# Patient Record
Sex: FEMALE | Race: WHITE | NOT HISPANIC OR LATINO | Employment: FULL TIME | URBAN - METROPOLITAN AREA
[De-identification: names, ages, dates, MRNs, and addresses within clinical notes are randomized per-mention and may not be internally consistent; named-entity substitution may affect disease eponyms.]

---

## 2017-04-12 ENCOUNTER — OFFICE VISIT (OUTPATIENT)
Dept: OBSTETRICS AND GYNECOLOGY | Facility: CLINIC | Age: 22
End: 2017-04-12
Payer: COMMERCIAL

## 2017-04-12 ENCOUNTER — LAB VISIT (OUTPATIENT)
Dept: LAB | Facility: HOSPITAL | Age: 22
End: 2017-04-12
Attending: OBSTETRICS & GYNECOLOGY
Payer: COMMERCIAL

## 2017-04-12 VITALS
HEIGHT: 62 IN | WEIGHT: 140.19 LBS | SYSTOLIC BLOOD PRESSURE: 110 MMHG | DIASTOLIC BLOOD PRESSURE: 60 MMHG | BODY MASS INDEX: 25.8 KG/M2 | RESPIRATION RATE: 15 BRPM

## 2017-04-12 DIAGNOSIS — Z32.01 POSITIVE PREGNANCY TEST: Primary | ICD-10-CM

## 2017-04-12 DIAGNOSIS — Z32.01 POSITIVE PREGNANCY TEST: ICD-10-CM

## 2017-04-12 DIAGNOSIS — O26.849 UTERINE SIZE DATE DISCREPANCY, UNSPECIFIED TRIMESTER: ICD-10-CM

## 2017-04-12 LAB
ABO + RH BLD: NORMAL
BASOPHILS # BLD AUTO: 0.01 K/UL
BASOPHILS NFR BLD: 0.1 %
BLD GP AB SCN CELLS X3 SERPL QL: NORMAL
DIFFERENTIAL METHOD: ABNORMAL
EOSINOPHIL # BLD AUTO: 0.1 K/UL
EOSINOPHIL NFR BLD: 0.6 %
ERYTHROCYTE [DISTWIDTH] IN BLOOD BY AUTOMATED COUNT: 13.3 %
HCT VFR BLD AUTO: 36.4 %
HGB BLD-MCNC: 12.7 G/DL
LYMPHOCYTES # BLD AUTO: 1.9 K/UL
LYMPHOCYTES NFR BLD: 21.5 %
MCH RBC QN AUTO: 30 PG
MCHC RBC AUTO-ENTMCNC: 34.9 %
MCV RBC AUTO: 86 FL
MONOCYTES # BLD AUTO: 0.6 K/UL
MONOCYTES NFR BLD: 6.3 %
NEUTROPHILS # BLD AUTO: 6.2 K/UL
NEUTROPHILS NFR BLD: 71.3 %
PLATELET # BLD AUTO: 195 K/UL
PMV BLD AUTO: 11 FL
RBC # BLD AUTO: 4.24 M/UL
WBC # BLD AUTO: 8.73 K/UL

## 2017-04-12 PROCEDURE — 86900 BLOOD TYPING SEROLOGIC ABO: CPT

## 2017-04-12 PROCEDURE — 87086 URINE CULTURE/COLONY COUNT: CPT

## 2017-04-12 PROCEDURE — 99214 OFFICE O/P EST MOD 30 MIN: CPT | Mod: S$GLB,,, | Performed by: OBSTETRICS & GYNECOLOGY

## 2017-04-12 PROCEDURE — 86762 RUBELLA ANTIBODY: CPT

## 2017-04-12 PROCEDURE — 87591 N.GONORRHOEAE DNA AMP PROB: CPT

## 2017-04-12 PROCEDURE — 36415 COLL VENOUS BLD VENIPUNCTURE: CPT

## 2017-04-12 PROCEDURE — 86592 SYPHILIS TEST NON-TREP QUAL: CPT

## 2017-04-12 PROCEDURE — 1160F RVW MEDS BY RX/DR IN RCRD: CPT | Mod: S$GLB,,, | Performed by: OBSTETRICS & GYNECOLOGY

## 2017-04-12 PROCEDURE — 85025 COMPLETE CBC W/AUTO DIFF WBC: CPT

## 2017-04-12 PROCEDURE — 86703 HIV-1/HIV-2 1 RESULT ANTBDY: CPT

## 2017-04-12 PROCEDURE — 88141 CYTOPATH C/V INTERPRET: CPT | Mod: ,,, | Performed by: PATHOLOGY

## 2017-04-12 PROCEDURE — 99999 PR PBB SHADOW E&M-EST. PATIENT-LVL III: CPT | Mod: PBBFAC,,, | Performed by: OBSTETRICS & GYNECOLOGY

## 2017-04-12 PROCEDURE — 87340 HEPATITIS B SURFACE AG IA: CPT

## 2017-04-12 PROCEDURE — 86901 BLOOD TYPING SEROLOGIC RH(D): CPT

## 2017-04-12 PROCEDURE — 88175 CYTOPATH C/V AUTO FLUID REDO: CPT | Performed by: PATHOLOGY

## 2017-04-12 RX ORDER — PROMETHAZINE HYDROCHLORIDE 25 MG/1
25 TABLET ORAL EVERY 4 HOURS PRN
Qty: 30 TABLET | Refills: 3 | Status: SHIPPED | OUTPATIENT
Start: 2017-04-12 | End: 2017-04-20 | Stop reason: SDUPTHER

## 2017-04-12 NOTE — MR AVS SNAPSHOT
[facility]  [address]  [place] [address]  Phone: [phone]  Fax: [phone]                  Gayla Montana   2017 2:45 PM   Office Visit    Description:  Female : 1995   Provider:  Gris Saavedra CNM   Department:  [facility]           Reason for Visit     Possible Pregnancy           Diagnoses this Visit        Comments    Positive pregnancy test    -  Primary     Uterine size date discrepancy, unspecified trimester                To Do List           Future Appointments        Provider Department Dept Phone    2017 3:30 PM LABORATORY, [address] [facility] [phone]    2017 2:00 PM ULTRASOUND, OB-GYN [facility] - OB/ -[phone]    2017 2:45 PM Gris Saavedra CNM [facility] OB/ -[phone]      Goals (5 Years of Data)     None      [facility]     [facility] Nurse Care Line -  Assistance  Unless otherwise directed by your provider, please contact [facility], our nurse care line that is available for  assistance.     Registered nurses in the [facility] provide: appointment scheduling, clinical advisement, health education, and other advisory services.  Call: [phone] (toll free)               Medications           Message regarding Medications     Verify the changes and/or additions to your medication regime listed below are the same as discussed with your clinician today.  If any of these changes or additions are incorrect, please notify your healthcare provider.             Verify that the below list of medications is an accurate representation of the medications you are currently taking.  If none reported, the list may be blank. If incorrect, please contact your healthcare provider. Carry this list with you in case of emergency.           Current Medications     PRENATABS FA 29-1 mg Tab            Clinical Reference Information           Your Vitals  "Were     BP Resp Height Weight Last Period BMI    110/60 (BP Location: Right arm, Patient Position: Sitting, BP Method: Manual) 15 5' 2" (1.575 m) 63.6 kg (140 lb 3.4 oz) 02/22/2017 25.65 kg/m2      Blood Pressure          Most Recent Value    BP  110/60      Allergies as of 4/12/2017     Latex, Natural Rubber    Olive Extract    Tomato (Solanum Lycopersicum)      Immunizations Administered on Date of Encounter - 4/12/2017     None      Orders Placed During Today's Visit      Normal Orders This Visit    Liquid-based pap smear, screening     Urine culture     Future Labs/Procedures Expected by Expires    CBC auto differential  4/12/2017 6/11/2018    Hepatitis B surface antigen  4/12/2017 6/11/2018    HIV-1 and HIV-2 antibodies  4/12/2017 6/11/2018    RPR  4/12/2017 6/11/2018    Rubella antibody, IgG  4/12/2017 6/11/2018    Type & Screen  4/12/2017 6/11/2018    US OB/GYN Procedure ([facility])  As directed 4/12/2018      [facility] Sign-Up     Activating your [facility] account is as easy as 1-2-3!     1) Visit [email or web address], select Sign Up Now, enter this activation code and your date of birth, then select Next.  Activation code not generated  Current Patient Portal Status: Account disabled      2) Create a username and password to use when you visit [facility] in the future and select a security question in case you lose your password and select Next.    3) Enter your e-mail address and click Sign Up!    Additional Information  If you have questions, please e-mail [email or web address] or call [phone] to talk to our [facility] staff. Remember, [facility] is NOT to be used for urgent needs. For medical emergencies, dial 911.         Language Assistance Services     ATTENTION: Language assistance services are available, free of charge. Please call [phone].      ATENCIÓN: Si habla español, tiene a stewart disposición servicios gratuitos de asistencia lingüística. Llame al [phone].     CHÚ Ý: N?u b?n nói Ti?ng " Vi?t, có các d?ch v? h? tr? ngôn ng? mi?n phí dành cho b?n. G?i s? [phone].         [facility] complies with applicable Federal civil rights laws and does not discriminate on the basis of race, color, national origin, age, disability, or sex.

## 2017-04-13 LAB
BACTERIA UR CULT: NORMAL
HBV SURFACE AG SERPL QL IA: NEGATIVE
HIV 1+2 AB+HIV1 P24 AG SERPL QL IA: NEGATIVE
RPR SER QL: NORMAL
RUBV IGG SER-ACNC: 42.3 IU/ML
RUBV IGG SER-IMP: REACTIVE

## 2017-04-13 NOTE — PROGRESS NOTES
"Subjective:      Gayla Montana is a 22 y.o. female who presents for evaluation of amenorrhea. She believes she could be pregnant. Pregnancy is desired. Sexual Activity: single partner, contraception: none. Current symptoms also include: nausea and positive home pregnancy test. Last period was normal. ; previous vaginal delivery here with us, familiar with practice.      Patient's last menstrual period was 2017.  The following portions of the patient's history were reviewed and updated as appropriate: allergies, current medications, past family history, past medical history, past social history, past surgical history and problem list.    Review of Systems  Pertinent items are noted in HPI.       Objective:      /60 (BP Location: Right arm, Patient Position: Sitting, BP Method: Manual)  Resp 15  Ht 5' 2" (1.575 m)  Wt 63.6 kg (140 lb 3.4 oz)  LMP 2017  BMI 25.65 kg/m2  Physical Examination: General appearance - alert, well appearing, and in no distress, oriented to person, place, and time, normal appearing weight and acyanotic, in no respiratory distress  Mental status - alert, oriented to person, place, and time, normal mood, behavior, speech, dress, motor activity, and thought processes  Neck - supple, no significant adenopathy  Chest - no tachypnea, retractions or cyanosis  Pelvic - VULVA: normal appearing vulva with no masses, tenderness or lesions, VAGINA: normal appearing vagina with normal color and discharge, no lesions, CERVIX: normal appearing cervix without discharge or lesions  Neurological - alert, oriented, normal speech, no focal findings or movement disorder noted  Musculoskeletal - full range of motion without pain  Extremities - no pedal edema noted  Skin - normal coloration and turgor, no rashes    Lab Review  Urine HCG: positive      Assessment/Plan:     1. Positive pregnancy test  CBC auto differential    Hepatitis B surface antigen    HIV-1 and HIV-2 " antibodies    RPR    Rubella antibody, IgG    Urine culture    Type & Screen    Liquid-based pap smear, screening    C. trachomatis/N. gonorrhoeae by AMP DNA Cervicovaginal   2. Uterine size date discrepancy, unspecified trimester  US OB/GYN Procedure ([facility])     PNV  Phenergan for nausea  New OB labs today  Appt with dating ultrasound in 1 week

## 2017-04-14 LAB
C TRACH DNA SPEC QL NAA+PROBE: NOT DETECTED
N GONORRHOEA DNA SPEC QL NAA+PROBE: NOT DETECTED

## 2017-04-19 ENCOUNTER — TELEPHONE (OUTPATIENT)
Dept: OBSTETRICS AND GYNECOLOGY | Facility: CLINIC | Age: 22
End: 2017-04-19

## 2017-04-19 DIAGNOSIS — R87.613 HSIL (HIGH GRADE SQUAMOUS INTRAEPITHELIAL LESION) ON PAP SMEAR OF CERVIX: Primary | ICD-10-CM

## 2017-04-19 NOTE — TELEPHONE ENCOUNTER
Attempted to contact patient, no answer. Voicemail has not been set up yet. Will attempt to call at a different time.

## 2017-04-19 NOTE — TELEPHONE ENCOUNTER
Please notify patient her pap came back with abnormal cells, high grade, colposcopy is recommended. Please schedule with MD first available.

## 2017-04-20 ENCOUNTER — INITIAL PRENATAL (OUTPATIENT)
Dept: OBSTETRICS AND GYNECOLOGY | Facility: CLINIC | Age: 22
End: 2017-04-20
Payer: COMMERCIAL

## 2017-04-20 ENCOUNTER — PROCEDURE VISIT (OUTPATIENT)
Dept: OBSTETRICS AND GYNECOLOGY | Facility: CLINIC | Age: 22
End: 2017-04-20
Payer: COMMERCIAL

## 2017-04-20 VITALS — BODY MASS INDEX: 25.24 KG/M2 | SYSTOLIC BLOOD PRESSURE: 112 MMHG | WEIGHT: 138 LBS | DIASTOLIC BLOOD PRESSURE: 60 MMHG

## 2017-04-20 DIAGNOSIS — Z34.80 SUPERVISION OF OTHER NORMAL PREGNANCY: Primary | ICD-10-CM

## 2017-04-20 DIAGNOSIS — O26.849 UTERINE SIZE DATE DISCREPANCY, UNSPECIFIED TRIMESTER: ICD-10-CM

## 2017-04-20 PROCEDURE — 99999 PR PBB SHADOW E&M-EST. PATIENT-LVL II: CPT | Mod: PBBFAC,,, | Performed by: OBSTETRICS & GYNECOLOGY

## 2017-04-20 PROCEDURE — 76801 OB US < 14 WKS SINGLE FETUS: CPT | Mod: S$GLB,,, | Performed by: OBSTETRICS & GYNECOLOGY

## 2017-04-20 PROCEDURE — 0500F INITIAL PRENATAL CARE VISIT: CPT | Mod: S$GLB,,, | Performed by: OBSTETRICS & GYNECOLOGY

## 2017-04-20 RX ORDER — PROMETHAZINE HYDROCHLORIDE 25 MG/1
25 TABLET ORAL EVERY 4 HOURS PRN
Qty: 30 TABLET | Refills: 3 | Status: SHIPPED | OUTPATIENT
Start: 2017-04-20 | End: 2017-05-18

## 2017-04-20 NOTE — MR AVS SNAPSHOT
[facility]  [address]  [place] [address]  Phone: [phone]  Fax: [phone]                  Gayla Montana   2017 2:45 PM   Initial Prenatal    Description:  Female : 1995   Provider:  Gris Saavedra CNM   Department:  [facility]           Reason for Visit     Initial Prenatal Visit                To Do List           Future Appointments        Provider Department Dept Phone    2017 10:00 AM MD [facility] - OB/ -[phone]    2017 2:30 PM LASHONDA [facility] - OB/ -[phone]      Goals (5 Years of Data)     None       These Medications        Disp Refills Start End    promethazine (PHENERGAN) 25 MG tablet 30 tablet 3 2017     Take 1 tablet (25 mg total) by mouth every 4 (four) hours as needed for Nausea. - Oral    Pharmacy: [facility] [address] [facility] #: [phone]         [facility] On Call     [facility] On Call Nurse Care Line -  Assistance  Unless otherwise directed by your provider, please contact [facility], our nurse care line that is available for  assistance.     Registered nurses in the [facility] provide: appointment scheduling, clinical advisement, health education, and other advisory services.  Call: [phone] (toll free)               Medications           Message regarding Medications     Verify the changes and/or additions to your medication regime listed below are the same as discussed with your clinician today.  If any of these changes or additions are incorrect, please notify your healthcare provider.             Verify that the below list of medications is an accurate representation of the medications you are currently taking.  If none reported, the list may be blank. If incorrect, please contact your healthcare provider. Carry this list with you in case of emergency.           Current Medications     PRENATABS FA  29-1 mg Tab     promethazine (PHENERGAN) 25 MG tablet Take 1 tablet (25 mg total) by mouth every 4 (four) hours as needed for Nausea.           Clinical Reference Information           Prenatal Vitals     Enc. Date GA Prenatal Vitals Prenatal Pulse Pain Level Urine Albumin/Glucose Edema Presentation Dilation/Effacement/Station    4/20/17 8w1d 112/60 / 62.6 kg (138 lb 0.1 oz)  / us 148 / Absent  0  None / None / None / No         TWG: -0.904 kg (-1 lb 15.9 oz)   Pregravid weight: 63.5 kg (140 lb)   Number of fetuses: 1       Your Vitals Were     BP Weight Last Period BMI       112/60 62.6 kg (138 lb 0.1 oz) 02/22/2017 25.24 kg/m2       Allergies as of 4/20/2017     Latex, Natural Rubber    Olive Extract    Tomato (Solanum Lycopersicum)      Immunizations Administered on Date of Encounter - 4/20/2017     None      [facility] Sign-Up     Activating your [facility] account is as easy as 1-2-3!     1) Visit [email or web address], select Sign Up Now, enter this activation code and your date of birth, then select Next.  Activation code not generated  Current Patient Portal Status: Account disabled      2) Create a username and password to use when you visit [facility] in the future and select a security question in case you lose your password and select Next.    3) Enter your e-mail address and click Sign Up!    Additional Information  If you have questions, please e-mail [email or web address] or call [phone] to talk to our [facility] staff. Remember, [facility] is NOT to be used for urgent needs. For medical emergencies, dial 911.         Language Assistance Services     ATTENTION: Language assistance services are available, free of charge. Please call [phone].      ATENCIÓN: Si austinla cecillecarlie, tiene a stewart disposición servicios gratuitos de asistencia lingüística. Llame al [phone].     CHÚ Ý: N?u b?n nói Ti?ng Vi?t, có các d?ch v? h? tr? ngôn ng? mi?n phí dành cho b?n. G?i s? [phone].         [facility]  complies with applicable Federal civil rights laws and does not discriminate on the basis of race, color, national origin, age, disability, or sex.

## 2017-04-21 NOTE — PROGRESS NOTES
New OB welcomed and reoriented to CNM/MD collaborative practice. Had last baby with us. Labs reviewed, WNL. Pap high grade, colpo scheduled. Nausea better with phenergan. Vaginal delivery consent signed. Taking PNV. Ultrasound done and congruent with LMP. Planning breastfeeding, aware of baby friendly and encouraged to download coffective. A-Z booklet and new OB paperwork given. No complaints today, f/u 4 weeks.

## 2017-04-28 ENCOUNTER — TELEPHONE (OUTPATIENT)
Dept: OBSTETRICS AND GYNECOLOGY | Facility: CLINIC | Age: 22
End: 2017-04-28

## 2017-04-28 NOTE — TELEPHONE ENCOUNTER
----- Message from Jean Beckman sent at 4/28/2017 10:26 AM CDT -----  Contact: Pt   Pt is requesting to reschedule her colpo. Pt states she needs to be seen sooner./ Pt can be reached at [phone] (home)

## 2017-04-28 NOTE — TELEPHONE ENCOUNTER
Patient rescheduled with Dr. Forrest on 5/5/2017 at [facility] location per patient request. Patient voiced understanding.

## 2017-05-05 ENCOUNTER — PROCEDURE VISIT (OUTPATIENT)
Dept: OBSTETRICS AND GYNECOLOGY | Facility: CLINIC | Age: 22
End: 2017-05-05
Payer: COMMERCIAL

## 2017-05-05 ENCOUNTER — TELEPHONE (OUTPATIENT)
Dept: OBSTETRICS AND GYNECOLOGY | Facility: CLINIC | Age: 22
End: 2017-05-05

## 2017-05-05 VITALS
SYSTOLIC BLOOD PRESSURE: 129 MMHG | BODY MASS INDEX: 24.11 KG/M2 | WEIGHT: 131.81 LBS | DIASTOLIC BLOOD PRESSURE: 85 MMHG

## 2017-05-05 DIAGNOSIS — R87.613 HSIL (HIGH GRADE SQUAMOUS INTRAEPITHELIAL LESION) ON PAP SMEAR OF CERVIX: Primary | ICD-10-CM

## 2017-05-05 PROCEDURE — 57455 BIOPSY OF CERVIX W/SCOPE: CPT | Mod: S$GLB,,, | Performed by: OBSTETRICS & GYNECOLOGY

## 2017-05-05 PROCEDURE — 88305 TISSUE EXAM BY PATHOLOGIST: CPT | Mod: 26,,, | Performed by: PATHOLOGY

## 2017-05-05 PROCEDURE — 88305 TISSUE EXAM BY PATHOLOGIST: CPT | Performed by: PATHOLOGY

## 2017-05-05 NOTE — TELEPHONE ENCOUNTER
----- Message from Florentin Parsons sent at 5/5/2017  8:08 AM CDT -----  Contact: Pt   States she is running about 15 min late due to car probs and is coming in for a procedure and can be reached at [phone]//thanks/dbw

## 2017-05-05 NOTE — PROCEDURES
Colposcopy-Today  Date/Time: 2017 9:46 AM  Performed by: SHYLA ALEXIS  Authorized by: SHYLA ALEXIS     Consent Done?:  Yes (Written)  Assistants?: No      Colposcopy Site:  Cervix  Position:  Supine  Acrowhite Lesion? Yes    Atypical Vessels: No    Transformation Zone Adequate?: Yes    Biopsy?: Yes         Location:  Cervix ((6 00))  ECC Performed?: No    LEEP Performed?: No    Estimated blood loss (cc):  1   Patient tolerated the procedure well with no immediate complications.   Post-operative instructions were provided for the patient.   Patient was discharged and will follow up if any complications occur     COLPOSCOPY:    Gayla Montana is a 22 y.o. female   presents for colposcopy.  Patient's last menstrual period was 2017..  Her most recent pap smear shows high-grade squamous intraepithelial neoplasia  (HGSIL-encompassing moderate and severe dysplasia).      The abnormal test findings were discussed, as well as HPV infection, need for colposcopy and possible biopsies to determine the plan of care, treatments available, the minimal risk of bleeding and infection with colposcopy, and alternatives to colposcopy.  Pt voiced understanding and desires to proceed.    Pt is pregnant 10w2d.    COLPOSCOPY EXAM:   TIME OUT PERFORMED.     No gross vulvovaginal or cervix lesions noted.  On colpo: no abnormal vasculature, acetowhite lesion(s) noted at 11 o'clock and mosaicism noted at 6 o'clock    Biopsy was taken at 6 o'clock.  ECC was not performed due to pregnancy.   SCJ was entirely visualized.    Hemostasis was adequate with application of Monsel's solution.  The speculum was removed.  The patient did tolerate the procedure well.    All collected specimens sent to pathology for histologic analysis.    Post-colposcopy counseling:  The patient was instructed to manage post-colposcopy cramping with NSAIDs or Tylenol, or with a prescription per the medication card.  Avoid intercourse,  douching, or tampons in the vagina for at least 2-3 days.  Expect a clumpy blackish discharge due to Monsel's solution application for several days.  Report heavy bleeding, worsening pain or pain that does not respond to above medications, or foul-smelling vaginal discharge. HPV vaccine recommended according to FDA age guidelines.  Importance of follow-up stressed.      Follow up based on colposcopy results.  Impression:  MITZI II-III.  If confirmed by pathology results, recommend repeat colposcopy 6 weeks PP.

## 2017-05-18 ENCOUNTER — ROUTINE PRENATAL (OUTPATIENT)
Dept: OBSTETRICS AND GYNECOLOGY | Facility: CLINIC | Age: 22
End: 2017-05-18
Payer: COMMERCIAL

## 2017-05-18 VITALS
WEIGHT: 134.69 LBS | SYSTOLIC BLOOD PRESSURE: 121 MMHG | DIASTOLIC BLOOD PRESSURE: 68 MMHG | BODY MASS INDEX: 24.64 KG/M2

## 2017-05-18 DIAGNOSIS — N87.1 CIN II (CERVICAL INTRAEPITHELIAL NEOPLASIA II): ICD-10-CM

## 2017-05-18 DIAGNOSIS — Z34.80 SUPERVISION OF OTHER NORMAL PREGNANCY: Primary | ICD-10-CM

## 2017-05-18 PROCEDURE — 99999 PR PBB SHADOW E&M-EST. PATIENT-LVL II: CPT | Mod: PBBFAC,,, | Performed by: OBSTETRICS & GYNECOLOGY

## 2017-05-18 PROCEDURE — 0502F SUBSEQUENT PRENATAL CARE: CPT | Mod: S$GLB,,, | Performed by: OBSTETRICS & GYNECOLOGY

## 2017-05-18 RX ORDER — PROMETHAZINE HYDROCHLORIDE 25 MG/1
25 SUPPOSITORY RECTAL EVERY 6 HOURS PRN
Qty: 12 SUPPOSITORY | Refills: 3 | Status: ON HOLD | OUTPATIENT
Start: 2017-05-18 | End: 2017-11-13 | Stop reason: HOSPADM

## 2017-05-18 RX ORDER — PROMETHAZINE HYDROCHLORIDE 25 MG/1
25 SUPPOSITORY RECTAL EVERY 6 HOURS PRN
Qty: 12 SUPPOSITORY | Refills: 1 | Status: SHIPPED | OUTPATIENT
Start: 2017-05-18 | End: 2017-05-18 | Stop reason: SDUPTHER

## 2017-05-18 NOTE — MR AVS SNAPSHOT
[facility]  [address]  [place] [address]  Phone: [phone]  Fax: [phone]                  Gayla Montana   2017 2:30 PM   Routine Prenatal    Description:  Female : 1995   Provider:  Gris Saavedra CNM   Department:  [facility]           Reason for Visit     Routine Prenatal Visit           Diagnoses this Visit        Comments    Supervision of other normal pregnancy    -  Primary     MITZI II (cervical intraepithelial neoplasia II)                To Do List           Future Appointments        Provider Department Dept Phone    2017 1:30 PM LABORATORY, [address] [facility] [phone]    2017 1:45 PM Gris Saavedra CNM [facility] OB/ -[phone]      Goals (5 Years of Data)     None      Follow-Up and Disposition     Return in about 4 weeks (around 6/15/2017).    Follow-up and Disposition History       These Medications        Disp Refills Start End    promethazine (PHENERGAN) 25 MG suppository 12 suppository 3 2017    Place 1 suppository (25 mg total) rectally every 6 (six) hours as needed for Nausea. - Rectal    Pharmacy: [facility] [address] #: [phone]         [facility] On Call     [facility] On Call Nurse Care Line - 24/7 Assistance  Unless otherwise directed by your provider, please contact [facility], our nurse care line that is available for 24/7 assistance.     Registered nurses in the [facility] provide: appointment scheduling, clinical advisement, health education, and other advisory services.  Call: [phone] (toll free)               Medications           Message regarding Medications     Verify the changes and/or additions to your medication regime listed below are the same as discussed with your clinician today.  If any of these changes or additions are incorrect, please notify your healthcare provider.         START taking these NEW medications        Refills    promethazine (PHENERGAN) 25 MG suppository 3    Sig: Place 1 suppository (25 mg total) rectally every 6 (six) hours as needed for Nausea.    Class: Normal    Route: Rectal      STOP taking these medications     promethazine (PHENERGAN) 25 MG tablet Take 1 tablet (25 mg total) by mouth every 4 (four) hours as needed for Nausea.           Verify that the below list of medications is an accurate representation of the medications you are currently taking.  If none reported, the list may be blank. If incorrect, please contact your healthcare provider. Carry this list with you in case of emergency.           Current Medications     PRENATABS FA 29-1 mg Tab     promethazine (PHENERGAN) 25 MG suppository Place 1 suppository (25 mg total) rectally every 6 (six) hours as needed for Nausea.           Clinical Reference Information           Prenatal Vitals     Enc. Date GA Prenatal Vitals Prenatal Pulse Pain Level Urine Albumin/Glucose Edema Presentation Dilation/Effacement/Station    5/18/17 12w1d 121/68 / 61.1 kg (134 lb 11.2 oz)  / 166 / Absent  0  None / None / None / No      5/5/17 10w2d 129/85 / 59.8 kg (131 lb 13.4 oz)           4/20/17 8w1d 112/60 / 62.6 kg (138 lb 0.1 oz)  / us 148 / Absent  0  None / None / None / No         TWG: -2.403 kg (-5 lb 4.8 oz)   Pregravid weight: 63.5 kg (140 lb)   Number of fetuses: 1       Your Vitals Were     BP Weight Last Period BMI       121/68 61.1 kg (134 lb 11.2 oz) 02/22/2017 24.64 kg/m2       Allergies as of 5/18/2017     Latex, Natural Rubber    Olive Extract    Tomato (Solanum Lycopersicum)      Immunizations Administered on Date of Encounter - 5/18/2017     None      Orders Placed During Today's Visit     Future Labs/Procedures Expected by Expires    Maternal Quad Screen  5/18/2017 7/17/2018      [facility] Sign-Up     Activating your [facility] account is as easy as 1-2-3!     1) Visit [email or web address], select Sign Up Now,  enter this activation code and your date of birth, then select Next.  Activation code not generated  Current Patient Portal Status: Account disabled      2) Create a username and password to use when you visit [facility] in the future and select a security question in case you lose your password and select Next.    3) Enter your e-mail address and click Sign Up!    Additional Information  If you have questions, please e-mail [email or web address] or call [phone] to talk to our [employer] staff. Remember, [facility] is NOT to be used for urgent needs. For medical emergencies, dial 911.         Language Assistance Services     ATTENTION: Language assistance services are available, free of charge. Please call [phone].      ATENCIÓN: Si viviane chang, tiene a stewart disposición servicios gratuitos de asistencia lingüística. Llame al [phone].     CHÚ Ý: N?u b?n nói Ti?ng Vi?t, có các d?ch v? h? tr? ngôn ng? mi?n phí dành cho b?n. G?i s? [phone].         [facility] complies with applicable Federal civil rights laws and does not discriminate on the basis of race, color, national origin, age, disability, or sex.

## 2017-05-18 NOTE — PROGRESS NOTES
Desires QUAD screen next visit  Still with nausea, phenergan [place] helps; patient with dry lips and appears very dehydrated today - states ran out of phenergan; instructed to take phenergan then push PO hydration, if unable to keep down go to ER for IV hydration  Colpo MITZI II - discussed findings with patient and current recommendations, repeat colpo PP

## 2017-05-25 ENCOUNTER — TELEPHONE (OUTPATIENT)
Dept: OBSTETRICS AND GYNECOLOGY | Facility: CLINIC | Age: 22
End: 2017-05-25

## 2017-05-25 NOTE — TELEPHONE ENCOUNTER
Called pt but no answer.  Unable to leave message due to no voicemail.  Reason for call: review pathology results (MITZI II).  These results appear to have also been reported to pt by CNM at her last visit.  Recommend repeat colpo PP.

## 2017-06-14 ENCOUNTER — LAB VISIT (OUTPATIENT)
Dept: LAB | Facility: HOSPITAL | Age: 22
End: 2017-06-14
Attending: OBSTETRICS & GYNECOLOGY
Payer: COMMERCIAL

## 2017-06-14 ENCOUNTER — ROUTINE PRENATAL (OUTPATIENT)
Dept: OBSTETRICS AND GYNECOLOGY | Facility: CLINIC | Age: 22
End: 2017-06-14
Payer: COMMERCIAL

## 2017-06-14 VITALS
BODY MASS INDEX: 24.44 KG/M2 | SYSTOLIC BLOOD PRESSURE: 116 MMHG | DIASTOLIC BLOOD PRESSURE: 74 MMHG | WEIGHT: 133.63 LBS

## 2017-06-14 DIAGNOSIS — Z36.89 ENCOUNTER FOR FETAL ANATOMIC SURVEY: ICD-10-CM

## 2017-06-14 DIAGNOSIS — Z34.80 SUPERVISION OF OTHER NORMAL PREGNANCY: Primary | ICD-10-CM

## 2017-06-14 DIAGNOSIS — Z34.80 SUPERVISION OF OTHER NORMAL PREGNANCY: ICD-10-CM

## 2017-06-14 PROCEDURE — 99999 PR PBB SHADOW E&M-EST. PATIENT-LVL III: CPT | Mod: PBBFAC,,, | Performed by: ADVANCED PRACTICE MIDWIFE

## 2017-06-14 PROCEDURE — 0502F SUBSEQUENT PRENATAL CARE: CPT | Mod: S$GLB,,, | Performed by: ADVANCED PRACTICE MIDWIFE

## 2017-06-14 PROCEDURE — 81511 FTL CGEN ABNOR FOUR ANAL: CPT

## 2017-06-14 PROCEDURE — 36415 COLL VENOUS BLD VENIPUNCTURE: CPT

## 2017-06-14 NOTE — PROGRESS NOTES
Pt is doing better, eating well, 1 # loss. Spicy foods not tolerated, but pt aware of those foods. Had US at [email or web address], baby boy. Anatomy scan next OV. QMS today. + headaches but relieved with tylenol, exp if not relieved can try benadryl. al

## 2017-06-19 LAB
# FETUSES US: NORMAL
2ND TRIMESTER 4 SCREEN PNL SERPL: NEGATIVE
2ND TRIMESTER 4 SCREEN SERPL-IMP: NORMAL
AFP MOM SERPL: 0.96
AFP SERPL-MCNC: 33.5 NG/ML
AGE AT DELIVERY: 21
B-HCG MOM SERPL: 1.71
B-HCG SERPL-ACNC: 66.8 IU/ML
FET TS 21 RISK FROM MAT AGE: NORMAL
GA (DAYS): 0 D
GA (WEEKS): 16 WK
GA METHOD: NORMAL
IDDM PATIENT QL: NORMAL
INHIBIN A MOM SERPL: 1.61
INHIBIN A SERPL-MCNC: 287.2 PG/ML
TS 18 RISK FETUS: NORMAL
TS 21 RISK FETUS: NORMAL
U ESTRIOL MOM SERPL: 0.9
U ESTRIOL SERPL-MCNC: 0.72 NG/ML

## 2017-07-12 ENCOUNTER — PROCEDURE VISIT (OUTPATIENT)
Dept: OBSTETRICS AND GYNECOLOGY | Facility: CLINIC | Age: 22
End: 2017-07-12
Payer: COMMERCIAL

## 2017-07-12 DIAGNOSIS — Z36.89 ENCOUNTER FOR FETAL ANATOMIC SURVEY: ICD-10-CM

## 2017-07-12 PROCEDURE — 76805 OB US >/= 14 WKS SNGL FETUS: CPT | Mod: S$GLB,,, | Performed by: OBSTETRICS & GYNECOLOGY

## 2017-07-21 ENCOUNTER — ROUTINE PRENATAL (OUTPATIENT)
Dept: OBSTETRICS AND GYNECOLOGY | Facility: CLINIC | Age: 22
End: 2017-07-21
Payer: COMMERCIAL

## 2017-07-21 VITALS
DIASTOLIC BLOOD PRESSURE: 60 MMHG | WEIGHT: 141.13 LBS | SYSTOLIC BLOOD PRESSURE: 118 MMHG | BODY MASS INDEX: 25.81 KG/M2

## 2017-07-21 DIAGNOSIS — Z34.92 ENCOUNTER FOR SUPERVISION OF NORMAL PREGNANCY IN SECOND TRIMESTER, UNSPECIFIED GRAVIDITY: Primary | ICD-10-CM

## 2017-07-21 PROCEDURE — 99999 PR PBB SHADOW E&M-EST. PATIENT-LVL III: CPT | Mod: PBBFAC,,, | Performed by: ADVANCED PRACTICE MIDWIFE

## 2017-07-21 PROCEDURE — 0502F SUBSEQUENT PRENATAL CARE: CPT | Mod: S$GLB,,, | Performed by: ADVANCED PRACTICE MIDWIFE

## 2017-07-21 NOTE — PROGRESS NOTES
Doing well, no complaints  Anatomy u/s suboptimal, repeat next visit  Questions answered  Plans breastfeeding    Coffective counseling sheet Get Ready discussed with mother. Reinforced avoiding induction of labor unless medically indicated as well as comfort measures during labor.  Encouraged mother to download Coffective mobile jair if she has not already done so. Mother verbalizes understanding.

## 2017-08-14 ENCOUNTER — ROUTINE PRENATAL (OUTPATIENT)
Dept: OBSTETRICS AND GYNECOLOGY | Facility: CLINIC | Age: 22
End: 2017-08-14
Payer: COMMERCIAL

## 2017-08-14 ENCOUNTER — PROCEDURE VISIT (OUTPATIENT)
Dept: OBSTETRICS AND GYNECOLOGY | Facility: CLINIC | Age: 22
End: 2017-08-14
Payer: COMMERCIAL

## 2017-08-14 VITALS
BODY MASS INDEX: 26.33 KG/M2 | WEIGHT: 143.94 LBS | SYSTOLIC BLOOD PRESSURE: 118 MMHG | DIASTOLIC BLOOD PRESSURE: 62 MMHG

## 2017-08-14 DIAGNOSIS — Z34.80 SUPERVISION OF OTHER NORMAL PREGNANCY: Primary | ICD-10-CM

## 2017-08-14 PROCEDURE — 76816 OB US FOLLOW-UP PER FETUS: CPT | Mod: S$GLB,,, | Performed by: OBSTETRICS & GYNECOLOGY

## 2017-08-14 PROCEDURE — 99999 PR PBB SHADOW E&M-EST. PATIENT-LVL II: CPT | Mod: PBBFAC,,, | Performed by: ADVANCED PRACTICE MIDWIFE

## 2017-08-14 PROCEDURE — 0502F SUBSEQUENT PRENATAL CARE: CPT | Mod: S$GLB,,, | Performed by: ADVANCED PRACTICE MIDWIFE

## 2017-08-14 RX ORDER — PROMETHAZINE HYDROCHLORIDE 12.5 MG/1
12.5 SUPPOSITORY RECTAL
COMMUNITY
End: 2017-10-03 | Stop reason: DRUGHIGH

## 2017-08-14 NOTE — PROGRESS NOTES
Doing well   PTL precautions  Anatomy Scan today complete    Coffective counseling sheet Fall In Love discussed with mother. Reinforced immediate skin to skin, the magic first hour, importance of the first feeding and delaying routine procedures. Encouraged mother to download Coffective mobile jair if she has not already done so. Mother verbalizes understanding.

## 2017-09-19 ENCOUNTER — ROUTINE PRENATAL (OUTPATIENT)
Dept: OBSTETRICS AND GYNECOLOGY | Facility: CLINIC | Age: 22
End: 2017-09-19
Payer: COMMERCIAL

## 2017-09-19 ENCOUNTER — LAB VISIT (OUTPATIENT)
Dept: LAB | Facility: HOSPITAL | Age: 22
End: 2017-09-19
Attending: ADVANCED PRACTICE MIDWIFE
Payer: MEDICAID

## 2017-09-19 VITALS
DIASTOLIC BLOOD PRESSURE: 76 MMHG | BODY MASS INDEX: 28.55 KG/M2 | SYSTOLIC BLOOD PRESSURE: 128 MMHG | WEIGHT: 156.06 LBS

## 2017-09-19 DIAGNOSIS — Z34.80 SUPERVISION OF OTHER NORMAL PREGNANCY: Primary | ICD-10-CM

## 2017-09-19 DIAGNOSIS — Z34.80 SUPERVISION OF OTHER NORMAL PREGNANCY: ICD-10-CM

## 2017-09-19 LAB
BASOPHILS # BLD AUTO: 0.01 K/UL
BASOPHILS NFR BLD: 0.1 %
DIFFERENTIAL METHOD: ABNORMAL
EOSINOPHIL # BLD AUTO: 0.1 K/UL
EOSINOPHIL NFR BLD: 0.8 %
ERYTHROCYTE [DISTWIDTH] IN BLOOD BY AUTOMATED COUNT: 12.6 %
GLUCOSE SERPL-MCNC: 123 MG/DL
HCT VFR BLD AUTO: 28.6 %
HGB BLD-MCNC: 10.2 G/DL
LYMPHOCYTES # BLD AUTO: 1.9 K/UL
LYMPHOCYTES NFR BLD: 20.6 %
MCH RBC QN AUTO: 30.9 PG
MCHC RBC AUTO-ENTMCNC: 35.7 G/DL
MCV RBC AUTO: 87 FL
MONOCYTES # BLD AUTO: 0.4 K/UL
MONOCYTES NFR BLD: 3.9 %
NEUTROPHILS # BLD AUTO: 6.7 K/UL
NEUTROPHILS NFR BLD: 74.3 %
PLATELET # BLD AUTO: 172 K/UL
PMV BLD AUTO: 11.5 FL
RBC # BLD AUTO: 3.3 M/UL
WBC # BLD AUTO: 9 K/UL

## 2017-09-19 PROCEDURE — 99999 PR PBB SHADOW E&M-EST. PATIENT-LVL III: CPT | Mod: PBBFAC,,, | Performed by: ADVANCED PRACTICE MIDWIFE

## 2017-09-19 PROCEDURE — 0502F SUBSEQUENT PRENATAL CARE: CPT | Mod: S$GLB,,, | Performed by: ADVANCED PRACTICE MIDWIFE

## 2017-09-19 PROCEDURE — 86592 SYPHILIS TEST NON-TREP QUAL: CPT

## 2017-09-19 PROCEDURE — 85025 COMPLETE CBC W/AUTO DIFF WBC: CPT

## 2017-09-19 PROCEDURE — 86703 HIV-1/HIV-2 1 RESULT ANTBDY: CPT

## 2017-09-19 PROCEDURE — 36415 COLL VENOUS BLD VENIPUNCTURE: CPT

## 2017-09-19 PROCEDURE — 82950 GLUCOSE TEST: CPT

## 2017-09-20 LAB — HIV 1+2 AB+HIV1 P24 AG SERPL QL IA: NEGATIVE

## 2017-09-21 ENCOUNTER — TELEPHONE (OUTPATIENT)
Dept: OBSTETRICS AND GYNECOLOGY | Facility: CLINIC | Age: 22
End: 2017-09-21

## 2017-09-21 ENCOUNTER — TELEPHONE (OUTPATIENT)
Dept: OBSTETRICS AND GYNECOLOGY | Facility: HOSPITAL | Age: 22
End: 2017-09-21

## 2017-09-21 DIAGNOSIS — O99.013 ANEMIA OF MOTHER IN PREGNANCY, ANTEPARTUM, THIRD TRIMESTER: ICD-10-CM

## 2017-09-21 PROBLEM — O99.019 ANEMIA OF MOTHER IN PREGNANCY, ANTEPARTUM: Status: ACTIVE | Noted: 2017-09-21

## 2017-09-21 LAB — RPR SER QL: NORMAL

## 2017-09-21 RX ORDER — FERROUS SULFATE 325(65) MG
325 TABLET ORAL DAILY
Qty: 30 TABLET | Refills: 3 | Status: SHIPPED | OUTPATIENT
Start: 2017-09-21 | End: 2017-12-18

## 2017-09-21 NOTE — TELEPHONE ENCOUNTER
Informed pt on passing her glucose but her iron was low pt stated she had some iron pills that she will take . Pt verbalized understanding .  Lai Beckamn

## 2017-09-21 NOTE — TELEPHONE ENCOUNTER
Please call pt and inform her that she passed her glucose test but she is slightly anemic and will need to take iron supplements. She can get OTC or I can send some in. They will be once a day. Thank you.

## 2017-09-25 NOTE — PROGRESS NOTES
Labs today, no c/o. Declines Tdap. Denies ptl s/s. Coffective counseling sheet Learn Your Baby discussed with mother. Instructed regarding feeding cues and methods to calm baby. Encouraged mother to download Coffective mobile jair if she has not already done so.  Mother verbalized understanding. al

## 2017-10-03 ENCOUNTER — ROUTINE PRENATAL (OUTPATIENT)
Dept: OBSTETRICS AND GYNECOLOGY | Facility: CLINIC | Age: 22
End: 2017-10-03
Payer: COMMERCIAL

## 2017-10-03 VITALS
DIASTOLIC BLOOD PRESSURE: 54 MMHG | BODY MASS INDEX: 29.19 KG/M2 | SYSTOLIC BLOOD PRESSURE: 114 MMHG | WEIGHT: 159.63 LBS

## 2017-10-03 DIAGNOSIS — Z34.03 ENCOUNTER FOR SUPERVISION OF NORMAL FIRST PREGNANCY IN THIRD TRIMESTER: Primary | ICD-10-CM

## 2017-10-03 PROCEDURE — 99999 PR PBB SHADOW E&M-EST. PATIENT-LVL III: CPT | Mod: PBBFAC,,, | Performed by: ADVANCED PRACTICE MIDWIFE

## 2017-10-03 PROCEDURE — 0502F SUBSEQUENT PRENATAL CARE: CPT | Mod: S$GLB,,, | Performed by: ADVANCED PRACTICE MIDWIFE

## 2017-10-08 NOTE — PROGRESS NOTES
Doing well   Discussed PTL precautions and [facility]  28wk labs reviewed   Pt reports that the FOB has left her and she has moved in with her parents, she is mentally stable at this time and has good support system     Coffective counseling sheet Nourish discussed with mother. Reinforced basic breastfeeding position and latch as well as proper hand expression technique. Encouraged mother to download Coffective mobile jair if she has not already done so.  Mother verbalizes understanding.

## 2017-10-19 ENCOUNTER — ROUTINE PRENATAL (OUTPATIENT)
Dept: OBSTETRICS AND GYNECOLOGY | Facility: CLINIC | Age: 22
End: 2017-10-19
Payer: COMMERCIAL

## 2017-10-19 VITALS
BODY MASS INDEX: 29.84 KG/M2 | SYSTOLIC BLOOD PRESSURE: 118 MMHG | DIASTOLIC BLOOD PRESSURE: 74 MMHG | WEIGHT: 163.13 LBS

## 2017-10-19 DIAGNOSIS — Z34.03 ENCOUNTER FOR SUPERVISION OF NORMAL FIRST PREGNANCY IN THIRD TRIMESTER: Primary | ICD-10-CM

## 2017-10-19 PROCEDURE — 0502F SUBSEQUENT PRENATAL CARE: CPT | Mod: S$GLB,,, | Performed by: ADVANCED PRACTICE MIDWIFE

## 2017-10-19 PROCEDURE — 99999 PR PBB SHADOW E&M-EST. PATIENT-LVL II: CPT | Mod: PBBFAC,,, | Performed by: ADVANCED PRACTICE MIDWIFE

## 2017-10-19 NOTE — PROGRESS NOTES
Doing well   Declines Tdap and Flu vaccine  PTL and FKCs reviewed  Discussed GBS testing to be done nv

## 2017-10-30 ENCOUNTER — ROUTINE PRENATAL (OUTPATIENT)
Dept: OBSTETRICS AND GYNECOLOGY | Facility: CLINIC | Age: 22
End: 2017-10-30
Payer: COMMERCIAL

## 2017-10-30 VITALS
SYSTOLIC BLOOD PRESSURE: 108 MMHG | WEIGHT: 166.44 LBS | BODY MASS INDEX: 30.44 KG/M2 | DIASTOLIC BLOOD PRESSURE: 66 MMHG

## 2017-10-30 DIAGNOSIS — Z34.80 ENCOUNTER FOR SUPERVISION OF NORMAL PREGNANCY IN MULTIGRAVIDA: Primary | ICD-10-CM

## 2017-10-30 PROCEDURE — 99999 PR PBB SHADOW E&M-EST. PATIENT-LVL III: CPT | Mod: PBBFAC,,, | Performed by: ADVANCED PRACTICE MIDWIFE

## 2017-10-30 PROCEDURE — 87081 CULTURE SCREEN ONLY: CPT

## 2017-10-30 PROCEDURE — 0502F SUBSEQUENT PRENATAL CARE: CPT | Mod: S$GLB,,, | Performed by: ADVANCED PRACTICE MIDWIFE

## 2017-10-31 NOTE — PROGRESS NOTES
Doing well, denies problems. Good fetal movement.   GBS collected.   Undecided on birth control, not sexually active at this time  Labor talk, fetal kick counts.

## 2017-11-02 LAB — BACTERIA SPEC AEROBE CULT: NORMAL

## 2017-11-07 ENCOUNTER — ROUTINE PRENATAL (OUTPATIENT)
Dept: OBSTETRICS AND GYNECOLOGY | Facility: CLINIC | Age: 22
End: 2017-11-07
Payer: COMMERCIAL

## 2017-11-07 VITALS
WEIGHT: 168.88 LBS | BODY MASS INDEX: 30.89 KG/M2 | DIASTOLIC BLOOD PRESSURE: 74 MMHG | SYSTOLIC BLOOD PRESSURE: 118 MMHG

## 2017-11-07 DIAGNOSIS — Z34.03 ENCOUNTER FOR SUPERVISION OF NORMAL FIRST PREGNANCY IN THIRD TRIMESTER: Primary | ICD-10-CM

## 2017-11-07 PROCEDURE — 0502F SUBSEQUENT PRENATAL CARE: CPT | Mod: S$GLB,,, | Performed by: ADVANCED PRACTICE MIDWIFE

## 2017-11-07 PROCEDURE — 99999 PR PBB SHADOW E&M-EST. PATIENT-LVL III: CPT | Mod: PBBFAC,,, | Performed by: ADVANCED PRACTICE MIDWIFE

## 2017-11-09 NOTE — PROGRESS NOTES
Doing well   Discussed circ, pt would like circ done 8 days after birth due to [employer] reasons  Labor precautions and FKCs reviewed  GBS negative, pt aware

## 2017-11-13 ENCOUNTER — HOSPITAL ENCOUNTER (OUTPATIENT)
Facility: HOSPITAL | Age: 22
Discharge: HOME | End: 2017-11-13
Attending: OBSTETRICS & GYNECOLOGY | Admitting: OBSTETRICS & GYNECOLOGY
Payer: COMMERCIAL

## 2017-11-13 ENCOUNTER — ROUTINE PRENATAL (OUTPATIENT)
Dept: OBSTETRICS AND GYNECOLOGY | Facility: CLINIC | Age: 22
End: 2017-11-13
Payer: COMMERCIAL

## 2017-11-13 VITALS
SYSTOLIC BLOOD PRESSURE: 135 MMHG | RESPIRATION RATE: 18 BRPM | DIASTOLIC BLOOD PRESSURE: 88 MMHG | TEMPERATURE: 98 F | HEIGHT: 62 IN | HEART RATE: 100 BPM | BODY MASS INDEX: 30.83 KG/M2 | WEIGHT: 167.56 LBS

## 2017-11-13 VITALS
SYSTOLIC BLOOD PRESSURE: 122 MMHG | BODY MASS INDEX: 30.65 KG/M2 | WEIGHT: 167.56 LBS | DIASTOLIC BLOOD PRESSURE: 78 MMHG

## 2017-11-13 DIAGNOSIS — Z34.03 ENCOUNTER FOR SUPERVISION OF NORMAL FIRST PREGNANCY IN THIRD TRIMESTER: Primary | ICD-10-CM

## 2017-11-13 DIAGNOSIS — N89.8 CLEAR VAGINAL DISCHARGE: ICD-10-CM

## 2017-11-13 PROCEDURE — G0378 HOSPITAL OBSERVATION PER HR: HCPCS

## 2017-11-13 PROCEDURE — 0502F SUBSEQUENT PRENATAL CARE: CPT | Mod: S$GLB,,, | Performed by: ADVANCED PRACTICE MIDWIFE

## 2017-11-13 PROCEDURE — 59025 FETAL NON-STRESS TEST: CPT

## 2017-11-13 PROCEDURE — 59025 FETAL NON-STRESS TEST: CPT | Mod: 26,,, | Performed by: ADVANCED PRACTICE MIDWIFE

## 2017-11-13 PROCEDURE — 99211 OFF/OP EST MAY X REQ PHY/QHP: CPT | Mod: 25

## 2017-11-13 PROCEDURE — 99213 OFFICE O/P EST LOW 20 MIN: CPT | Mod: TH,25,, | Performed by: ADVANCED PRACTICE MIDWIFE

## 2017-11-13 PROCEDURE — 99999 PR PBB SHADOW E&M-EST. PATIENT-LVL II: CPT | Mod: PBBFAC,,, | Performed by: ADVANCED PRACTICE MIDWIFE

## 2017-11-13 RX ORDER — ACETAMINOPHEN 500 MG
500 TABLET ORAL EVERY 6 HOURS PRN
Status: DISCONTINUED | OUTPATIENT
Start: 2017-11-13 | End: 2017-11-13 | Stop reason: HOSPADM

## 2017-11-13 RX ORDER — ONDANSETRON 8 MG/1
8 TABLET, ORALLY DISINTEGRATING ORAL EVERY 8 HOURS PRN
Status: DISCONTINUED | OUTPATIENT
Start: 2017-11-13 | End: 2017-11-13 | Stop reason: HOSPADM

## 2017-11-13 NOTE — H&P
[facility] -   Obstetrics  History & Physical    Patient Name: Gayla Montana  MRN: [id number]  Admission Date: 2017  Primary Care Provider: Primary Doctor No    Subjective:     Principal Problem:<principal problem not specified>    History of Present Illness:   IUP at 37w5d  Reports clear vaginal discharge    Obstetric HPI:  Patient reports Frequency: Every 2-5 minutes contractions, active fetal movement, Yes vaginal bleeding , Yes loss of fluid     This pregnancy has been complicated by HGSIL on pap, anemia,    Obstetric History       T1      L1     SAB0   TAB0   Ectopic0   Multiple0   Live Births1       # Outcome Date GA Lbr Dennis/2nd Weight Sex Delivery Anes PTL Lv   2 Current            1 Term 11/30/15 39w2d / 00:26 3.15 kg (6 lb 15.1 oz) F Vag-Spont EPI N TOMASA      Apgar1:  9                Apgar5: 9        Past Medical History:   Diagnosis Date    Bipolar 1 disorder     Headache(784.0)     Hemorrhoids     Inflammatory bowel disease      Past Surgical History:   Procedure Laterality Date    ADENOIDECTOMY      APPENDECTOMY      FOOT SURGERY      TONSILLECTOMY         PTA Medications   Medication Sig    ferrous sulfate 325 mg (65 mg iron) Tab tablet Take 1 tablet (325 mg total) by mouth once daily.    PRENATABS FA 29-1 mg Tab     promethazine (PHENERGAN) 25 MG suppository Place 1 suppository (25 mg total) rectally every 6 (six) hours as needed for Nausea.       Review of patient's allergies indicates:   Allergen Reactions    Latex, natural rubber Swelling and Rash        Family History     Problem Relation (Age of Onset)    Diabetes Mother, Father    Multiple sclerosis Maternal Aunt    No Known Problems Brother, Sister        Social History Main Topics    Smoking status: Never Smoker    Smokeless tobacco: Never Used    Alcohol use No    Drug use: No    Sexual activity: Yes     Birth control/ protection: None     Review of Systems   Constitutional:  Negative.    HENT: Negative.    Eyes: Negative.    Respiratory: Negative.    Cardiovascular: Negative.    Gastrointestinal: Negative.    Endocrine: Negative.    Genitourinary: Negative.    Musculoskeletal: Negative.    Skin:  Negative.   Neurological: Negative.    Hematological: Negative.    Psychiatric/Behavioral: Negative.    Breast: negative.    All other systems reviewed and are negative.     Objective:     Vital Signs (Most Recent):    Vital Signs (24h Range):  BP: (122)/(78) 122/78        There is no height or weight on file to calculate BMI.    FHT: 144Cat 1 (reassuring)  TOCO:  Q 2-5 minutes    Physical Exam:   Constitutional: She is oriented to person, place, and time. She appears well-developed and well-nourished.     Eyes: Conjunctivae are normal. Pupils are equal, round, and reactive to light.    Neck: Normal range of motion.    Cardiovascular: Normal rate and regular rhythm.     Pulmonary/Chest: Breath sounds normal.        Abdominal: Soft.     Genitourinary: Vagina normal and uterus normal.           Musculoskeletal: Normal range of motion and moves all extremeties.       Neurological: She is alert and oriented to person, place, and time.    Skin: Skin is warm.    Psychiatric: She has a normal mood and affect. Her behavior is normal. Thought content normal.       Cervix:  Dilation:  2  Effacement:  75%  Station: -2  Presentation: Vertex     Significant Labs:  Lab Results   Component Value Date    GROUPTRH A POS 2017    HEPBSAG Negative 2017    STREPBCULT No Group B Streptococcus isolated 10/30/2017       I have personallly reviewed all pertinent lab results from the last 24 hours.    Assessment/Plan:     22 y.o. female  at 37w5d for:    Clear vaginal discharge    A clear vaginal discharge  Sterile speculum exam  Negative, pool, fern, nitrazine  Reactive NST  P will discharge home  Kick counts            Rosuara Menard CNM  Obstetrics  [facility] -

## 2017-11-13 NOTE — PROGRESS NOTES
Doing well, occasional contractions, nothing consistent   VE done per pt request   Labor precautions and FKCs reviewed

## 2017-11-13 NOTE — ASSESSMENT & PLAN NOTE
A clear vaginal discharge  Sterile speculum exam  Negative, pool, fern, nitrazine  Reactive NST  P will discharge home  Kick counts

## 2017-11-13 NOTE — PROCEDURES
Gayla Montana is a 22 y.o. female patient.            Obtain Fetal nonstress test (NST)  Date/Time: 11/13/2017 4:14 PM  Performed by: ROSAURA MENARD  Authorized by: ROSAURA MENARD     Nonstress Test:     Variability:  6-25 BPM    Decelerations:  None    Accelerations:  15 bpm    Acoustic Stimulator: No      Uterine Irritability: Yes      Contractions:  Regular    Contraction Frequency:  2-5  Biophysical Profile:     Nonstress Test Interpretation: reactive      Overall Impression:  Reassuring  Post-procedure:     Patient tolerance:  Patient tolerated the procedure well with no immediate complications        Rosaura Menard  11/13/2017

## 2017-11-13 NOTE — DISCHARGE SUMMARY
[facility]  Obstetrics  Discharge Summary      Patient Name: Gayla Montana  MRN: [id number]  Admission Date: 2017  Hospital Length of Stay: 0 days  Discharge Date and Time:  2017 4:16 PM  Attending Physician: Hali Riddle MD   Discharging Provider: Rosaura Menard CNM  Primary Care Provider: Primary Doctor No    HPI:  IUP at 37w5d  Reports clear vaginal discharge    * No surgery found *     Hospital Course:   Sterile speculum exam negative fern, nitrazine and pool  Reactive NST        Final Active Diagnoses:    Diagnosis Date Noted POA    Clear vaginal discharge [N89.8] 2017 Yes      Problems Resolved During this Admission:    Diagnosis Date Noted Date Resolved POA            Feeding Method: breast    Immunizations     None          This patient has no babies on file.  Pending Diagnostic Studies:     None          Discharged Condition: good    Disposition:     Follow Up:    Patient Instructions:   No discharge procedures on file.  Medications:  Current Discharge Medication List      CONTINUE these medications which have NOT CHANGED    Details   ferrous sulfate 325 mg (65 mg iron) Tab tablet Take 1 tablet (325 mg total) by mouth once daily.  Qty: 30 tablet, Refills: 3      PRENATABS FA 29-1 mg Tab       promethazine (PHENERGAN) 25 MG suppository Place 1 suppository (25 mg total) rectally every 6 (six) hours as needed for Nausea.  Qty: 12 suppository, Refills: 3             Rosaura Menard CNM  Obstetrics  [facility]

## 2017-11-13 NOTE — SUBJECTIVE & OBJECTIVE
Obstetric HPI:  Patient reports Frequency: Every 2-5 minutes contractions, active fetal movement, Yes vaginal bleeding , Yes loss of fluid     This pregnancy has been complicated by HGSIL on pap, anemia,    Obstetric History       T1      L1     SAB0   TAB0   Ectopic0   Multiple0   Live Births1       # Outcome Date GA Lbr Dennis/2nd Weight Sex Delivery Anes PTL Lv   2 Current            1 Term 11/30/15 39w2d / 00:26 3.15 kg (6 lb 15.1 oz) F Vag-Spont EPI N TOMASA      Apgar1:  9                Apgar5: 9        Past Medical History:   Diagnosis Date    Bipolar 1 disorder     Headache(784.0)     Hemorrhoids     Inflammatory bowel disease      Past Surgical History:   Procedure Laterality Date    ADENOIDECTOMY      APPENDECTOMY      FOOT SURGERY      TONSILLECTOMY         PTA Medications   Medication Sig    ferrous sulfate 325 mg (65 mg iron) Tab tablet Take 1 tablet (325 mg total) by mouth once daily.    PRENATABS FA 29-1 mg Tab     promethazine (PHENERGAN) 25 MG suppository Place 1 suppository (25 mg total) rectally every 6 (six) hours as needed for Nausea.       Review of patient's allergies indicates:   Allergen Reactions    Latex, natural rubber Swelling and Rash        Family History     Problem Relation (Age of Onset)    Diabetes Mother, Father    Multiple sclerosis Maternal Aunt    No Known Problems Brother, Sister        Social History Main Topics    Smoking status: Never Smoker    Smokeless tobacco: Never Used    Alcohol use No    Drug use: No    Sexual activity: Yes     Birth control/ protection: None     Review of Systems   Constitutional: Negative.    HENT: Negative.    Eyes: Negative.    Respiratory: Negative.    Cardiovascular: Negative.    Gastrointestinal: Negative.    Endocrine: Negative.    Genitourinary: Negative.    Musculoskeletal: Negative.    Skin:  Negative.   Neurological: Negative.    Hematological: Negative.    Psychiatric/Behavioral: Negative.    Breast: negative.     All other systems reviewed and are negative.     Objective:     Vital Signs (Most Recent):    Vital Signs (24h Range):  BP: (122)/(78) 122/78        There is no height or weight on file to calculate BMI.    FHT: 144Cat 1 (reassuring)  TOCO:  Q 2-5 minutes    Physical Exam:   Constitutional: She is oriented to person, place, and time. She appears well-developed and well-nourished.     Eyes: Conjunctivae are normal. Pupils are equal, round, and reactive to light.    Neck: Normal range of motion.    Cardiovascular: Normal rate and regular rhythm.     Pulmonary/Chest: Breath sounds normal.        Abdominal: Soft.     Genitourinary: Vagina normal and uterus normal.           Musculoskeletal: Normal range of motion and moves all extremeties.       Neurological: She is alert and oriented to person, place, and time.    Skin: Skin is warm.    Psychiatric: She has a normal mood and affect. Her behavior is normal. Thought content normal.       Cervix:  Dilation:  2  Effacement:  75%  Station: -2  Presentation: Vertex     Significant Labs:  Lab Results   Component Value Date    GROUPTRH A POS 04/12/2017    HEPBSAG Negative 04/12/2017    STREPBCULT No Group B Streptococcus isolated 10/30/2017       I have personallly reviewed all pertinent lab results from the last 24 hours.

## 2017-11-15 ENCOUNTER — ANESTHESIA EVENT (OUTPATIENT)
Dept: OBSTETRICS AND GYNECOLOGY | Facility: HOSPITAL | Age: 22
End: 2017-11-15
Payer: COMMERCIAL

## 2017-11-15 ENCOUNTER — HOSPITAL ENCOUNTER (INPATIENT)
Facility: HOSPITAL | Age: 22
LOS: 2 days | Discharge: HOME | End: 2017-11-17
Attending: OBSTETRICS & GYNECOLOGY | Admitting: OBSTETRICS & GYNECOLOGY
Payer: COMMERCIAL

## 2017-11-15 ENCOUNTER — ANESTHESIA (OUTPATIENT)
Dept: OBSTETRICS AND GYNECOLOGY | Facility: HOSPITAL | Age: 22
End: 2017-11-15
Payer: COMMERCIAL

## 2017-11-15 DIAGNOSIS — O47.9 THREATENED LABOR: ICD-10-CM

## 2017-11-15 DIAGNOSIS — Z37.9 NORMAL LABOR: ICD-10-CM

## 2017-11-15 LAB
ABO + RH BLD: NORMAL
BASOPHILS # BLD AUTO: 0.01 K/UL
BASOPHILS NFR BLD: 0.1 %
BLD GP AB SCN CELLS X3 SERPL QL: NORMAL
DIFFERENTIAL METHOD: ABNORMAL
EOSINOPHIL # BLD AUTO: 0.1 K/UL
EOSINOPHIL NFR BLD: 0.4 %
ERYTHROCYTE [DISTWIDTH] IN BLOOD BY AUTOMATED COUNT: 14 %
HCT VFR BLD AUTO: 34.8 %
HGB BLD-MCNC: 11.5 G/DL
LYMPHOCYTES # BLD AUTO: 1.6 K/UL
LYMPHOCYTES NFR BLD: 11.7 %
MCH RBC QN AUTO: 28 PG
MCHC RBC AUTO-ENTMCNC: 33 G/DL
MCV RBC AUTO: 85 FL
MONOCYTES # BLD AUTO: 0.8 K/UL
MONOCYTES NFR BLD: 6 %
NEUTROPHILS # BLD AUTO: 11.1 K/UL
NEUTROPHILS NFR BLD: 81.8 %
PLATELET # BLD AUTO: 161 K/UL
PMV BLD AUTO: 11.6 FL
RBC # BLD AUTO: 4.1 M/UL
WBC # BLD AUTO: 13.54 K/UL

## 2017-11-15 PROCEDURE — 72200004 HC VAGINAL DELIVERY LEVEL I

## 2017-11-15 PROCEDURE — 62326 NJX INTERLAMINAR LMBR/SAC: CPT | Performed by: NURSE ANESTHETIST, CERTIFIED REGISTERED

## 2017-11-15 PROCEDURE — 51701 INSERT BLADDER CATHETER: CPT

## 2017-11-15 PROCEDURE — 25000003 PHARM REV CODE 250: Performed by: ADVANCED PRACTICE MIDWIFE

## 2017-11-15 PROCEDURE — 63600175 PHARM REV CODE 636 W HCPCS: Performed by: ADVANCED PRACTICE MIDWIFE

## 2017-11-15 PROCEDURE — 25000003 PHARM REV CODE 250: Performed by: NURSE ANESTHETIST, CERTIFIED REGISTERED

## 2017-11-15 PROCEDURE — 72100003 HC LABOR CARE, EA. ADDL. 8 HRS

## 2017-11-15 PROCEDURE — 59400 OBSTETRICAL CARE: CPT | Mod: AT,,, | Performed by: OBSTETRICS & GYNECOLOGY

## 2017-11-15 PROCEDURE — 85025 COMPLETE CBC W/AUTO DIFF WBC: CPT

## 2017-11-15 PROCEDURE — 51702 INSERT TEMP BLADDER CATH: CPT

## 2017-11-15 PROCEDURE — 63600175 PHARM REV CODE 636 W HCPCS: Performed by: NURSE ANESTHETIST, CERTIFIED REGISTERED

## 2017-11-15 PROCEDURE — 27800517 HC TRAY,EPIDURAL-CONTINUOUS: Performed by: NURSE ANESTHETIST, CERTIFIED REGISTERED

## 2017-11-15 PROCEDURE — 11000001 HC ACUTE MED/SURG PRIVATE ROOM

## 2017-11-15 PROCEDURE — 86901 BLOOD TYPING SEROLOGIC RH(D): CPT

## 2017-11-15 PROCEDURE — 10907ZC DRAINAGE OF AMNIOTIC FLUID, THERAPEUTIC FROM PRODUCTS OF CONCEPTION, VIA NATURAL OR ARTIFICIAL OPENING: ICD-10-PCS | Performed by: OBSTETRICS & GYNECOLOGY

## 2017-11-15 PROCEDURE — 72100002 HC LABOR CARE, 1ST 8 HOURS

## 2017-11-15 PROCEDURE — 86900 BLOOD TYPING SEROLOGIC ABO: CPT

## 2017-11-15 PROCEDURE — 36000685 HC CESAREAN SECTION LEVEL I

## 2017-11-15 RX ORDER — ROPIVACAINE IN 0.9% SOD CHL/PF 0.2 %
PLASTIC BAG, INJECTION (ML) EPIDURAL CONTINUOUS
Status: DISCONTINUED | OUTPATIENT
Start: 2017-11-15 | End: 2017-11-16

## 2017-11-15 RX ORDER — ROPIVACAINE HYDROCHLORIDE 2 MG/ML
INJECTION, SOLUTION EPIDURAL; INFILTRATION; PERINEURAL CONTINUOUS PRN
Status: DISCONTINUED | OUTPATIENT
Start: 2017-11-15 | End: 2017-11-16

## 2017-11-15 RX ORDER — ONDANSETRON 8 MG/1
8 TABLET, ORALLY DISINTEGRATING ORAL EVERY 8 HOURS PRN
Status: DISCONTINUED | OUTPATIENT
Start: 2017-11-15 | End: 2017-11-16

## 2017-11-15 RX ORDER — ROPIVACAINE HYDROCHLORIDE 2 MG/ML
INJECTION, SOLUTION EPIDURAL; INFILTRATION; PERINEURAL
Status: DISCONTINUED | OUTPATIENT
Start: 2017-11-15 | End: 2017-11-16

## 2017-11-15 RX ORDER — LIDOCAINE HYDROCHLORIDE AND EPINEPHRINE 15; 5 MG/ML; UG/ML
INJECTION, SOLUTION EPIDURAL
Status: DISCONTINUED | OUTPATIENT
Start: 2017-11-15 | End: 2017-11-16

## 2017-11-15 RX ORDER — FAMOTIDINE 10 MG/ML
20 INJECTION INTRAVENOUS ONCE
Status: DISCONTINUED | OUTPATIENT
Start: 2017-11-15 | End: 2017-11-16

## 2017-11-15 RX ORDER — ACETAMINOPHEN 500 MG
500 TABLET ORAL EVERY 6 HOURS PRN
Status: DISCONTINUED | OUTPATIENT
Start: 2017-11-15 | End: 2017-11-16

## 2017-11-15 RX ORDER — BUTORPHANOL TARTRATE 1 MG/ML
2 INJECTION INTRAMUSCULAR; INTRAVENOUS ONCE
Status: COMPLETED | OUTPATIENT
Start: 2017-11-15 | End: 2017-11-15

## 2017-11-15 RX ORDER — SODIUM CHLORIDE, SODIUM LACTATE, POTASSIUM CHLORIDE, CALCIUM CHLORIDE 600; 310; 30; 20 MG/100ML; MG/100ML; MG/100ML; MG/100ML
INJECTION, SOLUTION INTRAVENOUS CONTINUOUS
Status: DISCONTINUED | OUTPATIENT
Start: 2017-11-15 | End: 2017-11-16

## 2017-11-15 RX ORDER — SODIUM CITRATE AND CITRIC ACID MONOHYDRATE 334; 500 MG/5ML; MG/5ML
30 SOLUTION ORAL ONCE
Status: DISCONTINUED | OUTPATIENT
Start: 2017-11-15 | End: 2017-11-16

## 2017-11-15 RX ADMIN — SODIUM CHLORIDE, SODIUM LACTATE, POTASSIUM CHLORIDE, AND CALCIUM CHLORIDE: .6; .31; .03; .02 INJECTION, SOLUTION INTRAVENOUS at 03:11

## 2017-11-15 RX ADMIN — SODIUM CHLORIDE, SODIUM LACTATE, POTASSIUM CHLORIDE, AND CALCIUM CHLORIDE 1000 ML: .6; .31; .03; .02 INJECTION, SOLUTION INTRAVENOUS at 05:11

## 2017-11-15 RX ADMIN — ROPIVACAINE HYDROCHLORIDE: 2 INJECTION, SOLUTION EPIDURAL; INFILTRATION at 09:11

## 2017-11-15 RX ADMIN — BUTORPHANOL TARTRATE 2 MG: 1 INJECTION, SOLUTION INTRAMUSCULAR; INTRAVENOUS at 02:11

## 2017-11-15 RX ADMIN — Medication 333 MILLI-UNITS/MIN: at 11:11

## 2017-11-15 RX ADMIN — SODIUM CHLORIDE, SODIUM LACTATE, POTASSIUM CHLORIDE, AND CALCIUM CHLORIDE: .6; .31; .03; .02 INJECTION, SOLUTION INTRAVENOUS at 06:11

## 2017-11-15 RX ADMIN — ROPIVACAINE HYDROCHLORIDE 12 ML/HR: 2 INJECTION, SOLUTION EPIDURAL; INFILTRATION at 06:11

## 2017-11-15 RX ADMIN — SODIUM CHLORIDE, SODIUM LACTATE, POTASSIUM CHLORIDE, AND CALCIUM CHLORIDE 1000 ML: .6; .31; .03; .02 INJECTION, SOLUTION INTRAVENOUS at 02:11

## 2017-11-15 RX ADMIN — ONDANSETRON 8 MG: 8 TABLET, ORALLY DISINTEGRATING ORAL at 07:11

## 2017-11-15 RX ADMIN — ROPIVACAINE HYDROCHLORIDE 14 ML: 2 INJECTION, SOLUTION EPIDURAL; INFILTRATION at 06:11

## 2017-11-15 RX ADMIN — LIDOCAINE HYDROCHLORIDE,EPINEPHRINE BITARTRATE 5 ML: 15; .005 INJECTION, SOLUTION EPIDURAL; INFILTRATION; INTRACAUDAL; PERINEURAL at 06:11

## 2017-11-15 NOTE — ANESTHESIA PREPROCEDURE EVALUATION
11/15/2017  Gayla Montana is a 22 y.o., female.    Anesthesia Evaluation    I have reviewed the Patient Summary Reports.    I have reviewed the Nursing Notes.   I have reviewed the Medications.     Review of Systems  Anesthesia Hx:  No previous Anesthesia  Denies Family Hx of Anesthesia complications.   Denies Personal Hx of Anesthesia complications.   Social:  Non-Smoker    Hematology/Oncology:  Hematology Normal   Oncology Normal     EENT/Dental:EENT/Dental Normal   Cardiovascular:  Cardiovascular Normal Exercise tolerance: good     Pulmonary:  Pulmonary Normal    Renal/:  Renal/ Normal     Hepatic/GI:  Hepatic/GI Normal    Musculoskeletal:  Musculoskeletal Normal    Neurological:  Neurology Normal    Endocrine:  Endocrine Normal    Dermatological:  Skin Normal        Physical Exam  General:  Well nourished    Airway/Jaw/Neck:  Airway Findings: Mouth Opening: Normal Tongue: Normal  Mallampati: II  Improves to II with phonation.  TM Distance: Normal, at least 6 cm       Chest/Lungs:  Chest/Lungs Findings: Normal Respiratory Rate     Heart/Vascular:  Heart Findings: Rhythm: Regular Rhythm  Heart murmur: negative Vascular Findings: Normal    Abdomen:  Abdomen Findings: Normal    Musculoskeletal:  Musculoskeletal Findings: Normal   Skin:  Skin Findings: Normal    Mental Status:  Mental Status Findings:  Cooperative, Alert and Oriented         Anesthesia Plan  Type of Anesthesia, risks & benefits discussed:  Anesthesia Type:  epidural  Patient's Preference:   Intra-op Monitoring Plan:   Intra-op Monitoring Plan Comments:   Post Op Pain Control Plan:   Post Op Pain Control Plan Comments:   Induction:    Beta Blocker:  Patient is not currently on a Beta-Blocker (No further documentation required).       Informed Consent: Patient understands risks and agrees with Anesthesia plan.  Questions answered.  Anesthesia consent signed with patient.  ASA Score: 2     Day of Surgery Review of History & Physical: I have interviewed and examined the patient. I have reviewed the patient's H&P dated:  There are no significant changes.          Ready For Surgery From Anesthesia Perspective.

## 2017-11-15 NOTE — SUBJECTIVE & OBJECTIVE
Obstetric HPI:  Patient reports Date/time of onset: 11/15/17 @ 0300, Frequency: Every 5 minutes, Duration: 50-70 seconds and Intensity: moderate contractions, active fetal movement, No vaginal bleeding , No loss of fluid     This pregnancy has been complicated by IBS, HSIL    Obstetric History       T1      L1     SAB0   TAB0   Ectopic0   Multiple0   Live Births1       # Outcome Date GA Lbr Dennis/2nd Weight Sex Delivery Anes PTL Lv   2 Current            1 Term 11/30/15 39w2d / 00:26 3.15 kg (6 lb 15.1 oz) F Vag-Spont EPI N TOMASA      Apgar1:  9                Apgar5: 9        Past Medical History:   Diagnosis Date    Bipolar 1 disorder     Headache(784.0)     Hemorrhoids     Inflammatory bowel disease      Past Surgical History:   Procedure Laterality Date    ADENOIDECTOMY      APPENDECTOMY      FOOT SURGERY      TONSILLECTOMY         PTA Medications   Medication Sig    ferrous sulfate 325 mg (65 mg iron) Tab tablet Take 1 tablet (325 mg total) by mouth once daily.       Review of patient's allergies indicates:   Allergen Reactions    Latex, natural rubber Swelling and Rash        Family History     Problem Relation (Age of Onset)    Diabetes Mother, Father    Multiple sclerosis Maternal Aunt    No Known Problems Brother, Sister        Social History Main Topics    Smoking status: Never Smoker    Smokeless tobacco: Never Used    Alcohol use No    Drug use: No    Sexual activity: Yes     Birth control/ protection: None     Review of Systems   Constitutional: Negative.    Respiratory: Negative.    Cardiovascular: Negative.    Gastrointestinal: Positive for abdominal pain.   Genitourinary: Negative.    Musculoskeletal: Positive for back pain.   Skin:  Negative.   Neurological: Negative.    Psychiatric/Behavioral: Negative.       Objective:     Vital Signs (Most Recent):  Pulse: 97 (11/15/17 1431)  BP: 124/79 (11/15/17 1431) Vital Signs (24h Range):  Pulse:  [] 97  BP: (124-155)/(79-96)  124/79        There is no height or weight on file to calculate BMI.    FHT: 135 Cat 1 (reassuring)  TOCO: Q 3-5 minutes    Physical Exam:   Constitutional: She is oriented to person, place, and time. She appears well-developed and well-nourished.       Cardiovascular: Normal rate and regular rhythm.     Pulmonary/Chest: Effort normal and breath sounds normal.        Abdominal: Soft. Bowel sounds are normal.   gravid     Genitourinary: Vagina normal.           Musculoskeletal: Normal range of motion and moves all extremeties.       Neurological: She is alert and oriented to person, place, and time.    Skin: Skin is warm and dry.    Psychiatric: She has a normal mood and affect. Her behavior is normal.       Cervix:  Dilation:  4  Effacement:  75  Station: -2  Presentation: Vertex     Significant Labs:  Lab Results   Component Value Date    GROUPTRH A POS 11/15/2017    HEPBSAG Negative 04/12/2017    STREPBCULT No Group B Streptococcus isolated 10/30/2017       I have personallly reviewed all pertinent lab results from the last 24 hours.

## 2017-11-15 NOTE — SUBJECTIVE & OBJECTIVE
Interval History:  Gayla is a 22 y.o.  at 38w0d. She is doing ok but having pain again and wants to be examined.    Objective:     Vital Signs (Most Recent):  Pulse: 97 (11/15/17 1431)  BP: 124/79 (11/15/17 1431) Vital Signs (24h Range):  Pulse:  [] 97  BP: (124-155)/(79-96) 124/79        There is no height or weight on file to calculate BMI.    FHT: 130 Cat 1 (reassuring)  TOCO:  Q 2-3 minutes    Cervical Exam:  Dilation:  5  Effacement:  90  Station: -2  Presentation: Vertex     Significant Labs:  Lab Results   Component Value Date    GROUPTRH A POS 11/15/2017    HEPBSAG Negative 2017    STREPBCULT No Group B Streptococcus isolated 10/30/2017       I have personallly reviewed all pertinent lab results from the last 24 hours.    Physical Exam:   Constitutional: She appears well-developed and well-nourished.

## 2017-11-15 NOTE — H&P
[facility] -   Obstetrics  History & Physical    Patient Name: Gayla Montana  MRN: [id number]  Admission Date: 11/15/2017  Primary Care Provider: Primary Doctor No    Subjective:     Principal Problem:Threatened labor    History of Present Illness:  Contractions since 0300    Obstetric HPI:  Patient reports Date/time of onset: 11/15/17 @ 0300, Frequency: Every 5 minutes, Duration: 50-70 seconds and Intensity: moderate contractions, active fetal movement, No vaginal bleeding , No loss of fluid     This pregnancy has been complicated by IBS, HSIL    Obstetric History       T1      L1     SAB0   TAB0   Ectopic0   Multiple0   Live Births1       # Outcome Date GA Lbr Dennis/2nd Weight Sex Delivery Anes PTL Lv   2 Current            1 Term 11/30/15 39w2d / 00:26 3.15 kg (6 lb 15.1 oz) F Vag-Spont EPI N TOMASA      Apgar1:  9                Apgar5: 9        Past Medical History:   Diagnosis Date    Bipolar 1 disorder     Headache(784.0)     Hemorrhoids     Inflammatory bowel disease      Past Surgical History:   Procedure Laterality Date    ADENOIDECTOMY      APPENDECTOMY      FOOT SURGERY      TONSILLECTOMY         PTA Medications   Medication Sig    ferrous sulfate 325 mg (65 mg iron) Tab tablet Take 1 tablet (325 mg total) by mouth once daily.       Review of patient's allergies indicates:   Allergen Reactions    Latex, natural rubber Swelling and Rash        Family History     Problem Relation (Age of Onset)    Diabetes Mother, Father    Multiple sclerosis Maternal Aunt    No Known Problems Brother, Sister        Social History Main Topics    Smoking status: Never Smoker    Smokeless tobacco: Never Used    Alcohol use No    Drug use: No    Sexual activity: Yes     Birth control/ protection: None     Review of Systems   Constitutional: Negative.    Respiratory: Negative.    Cardiovascular: Negative.    Gastrointestinal: Positive for abdominal pain.   Genitourinary: Negative.     Musculoskeletal: Positive for back pain.   Skin:  Negative.   Neurological: Negative.    Psychiatric/Behavioral: Negative.       Objective:     Vital Signs (Most Recent):  Pulse: 97 (11/15/17 1431)  BP: 124/79 (11/15/17 1431) Vital Signs (24h Range):  Pulse:  [] 97  BP: (124-155)/(79-96) 124/79        There is no height or weight on file to calculate BMI.    FHT: 135 Cat 1 (reassuring)  TOCO: Q 3-5 minutes    Physical Exam:   Constitutional: She is oriented to person, place, and time. She appears well-developed and well-nourished.       Cardiovascular: Normal rate and regular rhythm.     Pulmonary/Chest: Effort normal and breath sounds normal.        Abdominal: Soft. Bowel sounds are normal.   gravid     Genitourinary: Vagina normal.           Musculoskeletal: Normal range of motion and moves all extremeties.       Neurological: She is alert and oriented to person, place, and time.    Skin: Skin is warm and dry.    Psychiatric: She has a normal mood and affect. Her behavior is normal.       Cervix:  Dilation:  4  Effacement:  75  Station: -2  Presentation: Vertex     Significant Labs:  Lab Results   Component Value Date    GROUPTRH A POS 11/15/2017    HEPBSAG Negative 2017    STREPBCULT No Group B Streptococcus isolated 10/30/2017       I have personallly reviewed all pertinent lab results from the last 24 hours.    Assessment/Plan:     22 y.o. female  at 38w0d for:    * Threatened labor    Observation, NST, SVE, IVF,pain, meds            Gris Briseno CNM  Obstetrics  [facility]

## 2017-11-15 NOTE — PROGRESS NOTES
[facility]  Obstetrics  Labor Progress Note    Patient Name: Gayla Montana  MRN: [id number]  Admission Date: 11/15/2017  Hospital Length of Stay: 0 days  Attending Physician: Evy Briseno MD  Primary Care Provider: Primary Doctor No    Subjective:     Principal Problem:Normal labor    Hospital Course:  Admit for labor, ok for DUTCH    Interval History:  Gayla is a 22 y.o.  at 38w0d. She is doing ok but having pain again and wants to be examined.    Objective:     Vital Signs (Most Recent):  Pulse: 97 (11/15/17 1431)  BP: 124/79 (11/15/17 1431) Vital Signs (24h Range):  Pulse:  [] 97  BP: (124-155)/(79-96) 124/79        There is no height or weight on file to calculate BMI.    FHT: 130 Cat 1 (reassuring)  TOCO:  Q 2-3 minutes    Cervical Exam:  Dilation:  5  Effacement:  90  Station: -2  Presentation: Vertex     Significant Labs:  Lab Results   Component Value Date    GROUPTRH A POS 11/15/2017    HEPBSAG Negative 2017    STREPBCULT No Group B Streptococcus isolated 10/30/2017       I have personallly reviewed all pertinent lab results from the last 24 hours.    Physical Exam:   Constitutional: She appears well-developed and well-nourished.                                 Assessment/Plan:     22 y.o. female  at 38w0d for:    * Normal labor    Admit for labor, ok for DUTCH              Gris Briseno CNM  Obstetrics  [facility]

## 2017-11-16 PROBLEM — Z37.9 NORMAL LABOR: Status: RESOLVED | Noted: 2017-11-15 | Resolved: 2017-11-16

## 2017-11-16 PROCEDURE — 11000001 HC ACUTE MED/SURG PRIVATE ROOM

## 2017-11-16 PROCEDURE — 63600175 PHARM REV CODE 636 W HCPCS: Performed by: ADVANCED PRACTICE MIDWIFE

## 2017-11-16 PROCEDURE — 25000003 PHARM REV CODE 250: Performed by: ADVANCED PRACTICE MIDWIFE

## 2017-11-16 RX ORDER — IBUPROFEN 600 MG/1
600 TABLET ORAL EVERY 6 HOURS
Status: DISCONTINUED | OUTPATIENT
Start: 2017-11-16 | End: 2017-11-17 | Stop reason: HOSPADM

## 2017-11-16 RX ORDER — ONDANSETRON 8 MG/1
8 TABLET, ORALLY DISINTEGRATING ORAL EVERY 8 HOURS PRN
Status: DISCONTINUED | OUTPATIENT
Start: 2017-11-16 | End: 2017-11-17 | Stop reason: HOSPADM

## 2017-11-16 RX ORDER — ACETAMINOPHEN 325 MG/1
650 TABLET ORAL EVERY 6 HOURS PRN
Status: DISCONTINUED | OUTPATIENT
Start: 2017-11-16 | End: 2017-11-17 | Stop reason: HOSPADM

## 2017-11-16 RX ORDER — HYDROCODONE BITARTRATE AND ACETAMINOPHEN 10; 325 MG/1; MG/1
1 TABLET ORAL EVERY 4 HOURS PRN
Status: DISCONTINUED | OUTPATIENT
Start: 2017-11-16 | End: 2017-11-17 | Stop reason: HOSPADM

## 2017-11-16 RX ORDER — PRENATAL WITH FERROUS FUM AND FOLIC ACID 3080; 920; 120; 400; 22; 1.84; 3; 20; 10; 1; 12; 200; 27; 25; 2 [IU]/1; [IU]/1; MG/1; [IU]/1; MG/1; MG/1; MG/1; MG/1; MG/1; MG/1; UG/1; MG/1; MG/1; MG/1; MG/1
1 TABLET ORAL DAILY
Status: DISCONTINUED | OUTPATIENT
Start: 2017-11-16 | End: 2017-11-17 | Stop reason: HOSPADM

## 2017-11-16 RX ORDER — DOCUSATE SODIUM 100 MG/1
100 CAPSULE, LIQUID FILLED ORAL DAILY PRN
Status: DISCONTINUED | OUTPATIENT
Start: 2017-11-16 | End: 2017-11-17 | Stop reason: HOSPADM

## 2017-11-16 RX ORDER — HYDROCORTISONE 25 MG/G
CREAM TOPICAL 3 TIMES DAILY PRN
Status: DISCONTINUED | OUTPATIENT
Start: 2017-11-16 | End: 2017-11-17 | Stop reason: HOSPADM

## 2017-11-16 RX ORDER — DIPHENHYDRAMINE HYDROCHLORIDE 50 MG/ML
25 INJECTION INTRAMUSCULAR; INTRAVENOUS EVERY 4 HOURS PRN
Status: DISCONTINUED | OUTPATIENT
Start: 2017-11-16 | End: 2017-11-17 | Stop reason: HOSPADM

## 2017-11-16 RX ORDER — SIMETHICONE 80 MG
1 TABLET,CHEWABLE ORAL EVERY 6 HOURS PRN
Status: DISCONTINUED | OUTPATIENT
Start: 2017-11-16 | End: 2017-11-17 | Stop reason: HOSPADM

## 2017-11-16 RX ORDER — HYDROCODONE BITARTRATE AND ACETAMINOPHEN 5; 325 MG/1; MG/1
1 TABLET ORAL EVERY 4 HOURS PRN
Status: DISCONTINUED | OUTPATIENT
Start: 2017-11-16 | End: 2017-11-17 | Stop reason: HOSPADM

## 2017-11-16 RX ORDER — OXYTOCIN/RINGER'S LACTATE 20/1000 ML
41.65 PLASTIC BAG, INJECTION (ML) INTRAVENOUS CONTINUOUS
Status: ACTIVE | OUTPATIENT
Start: 2017-11-16 | End: 2017-11-16

## 2017-11-16 RX ADMIN — HYDROCODONE BITARTRATE AND ACETAMINOPHEN 1 TABLET: 10; 325 TABLET ORAL at 02:11

## 2017-11-16 RX ADMIN — VITAMIN A, VITAMIN C, VITAMIN D-3, VITAMIN E, VITAMIN B-1, VITAMIN B-2, NIACIN, VITAMIN B-6, CALCIUM, IRON, ZINC, COPPER 1 EACH: 4000; 120; 400; 22; 1.84; 3; 20; 10; 1; 12; 200; 27; 25; 2 TABLET ORAL at 08:11

## 2017-11-16 RX ADMIN — HYDROCODONE BITARTRATE AND ACETAMINOPHEN 1 TABLET: 5; 325 TABLET ORAL at 08:11

## 2017-11-16 RX ADMIN — IBUPROFEN 600 MG: 600 TABLET, FILM COATED ORAL at 12:11

## 2017-11-16 RX ADMIN — IBUPROFEN 600 MG: 600 TABLET, FILM COATED ORAL at 06:11

## 2017-11-16 RX ADMIN — IBUPROFEN 600 MG: 600 TABLET, FILM COATED ORAL at 05:11

## 2017-11-16 NOTE — LACTATION NOTE
This note was copied from a baby's chart.  Lactation Rounds:    Baby is showing feeding cues. Helped mother to settle in a football hold position on the right breast. Reviewed deep asymmetric latch and proper positioning. Mother is able to demonstrate back and deep latch easily obtained. Audible swallows noted, and mother denies pain or discomfort.feeding in progress, infant intermittently clicking. Mother istructed to keep an eye on it and let nurse know if it is continuous.     Lactation packet given and admit information reviewed. Mother verbalizes understanding of expected  behaviors and output for the first 48 hours of life.  Discussed the importance of cue based feedings on demand, unrestricted access to the breast, and frequent uninterrupted skin to skin contact.  Risk and implications of artificial nipples and supplementation discussed.  Encouraged mother to call for assistance when desired or when infant is showing signs of hunger, contact number provided, mother verbalizes understanding.

## 2017-11-16 NOTE — ANESTHESIA PROCEDURE NOTES
Epidural    Patient location during procedure: OB   Reason for block: primary anesthetic   Diagnosis: iup   Start time: 11/15/2017 6:05 PM  Timeout: 11/15/2017 6:03 PM  End time: 11/16/2017 6:15 PM  Staffing  Anesthesiologist: NELSON ARIAS  Resident/CRNA: RADHA MUJICA  Preanesthetic Checklist  Completed: patient identified, surgical consent, pre-op evaluation, timeout performed, IV checked, risks and benefits discussed, monitors and equipment checked, anesthesia consent given, hand hygiene performed and patient being monitored  Preparation  Patient position: sitting  Prep: Betadine  Patient monitoring: Pulse Ox and Blood Pressure  Epidural  Skin Anesthetic: lidocaine 1%  Skin Wheal: 3 mL  Administration type: continuous  Approach: midline  Interspace: L3-4  Injection technique: RIDDHI air  Needle and Epidural Catheter  Needle type: Tuohy   Needle gauge: 18  Needle length: 3.5 inches  Needle insertion depth: 7 cm  Catheter type: multi-orifice  Catheter size: 20 G  Catheter at skin depth: 13 cm  Test dose: 3 mL of lidocaine 1.5% with Epi 1-to-200,000  Additional Documentation: negative aspiration for heme and CSF, no paresthesia on injection, no significant pain on injection, no signs/symptoms of IV or SA injection, incremental injection and no significant complaints from patient  Needle localization: anatomical landmarks  Medications:  Bolus administered: 12 mL of 0.2% chloroprocaine  Epinephrine added: none  Volume per aspiration: 3 mL  Time between aspirations: 1 minutes  Assessment  Upper dermatomal levels - Left: T10  Right: T10  Lower dermatomal level: T10   Dermatomal levels determined by pinch or prick  Ease of block: easy  Patient's tolerance of the procedure: comfortable throughout block  Post dural Puncture Headache?: No

## 2017-11-16 NOTE — ANESTHESIA POSTPROCEDURE EVALUATION
Anesthesia Post Evaluation    Patient: Gayla Montana    Procedure(s) Performed: * No procedures listed *    Final Anesthesia Type: epidural  Patient location during evaluation: labor & delivery  Patient participation: Yes- Able to Participate  Level of consciousness: awake and alert  Post-procedure vital signs: reviewed and stable  Pain management: adequate  Airway patency: patent  PONV status at discharge: No PONV  Anesthetic complications: no      Cardiovascular status: blood pressure returned to baseline  Respiratory status: unassisted  Hydration status: euvolemic  Follow-up not needed.        Visit Vitals  /67   Pulse 84   Temp 36.9 °C (98.5 °F) (Oral)   Resp 20   LMP 02/22/2017   Breastfeeding? Unknown       Pain/Aurelia Score: Pain Rating Prior to Med Admin: 4 (11/16/2017 12:48 AM)  Pain Rating Post Med Admin: 2 (11/16/2017  1:30 AM)

## 2017-11-16 NOTE — PROGRESS NOTES
[facility] -   Obstetrics  Postpartum Progress Note    Patient Name: Gayla Montana  MRN: [id number]  Admission Date: 11/15/2017  Hospital Length of Stay: 1 days  Attending Physician: Evy Briseno MD  Primary Care Provider: Primary Doctor No    Subjective:     Principal Problem:Normal labor    Hospital course: Admit for labor, ok for DUTCH  NVD of viable male infant      Interval History:     She is doing well this morning. She is tolerating a regular diet without nausea or vomiting. She is voiding spontaneously. She is ambulating. Vaginal bleeding is mild. She denies fever or chills. Abdominal pain is mild and controlled with oral medications. She is breastfeeding. She desires circumcision for her male baby: yes, delayed x 8 days.    Objective:     Vital Signs (Most Recent):  Temp: 98.4 °F (36.9 °C) (11/16/17 0800)  Pulse: 73 (11/16/17 0800)  Resp: 20 (11/15/17 2318)  BP: 129/83 (11/16/17 0800) Vital Signs (24h Range):  Temp:  [98.3 °F (36.8 °C)-98.5 °F (36.9 °C)] 98.4 °F (36.9 °C)  Pulse:  [] 73  Resp:  [18-20] 20  BP: (114-155)/(59-96) 129/83        There is no height or weight on file to calculate BMI.      Intake/Output Summary (Last 24 hours) at 11/16/17 0921  Last data filed at 11/16/17 0015   Gross per 24 hour   Intake          4495.83 ml   Output              450 ml   Net          4045.83 ml       Significant Labs:  Lab Results   Component Value Date    GROUPTRH A POS 11/15/2017    HEPBSAG Negative 04/12/2017    STREPBCULT No Group B Streptococcus isolated 10/30/2017       Recent Labs  Lab 11/15/17  1440   HGB 11.5*   HCT 34.8*       I have personallly reviewed all pertinent lab results from the last 24 hours.    Physical Exam:   Constitutional: She is oriented to person, place, and time. She appears well-developed and well-nourished.    HENT:   Head: Normocephalic.     Neck: Normal range of motion.     Pulmonary/Chest: Effort normal.        Abdominal: Soft.     Genitourinary:  Vagina normal and uterus normal.           Musculoskeletal: Normal range of motion and moves all extremeties.       Neurological: She is alert and oriented to person, place, and time.    Skin: Skin is warm and dry.    Psychiatric: She has a normal mood and affect.       Assessment/Plan:     22 y.o. female  for:    First degree perineal laceration    Minimal pain        Single liveborn    Viable male infant   Breastfeeding         (normal spontaneous vaginal delivery)    NVD of viable male infant            Disposition: As patient meets milestones, will plan to discharge tomorrow.    Alonso Marks CNM  Obstetrics  [facility] [place]

## 2017-11-16 NOTE — L&D DELIVERY NOTE
Delivery Information for  Tomi Montana    Birth information:  YOB: 2016   Time of birth: 11:12 PM   Sex: male   Head Delivery Date/Time: 11/15/2017 11:11 PM   Delivery type: Vaginal, Spontaneous Delivery   Gestational Age: 38w0d    Delivery Providers    Delivering clinician:  Evy Briseno MD   Other personnel:   Provider Role   Karen Angeles RN Registered Nurse                    Assessment    Living status:  Living  Apgars:     1 Minute:   5 Minute:   10 Minute:   15 Minute:   20 Minute:     Skin Color:   0  1       Heart Rate:   2  2       Reflex Irritability:   2  2       Muscle Tone:   2  2       Respiratory Effort:   2  2       Total:   8  9               Apgars Assigned By:  CARLOS A SAUCEDO RN         Assisted Delivery Details:    Forceps attempted?:  No  Vacuum extractor attempted?:  No         Shoulder Dystocia    Shoulder dystocia present?:  No           Presentation and Position    Presentation:   Vertex   Position:   Left    Occiput    Anterior            Interventions/Resuscitation         Cord    Complications:  None  Delayed Cord Clamping?:  Yes  Cord Clamped Date/Time:  11/15/2017 11:14 PM  Cord Blood Disposition:  Lab  Gases Sent?:  No  Stem Cell Collection (by MD):  No             Labor Events:       labor: No     Labor Onset Date/Time: 11/15/2017 03:00     Dilation Complete Date/Time:         Start Pushing Date/Time:       Rupture Date/Time:              Rupture type:           Fluid Amount:        Fluid Color:        Fluid Odor:        Membrane Status (PeriCalm): ARM (Artificial Rupture)      Rupture Date/Time (PeriCalm): 11/15/2017 20:48:00      Fluid Amount (PeriCalm): Moderate      Fluid Color (PeriCalm): Clear       steroids: None     Antibiotics given for GBS: No     Induction: none     Indications for induction:        Augmentation:       Indications for augmentation:       Labor complications: None     Additional complications:           Cervical ripening:                     Delivery:      Episiotomy: None     Indication for Episiotomy:       Perineal Lacerations:   Repaired:      Periurethral Laceration:   Repaired:     Labial Laceration:   Repaired:     Sulcus Laceration:   Repaired:     Vaginal Laceration:   Repaired:     Cervical Laceration:   Repaired:     Repair suture:       Repair # of packets:       Vaginal delivery QBL (mL):        QBL (mL): 0     Combined Blood Loss (mL): 0     Vaginal Sweep Performed:       Surgicount Correct:         Other providers:       Anesthesia    Method:  Epidural          Details (if applicable):  Trial of Labor      Categorization:      Priority:     Indications for :     Incision Type:       Additional  information:  Forceps:    Vacuum:    Breech:    Observed anomalies    Other (Comments):          over intact perineum with tear at 5 oclock position--requiring 1 figure of eight suture of 3-0 chromic  Vigorous male infant placed on mom's abdomen  Cord clamped/cut after 1 minute  Placenta spontaneous  ebl approx 200cc  No complications  Patient tolerated procedure well

## 2017-11-16 NOTE — SUBJECTIVE & OBJECTIVE
Hospital course: Admit for labor, ok for DUTCH  NVD of viable male infant      Interval History:     She is doing well this morning. She is tolerating a regular diet without nausea or vomiting. She is voiding spontaneously. She is ambulating. Vaginal bleeding is mild. She denies fever or chills. Abdominal pain is mild and controlled with oral medications. She is breastfeeding. She desires circumcision for her male baby: yes, delayed x 8 days.    Objective:     Vital Signs (Most Recent):  Temp: 98.4 °F (36.9 °C) (11/16/17 0800)  Pulse: 73 (11/16/17 0800)  Resp: 20 (11/15/17 2318)  BP: 129/83 (11/16/17 0800) Vital Signs (24h Range):  Temp:  [98.3 °F (36.8 °C)-98.5 °F (36.9 °C)] 98.4 °F (36.9 °C)  Pulse:  [] 73  Resp:  [18-20] 20  BP: (114-155)/(59-96) 129/83        There is no height or weight on file to calculate BMI.      Intake/Output Summary (Last 24 hours) at 11/16/17 0921  Last data filed at 11/16/17 0015   Gross per 24 hour   Intake          4495.83 ml   Output              450 ml   Net          4045.83 ml       Significant Labs:  Lab Results   Component Value Date    GROUPTRH A POS 11/15/2017    HEPBSAG Negative 04/12/2017    STREPBCULT No Group B Streptococcus isolated 10/30/2017       Recent Labs  Lab 11/15/17  1440   HGB 11.5*   HCT 34.8*       I have personallly reviewed all pertinent lab results from the last 24 hours.    Physical Exam:   Constitutional: She is oriented to person, place, and time. She appears well-developed and well-nourished.    HENT:   Head: Normocephalic.     Neck: Normal range of motion.     Pulmonary/Chest: Effort normal.        Abdominal: Soft.     Genitourinary: Vagina normal and uterus normal.           Musculoskeletal: Normal range of motion and moves all extremeties.       Neurological: She is alert and oriented to person, place, and time.    Skin: Skin is warm and dry.    Psychiatric: She has a normal mood and affect.

## 2017-11-16 NOTE — TRANSFER OF CARE
Anesthesia Transfer of Care Note    Patient: Gayla Montana    Procedure(s) Performed: * No procedures listed *    Patient location: Labor and Delivery    Anesthesia Type: epidural    Post pain: adequate analgesia    Post assessment: no apparent anesthetic complications    Post vital signs: stable    Level of consciousness: awake    Nausea/Vomiting: no nausea/vomiting    Complications: none    Transfer of care protocol was followed      Last vitals:   Visit Vitals  /67   Pulse 84   Temp 36.9 °C (98.5 °F) (Oral)   Resp 20   LMP 02/22/2017   Breastfeeding? Unknown

## 2017-11-16 NOTE — PLAN OF CARE
Problem: Patient Care Overview  Goal: Plan of Care Review  Outcome: Ongoing (interventions implemented as appropriate)  Pt stable, some complaints of cramping with minimal bleeding. Ambulates and voids without difficulty. POC discussed and is ongoing.

## 2017-11-16 NOTE — PLAN OF CARE
Problem: Patient Care Overview  Goal: Individualization & Mutuality  Outcome: Ongoing (interventions implemented as appropriate)  Patient doing well; VSS; breastfeeding and bonding well with baby; mother at bedside.

## 2017-11-17 VITALS
TEMPERATURE: 99 F | DIASTOLIC BLOOD PRESSURE: 76 MMHG | SYSTOLIC BLOOD PRESSURE: 122 MMHG | HEART RATE: 69 BPM | RESPIRATION RATE: 18 BRPM

## 2017-11-17 PROCEDURE — 25000003 PHARM REV CODE 250: Performed by: ADVANCED PRACTICE MIDWIFE

## 2017-11-17 PROCEDURE — 99024 POSTOP FOLLOW-UP VISIT: CPT | Mod: ,,, | Performed by: ADVANCED PRACTICE MIDWIFE

## 2017-11-17 RX ADMIN — IBUPROFEN 600 MG: 600 TABLET, FILM COATED ORAL at 05:11

## 2017-11-17 RX ADMIN — VITAMIN A, VITAMIN C, VITAMIN D-3, VITAMIN E, VITAMIN B-1, VITAMIN B-2, NIACIN, VITAMIN B-6, CALCIUM, IRON, ZINC, COPPER 1 TABLET: 4000; 120; 400; 22; 1.84; 3; 20; 10; 1; 12; 200; 27; 25; 2 TABLET ORAL at 09:11

## 2017-11-17 RX ADMIN — IBUPROFEN 600 MG: 600 TABLET, FILM COATED ORAL at 12:11

## 2017-11-17 NOTE — LACTATION NOTE
This note was copied from a baby's chart.     11/17/17 1100   Infant Assessment   Weight Loss (%) 3.1   Number of Stools (24 hours) 5   Number of Voids (24 hours) 6   Lactation Interventions   Attachment Promotion skin-to-skin contact encouraged;rooming-in promoted;role responsibility promoted;privacy provided;infant-mother separation minimized;family involvement promoted;face-to-face positioning promoted;environment adjusted;counseling provided;breastfeeding assistance provided   Breastfeeding Assistance assisted with positioning;both breasts offered each feeding;feeding cue recognition promoted;feeding on demand promoted;support offered   Maternal Breastfeeding Support infant-mother separation minimized;lactation counseling provided;encouragement offered   Latch Promotion positioning assisted

## 2017-11-17 NOTE — DISCHARGE SUMMARY
[facility] -   Obstetrics  Discharge Summary      Patient Name: Gayla Montana  MRN: [id number]  Admission Date: 11/15/2017  Hospital Length of Stay: 2 days  Discharge Date and Time: No discharge date for patient encounter.  Attending Physician: Evy Briseno MD   Discharging Provider: Marian Lange CNM  Primary Care Provider: Primary Doctor No    HPI: Contractions since 0300    * No surgery found *     Hospital Course:   Admit for labor, ok for DUTCH  NVD of viable male infant  17 Day 2  Breast         Final Active Diagnoses:    Diagnosis Date Noted POA     (normal spontaneous vaginal delivery) [O80] 2017 Not Applicable    Single liveborn [Z38.2] 2017 Unknown    First degree perineal laceration [O70.0] 2017 Unknown      Problems Resolved During this Admission:    Diagnosis Date Noted Date Resolved POA    PRINCIPAL PROBLEM:  Normal labor [O80, Z37.9] 11/15/2017 2017 Not Applicable    Threatened labor [O47.9] 11/15/2017 11/15/2017 Unknown        Labs:   CBC   Recent Labs  Lab 11/15/17  1440   WBC 13.54*   HGB 11.5*   HCT 34.8*          Feeding Method: breast    Immunizations     Date Immunization Status Dose Route/Site Given by    17 MMR Deferred (Other) 0.5 mL Subcutaneous/Left deltoid Sharri Tapia RN    17 Tdap Deferred 0.5 mL Intramuscular/Left deltoid Sharri Tapia RN          Delivery:    Episiotomy: None   Lacerations: 1st   Repair suture:     Repair # of packets: 1   Blood loss (ml): 200     Birth information:  YOB: 2016   Time of birth: 11:12 PM   Sex: male   Delivery type: Vaginal, Spontaneous Delivery   Gestational Age: 38w0d    Delivery Clinician:      Other providers:       Additional  information:  Forceps:    Vacuum:    Breech:    Observed anomalies      Living?:           APGARS  One minute Five minutes Ten minutes   Skin color:         Heart rate:         Grimace:         Muscle tone:          Breathing:         Totals: 8  9        Placenta: Delivered:       appearance    Pending Diagnostic Studies:     None        Vaginal Delivery Progress Note  Obstetrics        SUBJECTIVE:     Postpartum Day 2: Vaginal Delivery    Ms. Montana states she feels well. She denies emotional concerns. Her pain is well controlled with current medications. The baby is well. The baby is feeding via breast. The patient is ambulating well. Ms. Montana is tolerating a normal diet. Flatus has been passed. Urinary output is adequate.    OBJECTIVE:     Vital Signs (Most Recent):  Temp: 98.3 °F (36.8 °C) (11/17/17 0800)  Pulse: 75 (11/17/17 0800)  Resp: 18 (11/17/17 0800)  BP: 107/63 (11/17/17 0800)    Vital Signs Range (Last 24H):  Temp:  [97.5 °F (36.4 °C)-98.7 °F (37.1 °C)]   Pulse:  [67-75]   Resp:  [18-19]   BP: (107-128)/(63-76)     I & O (Last 24H):No intake or output data in the 24 hours ending 11/17/17 1247  Physical Exam:  General:    well developed, well nourished, no distress   Lungs:  clear to auscultation bilaterally   Heart:  regular rate and rhythm   Breasts:  no discharge, erythema, or tenderness and patient is lactating, no areas of erythema or tenderness noted, nipples are normal   Abdomen:  normal findings: soft, non-tender   Fundus:  firm   Lochia:  scant rubra   Episiotomy:   N/A   DVT Evaluation:  No evidence of DVT on either side seen on physical exam.     Hemoglobin/Hematocrit    Recent Labs  Lab 11/15/17  1440   HGB 11.5*   HCT 34.8*     Group & Rh  @LABGroup & Rh@  Rubella  No results for input(s): RUBELLAIGGSC in the last 168 hours.    ASSESSMENT/PLAN:     Status post vaginal delivery. Doing well.     Discharge home with standard precautions and return to clinic in 4-6 weeks.    Discharged Condition: good    Disposition: Home or Self Care    Follow Up:  Follow-up Information     Follow up In 4 weeks.               Patient Instructions:     Diet general     Activity as tolerated     Other  restrictions (specify):   Order Comments: Avoid driving for 2 weeks     Call MD for:  temperature >100.4     Call MD for:  severe uncontrolled pain     Call MD for:  severe persistent headache     Call MD for:   Order Comments: Depression, increased bleeding, fever, breast issues or any concerns       Medications:  Current Discharge Medication List      CONTINUE these medications which have NOT CHANGED    Details   ferrous sulfate 325 mg (65 mg iron) Tab tablet Take 1 tablet (325 mg total) by mouth once daily.  Qty: 30 tablet, Refills: 3             Marian Lange CNM  Obstetrics  [facility]

## 2017-11-17 NOTE — DISCHARGE INSTRUCTIONS
Mother Self Care:    Activity: Avoid strenuous exercise and get adequate rest.  No driving until your physician gives you consent.  Emotional Changes: The grieving process has many different stages, be prepared to experience lots of emotional ups and downs. Identify people to be your support system, and do not hesitate to call our  if you need someone to talk to.   Breast Care: You may notice milk leaking from your breasts. Wear a support bra 24 hours a day for one week or wrap breasts in an ace bandage if needed to stop milk production.  Avoid stimulation to breasts.  You may use ice packs for discomfort.  Walker-Care/Vaginal Bleeding: Remember to use your walker-bottle after urinating.  Your flow will change from red, to pink, to yellow/white color over a period of 2 weeks.  Menstruation will return in 3-8 weeks.  Episiotomy Vaginal Delivery: Stitches will dissolve within 10 days to 3 weeks.  Warm baths, tucks, and dermoplast spray will promote healing.  Avoid bubble baths or strong soaps.  Sexual Activity/Pelvic Rest: No sexual activity, tampons, or douching until your physician gives you consent.  Diet: Continue to eat from the five basic food groups, including plenty of protein, fruits, vegetables, and whole grains.  Limit empty calories and high fat foods.  Drink enough fluids to satisfy thirst.  Constipation/Hemorrhoids: Drink plenty of water.  You may take a stool softener or natural laxative (Metamucil). You may use tucks or hemorrhoid ointment and soak in a warm tub.    CALL YOUR OB DOCTOR IF ANY OF THE FOLLOWING OCCURS:  *Heavy bleeding - saturating a pad an hour or passing any large (2-3 inches in size) blood clots.  *Any pain, redness, or tenderness in lower leg.  *You cannot care for yourself  *Any signs of infection-      - Temperature greater than 100.5 degrees F      - Foul smelling vaginal discharge and/or incisional drainage      - Increased episiotomy or incisional pain      - Hot, hard,  red or sore area on breast      - Flu-like symptoms      - Any urgency, frequency or burning with urination

## 2017-11-17 NOTE — LACTATION NOTE
This note was copied from a baby's chart.  Infant weight loss and output is WDL.   Lactation discharge information reviewed.  Mother is aware of warm line, and outpatient consultations and monthly support gatherings. Encouraged mother to contact lactation with any questions, concerns, or problems. Contact numbers provided, and mother verbalizes understanding.  Mother states that she is comfortable latching baby on- will call lactation for further latching needs.

## 2017-12-18 ENCOUNTER — POSTPARTUM VISIT (OUTPATIENT)
Dept: OBSTETRICS AND GYNECOLOGY | Facility: CLINIC | Age: 22
End: 2017-12-18
Payer: COMMERCIAL

## 2017-12-18 VITALS
SYSTOLIC BLOOD PRESSURE: 112 MMHG | WEIGHT: 148.81 LBS | HEIGHT: 62 IN | DIASTOLIC BLOOD PRESSURE: 70 MMHG | BODY MASS INDEX: 27.38 KG/M2

## 2017-12-18 PROCEDURE — 99999 PR PBB SHADOW E&M-EST. PATIENT-LVL III: CPT | Mod: PBBFAC,,, | Performed by: ADVANCED PRACTICE MIDWIFE

## 2017-12-18 PROCEDURE — 0503F POSTPARTUM CARE VISIT: CPT | Mod: S$GLB,,, | Performed by: ADVANCED PRACTICE MIDWIFE

## 2017-12-18 NOTE — PROGRESS NOTES
Subjective:       Patient ID: Gayla Montana is a 22 y.o. female.    Chief Complaint:  Postpartum Care      History of Present Illness  Pt here for routine postpartum exam, breastfeeding well, does not want birth control       .    GYN & OB History  Patient's last menstrual period was 2017.   Date of Last Pap: 2017    OB History    Para Term  AB Living   2 2 2     2   SAB TAB Ectopic Multiple Live Births         0 2      # Outcome Date GA Lbr Dennis/2nd Weight Sex Delivery Anes PTL Lv   2 Term 11/15/17 38w0d  3.225 kg (7 lb 1.8 oz) M Vag-Spont EPI N TOMASA   1 Term 11/30/15 39w2d / 00:26 3.15 kg (6 lb 15.1 oz) F Vag-Spont EPI N TOMASA          Review of Systems  Review of Systems   All other systems reviewed and are negative.          Objective:    Physical Exam:   Constitutional: She is oriented to person, place, and time. Vital signs are normal. She appears well-developed and well-nourished. She is active.    HENT:   Head: Normocephalic and atraumatic.    Eyes: EOM are normal.    Neck: Trachea normal.    Cardiovascular: Normal rate, regular rhythm, normal heart sounds and normal pulses.     Pulmonary/Chest: Effort normal and breath sounds normal.        Abdominal: Soft. Normal appearance.     Genitourinary: Vagina normal and uterus normal. Pelvic exam was performed with patient supine.           Musculoskeletal: Normal range of motion and moves all extremeties.       Neurological: She is alert and oriented to person, place, and time.    Skin: Skin is warm, dry and intact.    Psychiatric: She has a normal mood and affect. Her speech is normal and behavior is normal. Judgment and thought content normal. Cognition and memory are normal.          Assessment:        1. Routine postpartum follow-up                Plan:      Routine PP findings  Discussed birth control options, pt declines at this time  Breastfeeding well   Scheduled with physician for repeat PP colposcopy

## 2018-01-23 ENCOUNTER — PROCEDURE VISIT (OUTPATIENT)
Dept: OBSTETRICS AND GYNECOLOGY | Facility: CLINIC | Age: 23
End: 2018-01-23
Payer: COMMERCIAL

## 2018-01-23 VITALS
HEIGHT: 62 IN | WEIGHT: 148.81 LBS | DIASTOLIC BLOOD PRESSURE: 82 MMHG | BODY MASS INDEX: 27.38 KG/M2 | SYSTOLIC BLOOD PRESSURE: 123 MMHG

## 2018-01-23 DIAGNOSIS — N87.1 CIN II (CERVICAL INTRAEPITHELIAL NEOPLASIA II): Primary | ICD-10-CM

## 2018-01-23 PROCEDURE — 81025 URINE PREGNANCY TEST: CPT | Mod: PBBFAC | Performed by: OBSTETRICS & GYNECOLOGY

## 2018-01-23 PROCEDURE — 57454 BX/CURETT OF CERVIX W/SCOPE: CPT | Mod: PBBFAC | Performed by: OBSTETRICS & GYNECOLOGY

## 2018-01-23 PROCEDURE — 88305 TISSUE EXAM BY PATHOLOGIST: CPT | Mod: 26,,, | Performed by: PATHOLOGY

## 2018-01-23 PROCEDURE — 88305 TISSUE EXAM BY PATHOLOGIST: CPT | Performed by: PATHOLOGY

## 2018-01-23 NOTE — PROCEDURES
Colposcopy-Today  Date/Time: 2018 2:47 PM  Performed by: SHYLA ALEXIS  Authorized by: SHYLA ALEXIS     Consent Done?:  Yes (Written)  Assistants?: No      Colposcopy Site:  Cervix  Position:  Supine  Acrowhite Lesion? Yes    Atypical Vessels: No    Transformation Zone Adequate?: Yes    Biopsy?: Yes         Location:  Cervix ((6 00 and 11 00))  ECC Performed?: Yes    LEEP Performed?: No    Estimated blood loss (cc):  2   Patient tolerated the procedure well with no immediate complications.   Post-operative instructions were provided for the patient.   Patient was discharged and will follow up if any complications occur     COLPOSCOPY:    Gayla Montana is a 22 y.o. female   presents for colposcopy.  Patient's last menstrual period was 2017..  Her most recent pap smear shows high-grade squamous intraepithelial neoplasia  (HGSIL-encompassing moderate and severe dysplasia) and colpo during recent pregnancy showed MITZI 2.      The abnormal test findings were discussed, as well as HPV infection, need for colposcopy and possible biopsies to determine the plan of care, treatments available, the minimal risk of bleeding and infection with colposcopy, and alternatives to colposcopy.  Pt voiced understanding and desires to proceed.      UPT is negative    COLPOSCOPY EXAM:   TIME OUT PERFORMED.     No gross vulvovaginal or cervix lesions noted.  On colpo: no abnormal vasculature, acetowhite lesion(s) noted at 6 o'clock and mosaicism noted at 11 o'clock    Biopsy was taken at 6 and 11 o'clock.  ECC was performed.   SCJ was entirely visualized.    Hemostasis was adequate with application of Monsel's solution.  The speculum was removed.  The patient did tolerate the procedure well.    All collected specimens sent to pathology for histologic analysis.    Post-colposcopy counseling:  The patient was instructed to manage post-colposcopy cramping with NSAIDs or Tylenol, or with a prescription per the  medication card.  Avoid intercourse, douching, or tampons in the vagina for at least 2-3 days.  Expect a clumpy blackish discharge due to Monsel's solution application for several days.  Report heavy bleeding, worsening pain or pain that does not respond to above medications, or foul-smelling vaginal discharge. HPV vaccine recommended according to FDA age guidelines.  Importance of follow-up stressed.      Follow up based on colposcopy results.  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  If confirmed by pathology results, recommend proceeding with excisional procedure (i.e LEEP).  Pt counseled on procedure details.

## 2018-01-26 ENCOUNTER — TELEPHONE (OUTPATIENT)
Dept: OBSTETRICS AND GYNECOLOGY | Facility: CLINIC | Age: 23
End: 2018-01-26

## 2018-01-26 NOTE — TELEPHONE ENCOUNTER
Called and spoke with pt.  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  Recommend proceeding with LEEP procedure as was discussed at her last visit.  Pt voiced understanding and questions were answered.  Will have nurse contact pt to assist with scheduling for LEEP.

## 2018-02-28 ENCOUNTER — PROCEDURE VISIT (OUTPATIENT)
Dept: OBSTETRICS AND GYNECOLOGY | Facility: CLINIC | Age: 23
End: 2018-02-28
Payer: COMMERCIAL

## 2018-02-28 VITALS
BODY MASS INDEX: 28.52 KG/M2 | DIASTOLIC BLOOD PRESSURE: 68 MMHG | WEIGHT: 155 LBS | SYSTOLIC BLOOD PRESSURE: 106 MMHG | HEIGHT: 62 IN

## 2018-02-28 DIAGNOSIS — D06.9 CIN III (CERVICAL INTRAEPITHELIAL NEOPLASIA GRADE III) WITH SEVERE DYSPLASIA: Primary | ICD-10-CM

## 2018-02-28 PROCEDURE — 57505 ENDOCERVICAL CURETTAGE: CPT | Mod: 51,S$GLB,, | Performed by: OBSTETRICS & GYNECOLOGY

## 2018-02-28 PROCEDURE — 81025 URINE PREGNANCY TEST: CPT | Mod: S$GLB,,, | Performed by: OBSTETRICS & GYNECOLOGY

## 2018-02-28 PROCEDURE — 88342 IMHCHEM/IMCYTCHM 1ST ANTB: CPT | Mod: 26,,, | Performed by: PATHOLOGY

## 2018-02-28 PROCEDURE — 88305 TISSUE EXAM BY PATHOLOGIST: CPT | Performed by: PATHOLOGY

## 2018-02-28 PROCEDURE — 57522 CONIZATION OF CERVIX: CPT | Mod: S$GLB,,, | Performed by: OBSTETRICS & GYNECOLOGY

## 2018-02-28 PROCEDURE — 88305 TISSUE EXAM BY PATHOLOGIST: CPT | Mod: 26,,, | Performed by: PATHOLOGY

## 2018-03-19 ENCOUNTER — PATIENT MESSAGE (OUTPATIENT)
Dept: OBSTETRICS AND GYNECOLOGY | Facility: CLINIC | Age: 23
End: 2018-03-19

## 2018-04-20 ENCOUNTER — PATIENT MESSAGE (OUTPATIENT)
Dept: OBSTETRICS AND GYNECOLOGY | Facility: CLINIC | Age: 23
End: 2018-04-20

## 2018-04-20 ENCOUNTER — TELEPHONE (OUTPATIENT)
Dept: OBSTETRICS AND GYNECOLOGY | Facility: CLINIC | Age: 23
End: 2018-04-20

## 2018-04-20 NOTE — TELEPHONE ENCOUNTER
Spoke to patient and scheduled her follow up appointment for 05/16/18 at 2:00pm with Dr. Forrest at the [facility] location. Patient requests birth control medication that was prescribed a year ago by her pcp. It is trisprintec. Patient requests that it be prescribed before her appointment. Informed patient that usually a provider would like to see patient in clinic before prescribing birth control, but that I will send this message to Dr. Forrest per patient request. Advised patient to use condoms in the meantime. Patient verbalized understanding.

## 2018-04-20 NOTE — TELEPHONE ENCOUNTER
----- Message from Kylie Grimm sent at 4/20/2018 11:50 AM CDT -----  Contact: pt  She's calling in regards to appt 4/23 pls call pt back at [phone] (home)   pls call after 1:30

## 2018-05-16 ENCOUNTER — OFFICE VISIT (OUTPATIENT)
Dept: OBSTETRICS AND GYNECOLOGY | Facility: CLINIC | Age: 23
End: 2018-05-16
Payer: MEDICAID

## 2018-05-16 VITALS
WEIGHT: 162.94 LBS | HEIGHT: 62 IN | BODY MASS INDEX: 29.98 KG/M2 | DIASTOLIC BLOOD PRESSURE: 80 MMHG | SYSTOLIC BLOOD PRESSURE: 148 MMHG

## 2018-05-16 DIAGNOSIS — Z98.890 STATUS POST LEEP (LOOP ELECTROSURGICAL EXCISION PROCEDURE) OF CERVIX: Primary | ICD-10-CM

## 2018-05-16 PROCEDURE — 99999 PR PBB SHADOW E&M-EST. PATIENT-LVL III: CPT | Mod: PBBFAC,,, | Performed by: OBSTETRICS & GYNECOLOGY

## 2018-05-16 PROCEDURE — 99024 POSTOP FOLLOW-UP VISIT: CPT | Mod: S$PBB,,, | Performed by: OBSTETRICS & GYNECOLOGY

## 2018-05-16 PROCEDURE — 99213 OFFICE O/P EST LOW 20 MIN: CPT | Mod: PBBFAC | Performed by: OBSTETRICS & GYNECOLOGY

## 2018-05-16 RX ORDER — NORGESTIMATE AND ETHINYL ESTRADIOL 7DAYSX3 LO
KIT ORAL
COMMUNITY
Start: 2018-05-01 | End: 2019-05-23

## 2018-05-16 NOTE — PROGRESS NOTES
Subjective:       Patient ID: Gayla Montana is a 23 y.o. female.    Chief Complaint:  Post-op Evaluation      History of Present Illness  HPI  Pt is s/p LEEP on 18 and is here for follow up visit.  Pt reports no complaints and is doing well.    GYN & OB History  Patient's last menstrual period was 2018.   Date of Last Pap: 2017    OB History    Para Term  AB Living   2 2 2     2   SAB TAB Ectopic Multiple Live Births         0 2      # Outcome Date GA Lbr Dennis/2nd Weight Sex Delivery Anes PTL Lv   2 Term 11/15/17 38w0d  3.225 kg (7 lb 1.8 oz) M Vag-Spont EPI N TOMASA   1 Term 11/30/15 39w2d / 00:26 3.15 kg (6 lb 15.1 oz) F Vag-Spont EPI N TOMASA          Review of Systems  Review of Systems   Constitutional: Negative for activity change, appetite change, fatigue, fever and unexpected weight change.   Respiratory: Negative for shortness of breath.    Cardiovascular: Negative for chest pain.   Gastrointestinal: Negative for abdominal pain.   Genitourinary: Negative for dyspareunia, menorrhagia, menstrual problem, pelvic pain, vaginal bleeding, vaginal discharge, vaginal pain, dysmenorrhea and vaginal odor.   Musculoskeletal: Negative for back pain.   Neurological: Negative for syncope and headaches.           Objective:    Physical Exam:   Constitutional: She is oriented to person, place, and time. She appears well-developed and well-nourished. No distress.       Cardiovascular: Normal rate and regular rhythm.     Pulmonary/Chest: Effort normal.        Abdominal: Soft. Bowel sounds are normal. She exhibits no distension. There is no tenderness.     Genitourinary: Vagina normal and uterus normal. Pelvic exam was performed with patient supine. There is no rash, tenderness, lesion or injury on the right labia. There is no rash, tenderness, lesion or injury on the left labia. Uterus is not deviated, not enlarged and not tender. Cervix is normal. Right adnexum displays no mass, no  tenderness and no fullness. Left adnexum displays no mass, no tenderness and no fullness. No erythema, tenderness or bleeding in the vagina. No foreign body in the vagina. No signs of injury around the vagina. No vaginal discharge found. Cervix exhibits no motion tenderness, no discharge and no friability.           Musculoskeletal: Normal range of motion and moves all extremeties. She exhibits no edema or tenderness.       Neurological: She is alert and oriented to person, place, and time.    Skin: Skin is warm and dry.    Psychiatric: She has a normal mood and affect. Her behavior is normal. Thought content normal.          Assessment:        1. Status post LEEP (loop electrosurgical excision procedure) of cervix             Plan:      Status post LEEP (loop electrosurgical excision procedure) of cervix  -     Pt is doing well.  Pathology results discussed (MITZI 3 with negative margins and negative ECC).  Recommend follow up paps every 4-6 months.      Follow-up in about 4 months (around 9/16/2018) for pap.

## 2018-08-15 ENCOUNTER — OFFICE VISIT (OUTPATIENT)
Dept: OBSTETRICS AND GYNECOLOGY | Facility: CLINIC | Age: 23
End: 2018-08-15
Payer: MEDICAID

## 2018-08-15 VITALS
SYSTOLIC BLOOD PRESSURE: 130 MMHG | HEIGHT: 62 IN | DIASTOLIC BLOOD PRESSURE: 82 MMHG | WEIGHT: 156.06 LBS | BODY MASS INDEX: 28.72 KG/M2

## 2018-08-15 DIAGNOSIS — Z12.4 PAP SMEAR FOR CERVICAL CANCER SCREENING: Primary | ICD-10-CM

## 2018-08-15 PROCEDURE — 88142 CYTOPATH C/V THIN LAYER: CPT

## 2018-08-15 PROCEDURE — 99999 PR PBB SHADOW E&M-EST. PATIENT-LVL III: CPT | Mod: PBBFAC,,, | Performed by: OBSTETRICS & GYNECOLOGY

## 2018-08-15 PROCEDURE — 99213 OFFICE O/P EST LOW 20 MIN: CPT | Mod: S$PBB,,, | Performed by: OBSTETRICS & GYNECOLOGY

## 2018-08-15 PROCEDURE — 99213 OFFICE O/P EST LOW 20 MIN: CPT | Mod: PBBFAC | Performed by: OBSTETRICS & GYNECOLOGY

## 2018-08-15 NOTE — PROGRESS NOTES
Subjective:       Patient ID: aGyla Montana is a 23 y.o. female.    Chief Complaint:  Follow-up (pap smear)      History of Present Illness  HPI  Pt is here for follow up pap.  Pt reports no complaints and is doing well.    GYN & OB History  No LMP recorded.   Date of Last Pap: 2017    OB History    Para Term  AB Living   2 2 2     2   SAB TAB Ectopic Multiple Live Births         0 2      # Outcome Date GA Lbr Dennis/2nd Weight Sex Delivery Anes PTL Lv   2 Term 11/15/17 38w0d  3.225 kg (7 lb 1.8 oz) M Vag-Spont EPI N TOMAAS   1 Term 11/30/15 39w2d / 00:26 3.15 kg (6 lb 15.1 oz) F Vag-Spont EPI N TOMASA          Review of Systems  Review of Systems   Constitutional: Negative for activity change, appetite change, fatigue, fever and unexpected weight change.   Respiratory: Negative for shortness of breath.    Cardiovascular: Negative for chest pain, palpitations and leg swelling.   Gastrointestinal: Negative for abdominal pain, bloating, blood in stool, constipation, diarrhea, nausea and vomiting.   Genitourinary: Negative for dyspareunia, dysuria, flank pain, frequency, genital sores, hematuria, menorrhagia, menstrual problem, pelvic pain, vaginal bleeding, vaginal discharge, vaginal pain, dysmenorrhea, urinary incontinence and vaginal odor.   Musculoskeletal: Negative for back pain.   Neurological: Negative for syncope and headaches.           Objective:    Physical Exam:   Constitutional: She is oriented to person, place, and time. She appears well-developed and well-nourished. No distress.       Cardiovascular: Normal rate and regular rhythm.     Pulmonary/Chest: Effort normal.          Genitourinary: Vagina normal. Pelvic exam was performed with patient supine. There is no rash, tenderness, lesion or injury on the right labia. There is no rash, tenderness, lesion or injury on the left labia. Cervix is normal. No erythema, tenderness or bleeding in the vagina. No foreign body in the vagina. No  signs of injury around the vagina. No vaginal discharge found. Cervix exhibits no motion tenderness, no discharge and no friability. Additional cervical findings: pap smear done          Musculoskeletal: Normal range of motion and moves all extremeties. She exhibits no edema or tenderness.       Neurological: She is alert and oriented to person, place, and time.    Skin: Skin is warm and dry.    Psychiatric: She has a normal mood and affect. Her behavior is normal. Thought content normal.          Assessment:        1. Pap smear for cervical cancer screening             Plan:      Pap smear for cervical cancer screening  -     Liquid-based pap smear, screening  -     Pt was counseled on cervical/vaginal screening guidelines and recommendations.  Pt is s/p LEEP 02/2018 with path MITZI 3 with negative margins and negative ECC.  If today's pap smear result is negative, next pap smear will be due in 6 months.  -     Follow up with PCP for routine health maintenance needs.      Follow-up in about 6 months (around 2/15/2019) for Annual with pap.

## 2019-03-19 ENCOUNTER — TELEPHONE (OUTPATIENT)
Dept: OBSTETRICS AND GYNECOLOGY | Facility: CLINIC | Age: 24
End: 2019-03-19

## 2019-03-19 NOTE — TELEPHONE ENCOUNTER
----- Message from Nimco Gamboa sent at 3/19/2019  3:24 PM CDT -----  Contact: self  Type:  Needs Medical Advice    Who Called: pt  Symptoms (please be specific): heavy consistant bleeding   How long has patient had these symptoms:n/a  Pharmacy name and phone #: n/a  Would the patient rather a call back or a response via [facility]? Call back  Best Call Back Number: [phone]  Additional Information: none    Thanks,  Nimco Gamboa

## 2019-05-06 ENCOUNTER — TELEPHONE (OUTPATIENT)
Dept: OBSTETRICS AND GYNECOLOGY | Facility: CLINIC | Age: 24
End: 2019-05-06

## 2019-05-06 NOTE — TELEPHONE ENCOUNTER
Returned call to patient.  She requested an appt with Dr. Forrest, said that she was to have a follow-up within six months of last visit, per patient.  Last visit 08/2018.  Appointment scheduled 05/23/19 at 10:00am, patient confirmed appt and location.

## 2019-05-06 NOTE — TELEPHONE ENCOUNTER
----- Message from Leana Li sent at 5/6/2019  9:04 AM CDT -----  Contact: pt   Call pt regarding scheduling a appt with Dr. Forrest.    [phone] (home)

## 2019-05-23 ENCOUNTER — OFFICE VISIT (OUTPATIENT)
Dept: OBSTETRICS AND GYNECOLOGY | Facility: CLINIC | Age: 24
End: 2019-05-23
Payer: MEDICAID

## 2019-05-23 VITALS
BODY MASS INDEX: 28.84 KG/M2 | SYSTOLIC BLOOD PRESSURE: 118 MMHG | WEIGHT: 156.75 LBS | DIASTOLIC BLOOD PRESSURE: 80 MMHG | HEIGHT: 62 IN

## 2019-05-23 DIAGNOSIS — Z11.3 SCREEN FOR STD (SEXUALLY TRANSMITTED DISEASE): ICD-10-CM

## 2019-05-23 DIAGNOSIS — Z01.419 WELL WOMAN EXAM WITH ROUTINE GYNECOLOGICAL EXAM: Primary | ICD-10-CM

## 2019-05-23 PROCEDURE — 99999 PR PBB SHADOW E&M-EST. PATIENT-LVL III: CPT | Mod: PBBFAC,,, | Performed by: OBSTETRICS & GYNECOLOGY

## 2019-05-23 PROCEDURE — 99395 PR PREVENTIVE VISIT,EST,18-39: ICD-10-PCS | Mod: S$PBB,,, | Performed by: OBSTETRICS & GYNECOLOGY

## 2019-05-23 PROCEDURE — 99999 PR PBB SHADOW E&M-EST. PATIENT-LVL III: ICD-10-PCS | Mod: PBBFAC,,, | Performed by: OBSTETRICS & GYNECOLOGY

## 2019-05-23 PROCEDURE — 99395 PREV VISIT EST AGE 18-39: CPT | Mod: S$PBB,,, | Performed by: OBSTETRICS & GYNECOLOGY

## 2019-05-23 PROCEDURE — 88175 CYTOPATH C/V AUTO FLUID REDO: CPT

## 2019-05-23 PROCEDURE — 99213 OFFICE O/P EST LOW 20 MIN: CPT | Mod: PBBFAC | Performed by: OBSTETRICS & GYNECOLOGY

## 2019-05-23 PROCEDURE — 87491 CHLMYD TRACH DNA AMP PROBE: CPT

## 2019-05-23 RX ORDER — AMITRIPTYLINE HYDROCHLORIDE 10 MG/1
TABLET, FILM COATED ORAL
COMMUNITY
Start: 2019-03-16 | End: 2020-08-12

## 2019-05-23 NOTE — PROGRESS NOTES
Subjective:       Patient ID: Gayla Montana is a 24 y.o. female.    Chief Complaint:  Annual Exam      History of Present Illness  HPI  Annual Exam-Premenopausal  Patient presents for annual exam. The patient has no complaints today. The patient is not sexually active. GYN screening history: last pap: approximate date 2015 and was normal. The patient wears seatbelts: yes. The patient participates in regular exercise: yes. Has the patient ever been transfused or tattooed?: yes. The patient reports that there is not domestic violence in her life.  Pt stopped using OCP as she was having side-effects (breakthrough bleeding, breast tenderness/sensitivity) and is no longer sexually active.  Bleeding issues stopped and her periods are regular.  Denies excessive bleeding or cramping.      GYN & OB History  Patient's last menstrual period was 2019.   Date of Last Pap: 2018    OB History    Para Term  AB Living   2 2 2     2   SAB TAB Ectopic Multiple Live Births         0 2      # Outcome Date GA Lbr Dennis/2nd Weight Sex Delivery Anes PTL Lv   2 Term 11/15/17 38w0d  3.225 kg (7 lb 1.8 oz) M Vag-Spont EPI N TOMASA   1 Term 11/30/15 39w2d / 00:26 3.15 kg (6 lb 15.1 oz) F Vag-Spont EPI N TOMASA       Review of Systems  Review of Systems   Constitutional: Negative for activity change, appetite change, chills, fatigue, fever and unexpected weight change.   Respiratory: Negative for shortness of breath.    Cardiovascular: Negative for chest pain, palpitations and leg swelling.   Gastrointestinal: Negative for abdominal pain, bloating, blood in stool, constipation, diarrhea, nausea and vomiting.   Genitourinary: Negative for dysmenorrhea, dyspareunia, dysuria, flank pain, frequency, genital sores, hematuria, menorrhagia, menstrual problem, pelvic pain, urgency, vaginal bleeding, vaginal discharge, vaginal pain, urinary incontinence, postcoital bleeding, vaginal dryness and vaginal odor.    Musculoskeletal: Negative for back pain.   Integumentary:  Negative for breast mass, nipple discharge, breast skin changes and breast tenderness.   Neurological: Negative for syncope and headaches.   Breast: Positive for mastodynia.Negative for asymmetry, lump, mass, nipple discharge, skin changes and tenderness          Objective:    Physical Exam:   Constitutional: She is oriented to person, place, and time. She appears well-developed and well-nourished. No distress.    HENT:   Head: Normocephalic and atraumatic.    Eyes: Pupils are equal, round, and reactive to light. EOM are normal.    Neck: Normal range of motion.    Cardiovascular: Normal rate, regular rhythm and normal heart sounds.     Pulmonary/Chest: Effort normal and breath sounds normal. Right breast exhibits no inverted nipple, no mass, no nipple discharge, no skin change, no tenderness, no bleeding and no swelling. Left breast exhibits no inverted nipple, no mass, no nipple discharge, no skin change, no tenderness, no bleeding and no swelling. Breasts are symmetrical.        Abdominal: Soft. Bowel sounds are normal. She exhibits no distension. There is no tenderness.     Genitourinary: Vagina normal and uterus normal. Pelvic exam was performed with patient supine. There is no rash, tenderness, lesion or injury on the right labia. There is no rash, tenderness, lesion or injury on the left labia. Uterus is not deviated, not enlarged and not tender. Cervix is normal. Right adnexum displays no mass, no tenderness and no fullness. Left adnexum displays no mass, no tenderness and no fullness. No erythema, tenderness or bleeding in the vagina. No foreign body in the vagina. No signs of injury around the vagina. No vaginal discharge found. Cervix exhibits no motion tenderness, no discharge and no friability. Additional cervical findings: pap smear done          Musculoskeletal: Normal range of motion and moves all extremeties. She exhibits no edema or  tenderness.       Neurological: She is alert and oriented to person, place, and time.    Skin: Skin is warm and dry.    Psychiatric: She has a normal mood and affect. Her behavior is normal. Thought content normal.          Pap History:  08/18 - NILM  02/18 - LEEP (MIZTI 3 with neg margins)  01/18 - Colpo MITZI 2-3  05/17 - Colpo (preg) MITZI 2  04/17 - HSIL     Assessment:        1. Well woman exam with routine gynecological exam    2. Screen for STD (sexually transmitted disease)              Plan:      Well woman exam with routine gynecological exam  -     Liquid-based pap smear, screening  -     Pt was counseled on cervical/vaginal screening guidelines and recommendations.  See pap history above.  If today's pap smear result is negative, next pap smear will be due in 6 months.  -     Pt was advised on current breast cancer screening recommendations.  Pt desires to proceed with breast exam today.  -     Follow up with PCP for routine health maintenance needs.  -     Pt was counseled on contraception options.  Pt desires to proceed with abstinence.    Screen for STD (sexually transmitted disease)  -     C. trachomatis/N. gonorrhoeae by AMP DNA      Follow up in about 6 months (around 11/23/2019) for Pap.

## 2019-05-24 LAB
C TRACH DNA SPEC QL NAA+PROBE: NOT DETECTED
N GONORRHOEA DNA SPEC QL NAA+PROBE: NOT DETECTED

## 2019-12-12 ENCOUNTER — TELEPHONE (OUTPATIENT)
Dept: OBSTETRICS AND GYNECOLOGY | Facility: CLINIC | Age: 24
End: 2019-12-12

## 2019-12-12 NOTE — TELEPHONE ENCOUNTER
----- Message from Regi Bennett sent at 12/12/2019  4:53 PM CST -----  Contact: pt  Went to er last bilateral cyst and needs an appointment    Pt can be reached at [phone]

## 2019-12-12 NOTE — TELEPHONE ENCOUNTER
Patient would like to reschedule her appointment that she cancelled for today.  Patient requested Wednesday's only.  I offered to schedule the appointment first available on 01/08 at 10:15am at [facility] location.  She accepted the time, appointment saved for patient.    Can you please schedule patient for 01/08/2019 at [facility] with Dr Forrest 10:15am for 6 month pap smear/ follow up ovarian cyst.

## 2020-01-08 ENCOUNTER — OFFICE VISIT (OUTPATIENT)
Dept: OBSTETRICS AND GYNECOLOGY | Facility: CLINIC | Age: 25
End: 2020-01-08
Payer: MEDICAID

## 2020-01-08 VITALS
HEIGHT: 62 IN | SYSTOLIC BLOOD PRESSURE: 108 MMHG | DIASTOLIC BLOOD PRESSURE: 64 MMHG | WEIGHT: 150.56 LBS | BODY MASS INDEX: 27.7 KG/M2

## 2020-01-08 DIAGNOSIS — Z12.4 PAP SMEAR FOR CERVICAL CANCER SCREENING: Primary | ICD-10-CM

## 2020-01-08 DIAGNOSIS — N83.201 RIGHT OVARIAN CYST: ICD-10-CM

## 2020-01-08 PROCEDURE — 99213 OFFICE O/P EST LOW 20 MIN: CPT | Mod: PBBFAC | Performed by: OBSTETRICS & GYNECOLOGY

## 2020-01-08 PROCEDURE — 99213 OFFICE O/P EST LOW 20 MIN: CPT | Mod: S$PBB,,, | Performed by: OBSTETRICS & GYNECOLOGY

## 2020-01-08 PROCEDURE — 99999 PR PBB SHADOW E&M-EST. PATIENT-LVL III: CPT | Mod: PBBFAC,,, | Performed by: OBSTETRICS & GYNECOLOGY

## 2020-01-08 PROCEDURE — 99213 PR OFFICE/OUTPT VISIT, EST, LEVL III, 20-29 MIN: ICD-10-PCS | Mod: S$PBB,,, | Performed by: OBSTETRICS & GYNECOLOGY

## 2020-01-08 PROCEDURE — 81025 URINE PREGNANCY TEST: CPT | Mod: PBBFAC | Performed by: OBSTETRICS & GYNECOLOGY

## 2020-01-08 PROCEDURE — 88175 CYTOPATH C/V AUTO FLUID REDO: CPT

## 2020-01-08 PROCEDURE — 99999 PR PBB SHADOW E&M-EST. PATIENT-LVL III: ICD-10-PCS | Mod: PBBFAC,,, | Performed by: OBSTETRICS & GYNECOLOGY

## 2020-01-08 PROCEDURE — 81002 URINALYSIS NONAUTO W/O SCOPE: CPT | Mod: PBBFAC | Performed by: OBSTETRICS & GYNECOLOGY

## 2020-01-08 RX ORDER — MULTIVITAMIN
1 TABLET ORAL DAILY
COMMUNITY
End: 2020-10-02

## 2020-01-08 NOTE — PROGRESS NOTES
Subjective:       Patient ID: Gayla Montana is a 24 y.o. female.    Chief Complaint:  Gynecologic Exam (repeat pap smear; also c/o bilateral ovarian cysts, seen on u/s in ER at [facility]. )      History of Present Illness  HPI  Pt is here for schedule pap.  Reports she was seen at [facility] ER last month for lower abdominal pains and was told that her pains were caused by bilateral ovarian cysts.  Was advised to follow up wtih her Gyn.  Reports pains have improved but are still present.  menses remain regular.  Pt is sexually active (timing method).  Denies nausea, vomiting, dysuria, fevers, diarrhea, constipation.    GYN & OB History  Patient's last menstrual period was 2019 (exact date).   Date of Last Pap: 2019    OB History    Para Term  AB Living   2 2 2     2   SAB TAB Ectopic Multiple Live Births         0 2      # Outcome Date GA Lbr Dennis/2nd Weight Sex Delivery Anes PTL Lv   2 Term 11/15/17 38w0d  3.225 kg (7 lb 1.8 oz) M Vag-Spont EPI N TOMASA   1 Term 11/30/15 39w2d / 00:26 3.15 kg (6 lb 15.1 oz) F Vag-Spont EPI N TOMASA       Review of Systems  Review of Systems   Constitutional: Negative for activity change, appetite change, chills, fatigue, fever and unexpected weight change.   Respiratory: Negative for shortness of breath.    Cardiovascular: Negative for chest pain, palpitations and leg swelling.   Gastrointestinal: Positive for abdominal pain. Negative for bloating, blood in stool, constipation, diarrhea, nausea and vomiting.   Genitourinary: Negative for dysmenorrhea, dyspareunia, dysuria, flank pain, frequency, genital sores, hematuria, menorrhagia, menstrual problem, pelvic pain, urgency, vaginal bleeding, vaginal discharge, vaginal pain, urinary incontinence, postcoital bleeding, vaginal dryness and vaginal odor.   Musculoskeletal: Negative for back pain.   Neurological: Negative for syncope and headaches.           Objective:    Physical Exam:   Constitutional: She is  oriented to person, place, and time. She appears well-developed and well-nourished. No distress.       Cardiovascular: Normal rate and regular rhythm.     Pulmonary/Chest: Effort normal and breath sounds normal.        Abdominal: Soft. Bowel sounds are normal. She exhibits no distension. There is no tenderness.     Genitourinary: Vagina normal and uterus normal. Pelvic exam was performed with patient supine. There is no rash, tenderness, lesion or injury on the right labia. There is no rash, tenderness, lesion or injury on the left labia. Uterus is not deviated, not enlarged and not tender. Cervix is normal. Right adnexum displays no mass, no tenderness and no fullness. Left adnexum displays no mass, no tenderness and no fullness. No erythema, tenderness or bleeding in the vagina. No foreign body in the vagina. No signs of injury around the vagina. No vaginal discharge found. Cervix exhibits no motion tenderness, no discharge and no friability. Additional cervical findings: pap smear done  Genitourinary Comments: UPT today Negative  UA today Negative           Musculoskeletal: Normal range of motion and moves all extremeties. She exhibits no edema or tenderness.       Neurological: She is alert and oriented to person, place, and time.    Skin: Skin is warm and dry.    Psychiatric: She has a normal mood and affect. Her behavior is normal. Thought content normal.          US 12/11/2019:  The uterus is normal in size and echogenicity. Both ovaries appear normal in size. There are bilateral follicular cyst within the ovaries measuring up to 1.8 cm in diameter on the right. Normal blood flow demonstrated to both ovaries. There is no fluid in the cul-de-sac.  Impression: Bilateral follicular cysts.    Pap History:  05/19 - NILM  08/18 - NILM  02/18 - LEEP (MITZI 3 with neg margins)  01/18 - Colpo MITZI 2-3  05/17 - Colpo (preg) MITZI 2  04/17 - HSIL       Assessment:        1. Pap smear for cervical cancer screening    2. Right  ovarian cyst             Plan:      Pap smear for cervical cancer screening  -     Liquid-Based Pap Smear, Screening  -     Pt was counseled on cervical/vaginal screening guidelines and recommendations.  See pap history above.  If today's pap smear result is negative, next pap smear will be due in 6 months.  -     Follow up with PCP for routine health maintenance needs.    Right ovarian cyst  -     POCT URINE DIPSTICK WITHOUT MICROSCOPE  -     POCT urine pregnancy  -     Clinical presentation of pain is non-specific.  Pt was counseled on ovarian cysts.  Including that they are a common and often times asymptomatic finding that normally resolve spontaneously and without intervention.  Common symptoms associated with cysts and treatment/prevention options were discussed.  Current cysts noted on recent ultrasound are small and are not likely to cause her presenting symptoms.  They are likely to resolve spontaneously.  If symptoms consistent with ovarian cyst related pain were to occur in the future, would advise repeat ultrasound in 6 weeks.  If pt remains asymptomatic, would advise no further interventions.  Treatment options to prevent future cyst were covered, including recommendation to initiate hormonal contraception.  Pt again voiced understanding and desires to proceed with expectant management.        Follow up in about 6 months (around 7/8/2020) for Annual with pap.

## 2020-01-24 LAB
FINAL PATHOLOGIC DIAGNOSIS: NORMAL
Lab: NORMAL

## 2020-07-29 ENCOUNTER — TELEPHONE (OUTPATIENT)
Dept: OBSTETRICS AND GYNECOLOGY | Facility: CLINIC | Age: 25
End: 2020-07-29

## 2020-07-29 NOTE — TELEPHONE ENCOUNTER
----- Message from Alvaro Garcia sent at 7/29/2020 11:16 AM CDT -----  Regarding: pt  Pt stated she went in to the hospital , and tested positive for covid 19 ; was told er department that she needs to be seen sooner . Please call back at .[phone] .        Thank You,   Alvaro Garcia

## 2020-08-12 ENCOUNTER — LAB VISIT (OUTPATIENT)
Dept: LAB | Facility: HOSPITAL | Age: 25
End: 2020-08-12
Attending: MIDWIFE
Payer: MEDICAID

## 2020-08-12 ENCOUNTER — PROCEDURE VISIT (OUTPATIENT)
Dept: OBSTETRICS AND GYNECOLOGY | Facility: CLINIC | Age: 25
End: 2020-08-12
Payer: MEDICAID

## 2020-08-12 ENCOUNTER — OFFICE VISIT (OUTPATIENT)
Dept: OBSTETRICS AND GYNECOLOGY | Facility: CLINIC | Age: 25
End: 2020-08-12
Payer: MEDICAID

## 2020-08-12 VITALS
SYSTOLIC BLOOD PRESSURE: 116 MMHG | WEIGHT: 140.63 LBS | BODY MASS INDEX: 25.88 KG/M2 | DIASTOLIC BLOOD PRESSURE: 78 MMHG | HEIGHT: 62 IN

## 2020-08-12 DIAGNOSIS — Z32.00 POSSIBLE PREGNANCY: ICD-10-CM

## 2020-08-12 DIAGNOSIS — O21.9 NAUSEA AND VOMITING IN PREGNANCY: ICD-10-CM

## 2020-08-12 DIAGNOSIS — O26.841 UTERINE SIZE-DATE DISCREPANCY IN FIRST TRIMESTER: ICD-10-CM

## 2020-08-12 DIAGNOSIS — N91.2 AMENORRHEA: ICD-10-CM

## 2020-08-12 DIAGNOSIS — Z32.00 POSSIBLE PREGNANCY: Primary | ICD-10-CM

## 2020-08-12 PROBLEM — O99.019 ANEMIA OF MOTHER IN PREGNANCY, ANTEPARTUM: Status: RESOLVED | Noted: 2017-09-21 | Resolved: 2020-08-12

## 2020-08-12 LAB
B-HCG UR QL: POSITIVE
BASOPHILS # BLD AUTO: 0.02 K/UL (ref 0–0.2)
BASOPHILS NFR BLD: 0.2 % (ref 0–1.9)
CTP QC/QA: YES
DIFFERENTIAL METHOD: ABNORMAL
EOSINOPHIL # BLD AUTO: 0 K/UL (ref 0–0.5)
EOSINOPHIL NFR BLD: 0.4 % (ref 0–8)
ERYTHROCYTE [DISTWIDTH] IN BLOOD BY AUTOMATED COUNT: 11.7 % (ref 11.5–14.5)
HCT VFR BLD AUTO: 38.3 % (ref 37–48.5)
HGB BLD-MCNC: 12.7 G/DL (ref 12–16)
IMM GRANULOCYTES # BLD AUTO: 0.02 K/UL (ref 0–0.04)
IMM GRANULOCYTES NFR BLD AUTO: 0.2 % (ref 0–0.5)
LYMPHOCYTES # BLD AUTO: 1.6 K/UL (ref 1–4.8)
LYMPHOCYTES NFR BLD: 19.4 % (ref 18–48)
MCH RBC QN AUTO: 30.3 PG (ref 27–31)
MCHC RBC AUTO-ENTMCNC: 33.2 G/DL (ref 32–36)
MCV RBC AUTO: 91 FL (ref 82–98)
MONOCYTES # BLD AUTO: 0.5 K/UL (ref 0.3–1)
MONOCYTES NFR BLD: 5.6 % (ref 4–15)
NEUTROPHILS # BLD AUTO: 6.2 K/UL (ref 1.8–7.7)
NEUTROPHILS NFR BLD: 74.2 % (ref 38–73)
NRBC BLD-RTO: 0 /100 WBC
PLATELET # BLD AUTO: 220 K/UL (ref 150–350)
PMV BLD AUTO: 12 FL (ref 9.2–12.9)
RBC # BLD AUTO: 4.19 M/UL (ref 4–5.4)
WBC # BLD AUTO: 8.39 K/UL (ref 3.9–12.7)

## 2020-08-12 PROCEDURE — 76801 OB US < 14 WKS SINGLE FETUS: CPT | Mod: 26,S$PBB,, | Performed by: OBSTETRICS & GYNECOLOGY

## 2020-08-12 PROCEDURE — 80074 ACUTE HEPATITIS PANEL: CPT

## 2020-08-12 PROCEDURE — 86850 RBC ANTIBODY SCREEN: CPT

## 2020-08-12 PROCEDURE — 99999 PR PBB SHADOW E&M-EST. PATIENT-LVL III: CPT | Mod: PBBFAC,,, | Performed by: MIDWIFE

## 2020-08-12 PROCEDURE — 99213 PR OFFICE/OUTPT VISIT, EST, LEVL III, 20-29 MIN: ICD-10-PCS | Mod: S$PBB,TH,, | Performed by: MIDWIFE

## 2020-08-12 PROCEDURE — 99999 PR PBB SHADOW E&M-EST. PATIENT-LVL III: ICD-10-PCS | Mod: PBBFAC,,, | Performed by: MIDWIFE

## 2020-08-12 PROCEDURE — 85025 COMPLETE CBC W/AUTO DIFF WBC: CPT

## 2020-08-12 PROCEDURE — 83020 HEMOGLOBIN ELECTROPHORESIS: CPT

## 2020-08-12 PROCEDURE — 87086 URINE CULTURE/COLONY COUNT: CPT

## 2020-08-12 PROCEDURE — 76801 OB US < 14 WKS SINGLE FETUS: CPT | Mod: PBBFAC | Performed by: OBSTETRICS & GYNECOLOGY

## 2020-08-12 PROCEDURE — 99213 OFFICE O/P EST LOW 20 MIN: CPT | Mod: S$PBB,TH,, | Performed by: MIDWIFE

## 2020-08-12 PROCEDURE — 76801 PR US, OB <14WKS, TRANSABD, SINGLE GESTATION: ICD-10-PCS | Mod: 26,S$PBB,, | Performed by: OBSTETRICS & GYNECOLOGY

## 2020-08-12 PROCEDURE — 86703 HIV-1/HIV-2 1 RESULT ANTBDY: CPT

## 2020-08-12 PROCEDURE — 86762 RUBELLA ANTIBODY: CPT

## 2020-08-12 PROCEDURE — 86592 SYPHILIS TEST NON-TREP QUAL: CPT

## 2020-08-12 PROCEDURE — 99213 OFFICE O/P EST LOW 20 MIN: CPT | Mod: PBBFAC,TH,25 | Performed by: MIDWIFE

## 2020-08-12 PROCEDURE — 87491 CHLMYD TRACH DNA AMP PROBE: CPT

## 2020-08-12 PROCEDURE — 81025 URINE PREGNANCY TEST: CPT | Mod: PBBFAC | Performed by: MIDWIFE

## 2020-08-12 RX ORDER — ONDANSETRON 8 MG/1
TABLET, ORALLY DISINTEGRATING ORAL
COMMUNITY
Start: 2020-08-03 | End: 2020-08-12 | Stop reason: SDUPTHER

## 2020-08-12 RX ORDER — PROMETHAZINE HYDROCHLORIDE 25 MG/1
SUPPOSITORY RECTAL
COMMUNITY
Start: 2020-08-03 | End: 2020-10-02

## 2020-08-12 RX ORDER — ONDANSETRON 8 MG/1
TABLET, ORALLY DISINTEGRATING ORAL
Qty: 25 TABLET | Refills: 1 | Status: SHIPPED | OUTPATIENT
Start: 2020-08-12 | End: 2020-10-02

## 2020-08-12 RX ORDER — ALBUTEROL SULFATE 90 UG/1
2 AEROSOL, METERED RESPIRATORY (INHALATION) EVERY 4 HOURS PRN
COMMUNITY
Start: 2020-07-27 | End: 2020-10-02

## 2020-08-12 NOTE — PROGRESS NOTES
Gayla Montana  complains of amenorrhea.  Patient's last menstrual period was 06/15/2020 (approximate).. UPT is Positive.  She is taking a PNV.  She reports nausea/vomiting.    Past Medical History:   Diagnosis Date    Abnormal Pap smear of cervix     MITZI 2    Headache(784.0)     Hemorrhoids     Inflammatory bowel disease      Past Surgical History:   Procedure Laterality Date    ADENOIDECTOMY      APPENDECTOMY      CERVICAL BIOPSY  W/ LOOP ELECTRODE EXCISION      FOOT SURGERY      TONSILLECTOMY       Family History   Problem Relation Age of Onset    Diabetes Mother     Ovarian cancer Mother     Diabetes Father     No Known Problems Brother     No Known Problems Sister     Multiple sclerosis Maternal Aunt     Ovarian cancer Maternal Aunt     Breast cancer Neg Hx     Colon cancer Neg Hx     Thrombosis Neg Hx      Review of patient's allergies indicates:   Allergen Reactions    Latex, natural rubber Swelling and Rash     Social History     Socioeconomic History    Marital status: Single     Spouse name: Not on file    Number of children: Not on file    Years of education: Not on file    Highest education level: Not on file   Occupational History    Not on file   Social Needs    Financial resource strain: Not on file    Food insecurity     Worry: Not on file     Inability: Not on file    Transportation needs     Medical: Not on file     Non-medical: Not on file   Tobacco Use    Smoking status: Never Smoker    Smokeless tobacco: Never Used   Substance and Sexual Activity    Alcohol use: Yes     Alcohol/week: 1.0 standard drinks     Types: 1 Glasses of wine per week     Comment: socially    Drug use: No    Sexual activity: Yes     Partners: Male     Birth control/protection: None   Lifestyle    Physical activity     Days per week: Not on file     Minutes per session: Not on file    Stress: Not on file   Relationships    Social connections     Talks on phone: Not on file      Gets together: Not on file     Attends Jainism service: Not on file     Active member of club or organization: Not on file     Attends meetings of clubs or organizations: Not on file     Relationship status: Not on file   Other Topics Concern    Not on file   Social History Narrative    Not on file       ROS:  GENERAL: No fever, chills, fatigability or weight loss.  VULVAR: No pain, no lesions and no itching.  VAGINAL: No relaxation, no itching, no discharge, no abnormal bleeding and no lesions.  ABDOMEN: No abdominal pain. Denies nausea. Denies vomiting. No diarrhea. No constipation  BREAST: Denies pain. No lumps. No discharge.  URINARY: No incontinence, no nocturia, no frequency and no dysuria.  CARDIOVASCULAR: No chest pain. No shortness of breath. No leg cramps.  NEUROLOGICAL: no headaches. No vision changes.      Vitals:    08/12/20 1452   BP: 116/78     GENERAL: healthy, alert, no distress, cooperative  GENITALIA: normal external genitalia, no erythema, no discharge  URETHRA: normal appearing urethra with no masses, tenderness or lesions  VAGINA: normal vagina, no discharge, exudate, lesion, or erythema  CERVIX: no CMT  UTERUS: size consistent with 8 weeks  Dating US:    SIUP 8w 0d gestation, JOSE 3/24/2021      Gayla was seen today for possible pregnancy.    Diagnoses and all orders for this visit:    Possible pregnancy  -     POCT urine pregnancy  -     Rubella Antibody, IgG; Future  -     HIV 1/2 Ag/Ab (4th Gen); Future  -     RPR; Future  -     Hemoglobin Electrophoresis,Hgb A2 Trell.; Future  -     C. trachomatis/N. gonorrhoeae by AMP DNA  -     Type & Screen - Ob Profile; Future  -     CBC auto differential; Future  -     Urine culture  -     Hepatitis Panel, Acute; Future    Nausea and vomiting in pregnancy  -     ondansetron (ZOFRAN-ODT) 8 MG TbDL; DIS ONE T PO Q 8 H PRN N    Uterine size-date discrepancy in first trimester  -     US OB/GYN Procedure ([facility]);  Future    Amenorrhea

## 2020-08-13 LAB
ABO + RH BLD: NORMAL
BLD GP AB SCN CELLS X3 SERPL QL: NORMAL
HAV IGM SERPL QL IA: NEGATIVE
HBV CORE IGM SERPL QL IA: NEGATIVE
HBV SURFACE AG SERPL QL IA: NEGATIVE
HCV AB SERPL QL IA: NEGATIVE
HGB A2 MFR BLD HPLC: 3 % (ref 2.2–3.2)
HGB FRACT BLD ELPH-IMP: NORMAL
HGB FRACT BLD ELPH-IMP: NORMAL
HIV 1+2 AB+HIV1 P24 AG SERPL QL IA: NEGATIVE
RPR SER QL: NORMAL
RUBV IGG SER-ACNC: 41.2 IU/ML
RUBV IGG SER-IMP: REACTIVE

## 2020-08-14 LAB — BACTERIA UR CULT: NO GROWTH

## 2020-08-31 LAB
C TRACH DNA SPEC QL NAA+PROBE: NOT DETECTED
N GONORRHOEA DNA SPEC QL NAA+PROBE: NOT DETECTED

## 2020-09-01 ENCOUNTER — INITIAL PRENATAL (OUTPATIENT)
Dept: OBSTETRICS AND GYNECOLOGY | Facility: CLINIC | Age: 25
End: 2020-09-01
Payer: MEDICAID

## 2020-09-01 VITALS
DIASTOLIC BLOOD PRESSURE: 70 MMHG | BODY MASS INDEX: 25.73 KG/M2 | WEIGHT: 140.63 LBS | SYSTOLIC BLOOD PRESSURE: 120 MMHG

## 2020-09-01 DIAGNOSIS — Z3A.10 10 WEEKS GESTATION OF PREGNANCY: ICD-10-CM

## 2020-09-01 DIAGNOSIS — Z34.81 ENCOUNTER FOR SUPERVISION OF OTHER NORMAL PREGNANCY, FIRST TRIMESTER: Primary | ICD-10-CM

## 2020-09-01 DIAGNOSIS — U07.1 COVID-19: ICD-10-CM

## 2020-09-01 PROBLEM — N89.8 CLEAR VAGINAL DISCHARGE: Status: RESOLVED | Noted: 2017-11-13 | Resolved: 2020-09-01

## 2020-09-01 PROCEDURE — 99999 PR PBB SHADOW E&M-EST. PATIENT-LVL III: CPT | Mod: PBBFAC,,, | Performed by: MIDWIFE

## 2020-09-01 PROCEDURE — 99213 OFFICE O/P EST LOW 20 MIN: CPT | Mod: PBBFAC,TH | Performed by: MIDWIFE

## 2020-09-01 PROCEDURE — 99999 PR PBB SHADOW E&M-EST. PATIENT-LVL III: ICD-10-PCS | Mod: PBBFAC,,, | Performed by: MIDWIFE

## 2020-09-01 PROCEDURE — 99213 OFFICE O/P EST LOW 20 MIN: CPT | Mod: TH,S$PBB,, | Performed by: MIDWIFE

## 2020-09-01 PROCEDURE — 99213 PR OFFICE/OUTPT VISIT, EST, LEVL III, 20-29 MIN: ICD-10-PCS | Mod: TH,S$PBB,, | Performed by: MIDWIFE

## 2020-09-01 NOTE — PROGRESS NOTES
Subjective:      Gayla Montana is being seen today for her first obstetrical visit.  This is a planned pregnancy. She is at 10w6d gestation. Her obstetrical history is significant for none. Relationship with FOB: significant other, living together. Patient does intend to breast feed. Pregnancy history fully reviewed. Pt was diagnosed w/ COVID 19 at [facility] on 20.    Menstrual History:  OB History        3    Para   2    Term   2            AB        Living   2       SAB        TAB        Ectopic        Multiple   0    Live Births   2                  Patient's last menstrual period was 06/15/2020 (approximate).       The following portions of the patient's history were reviewed and updated as appropriate: allergies, current medications, past family history, past medical history, past social history, past surgical history and problem list.    Review of Systems  Pertinent items are noted in HPI.      Objective:      General appearance: alert, appears stated age, cooperative and no distress      Assessment:      Pregnancy at 10 and 6/7 weeks      Plan:      Initial labs reviewed  Prenatal vitamins.  Problem list reviewed and updated.  Follow up in 4 weeks.  100% of 20 min visit spent on counseling and coordination of care.    n/a

## 2020-10-02 ENCOUNTER — ROUTINE PRENATAL (OUTPATIENT)
Dept: OBSTETRICS AND GYNECOLOGY | Facility: CLINIC | Age: 25
End: 2020-10-02
Payer: MEDICAID

## 2020-10-02 VITALS
WEIGHT: 145.31 LBS | SYSTOLIC BLOOD PRESSURE: 112 MMHG | DIASTOLIC BLOOD PRESSURE: 70 MMHG | BODY MASS INDEX: 26.57 KG/M2

## 2020-10-02 DIAGNOSIS — Z34.82 ENCOUNTER FOR SUPERVISION OF OTHER NORMAL PREGNANCY, SECOND TRIMESTER: Primary | ICD-10-CM

## 2020-10-02 DIAGNOSIS — Z36.3 ANTENATAL SCREENING FOR MALFORMATION USING ULTRASONICS: ICD-10-CM

## 2020-10-02 DIAGNOSIS — Z3A.15 15 WEEKS GESTATION OF PREGNANCY: ICD-10-CM

## 2020-10-02 PROCEDURE — 99999 PR PBB SHADOW E&M-EST. PATIENT-LVL III: ICD-10-PCS | Mod: PBBFAC,,, | Performed by: MIDWIFE

## 2020-10-02 PROCEDURE — 99213 OFFICE O/P EST LOW 20 MIN: CPT | Mod: PBBFAC,TH | Performed by: MIDWIFE

## 2020-10-02 PROCEDURE — 99213 OFFICE O/P EST LOW 20 MIN: CPT | Mod: TH,S$PBB,, | Performed by: MIDWIFE

## 2020-10-02 PROCEDURE — 99213 PR OFFICE/OUTPT VISIT, EST, LEVL III, 20-29 MIN: ICD-10-PCS | Mod: TH,S$PBB,, | Performed by: MIDWIFE

## 2020-10-02 PROCEDURE — 99999 PR PBB SHADOW E&M-EST. PATIENT-LVL III: CPT | Mod: PBBFAC,,, | Performed by: MIDWIFE

## 2020-10-02 NOTE — PROGRESS NOTES
25 y.o. female  at 15w2d   Not feeling flutters/FM, denies VB, LOF or cramping  Doing well without concerns   TW lbs   Genetic testing desires quad screen  Anatomy US ordered  Reviewed warning signs, pregnancy precautions and how/when to call.  RTC x 4 wks, call or present sooner prn.

## 2020-10-09 ENCOUNTER — PATIENT MESSAGE (OUTPATIENT)
Dept: OBSTETRICS AND GYNECOLOGY | Facility: CLINIC | Age: 25
End: 2020-10-09

## 2020-10-27 ENCOUNTER — PATIENT MESSAGE (OUTPATIENT)
Dept: OBSTETRICS AND GYNECOLOGY | Facility: CLINIC | Age: 25
End: 2020-10-27

## 2020-10-30 ENCOUNTER — PROCEDURE VISIT (OUTPATIENT)
Dept: OBSTETRICS AND GYNECOLOGY | Facility: CLINIC | Age: 25
End: 2020-10-30
Payer: MEDICAID

## 2020-10-30 ENCOUNTER — LAB VISIT (OUTPATIENT)
Dept: LAB | Facility: HOSPITAL | Age: 25
End: 2020-10-30
Attending: MIDWIFE
Payer: MEDICAID

## 2020-10-30 ENCOUNTER — ROUTINE PRENATAL (OUTPATIENT)
Dept: OBSTETRICS AND GYNECOLOGY | Facility: CLINIC | Age: 25
End: 2020-10-30
Payer: MEDICAID

## 2020-10-30 VITALS — BODY MASS INDEX: 27.82 KG/M2 | WEIGHT: 152.13 LBS | SYSTOLIC BLOOD PRESSURE: 90 MMHG | DIASTOLIC BLOOD PRESSURE: 70 MMHG

## 2020-10-30 DIAGNOSIS — Z3A.19 19 WEEKS GESTATION OF PREGNANCY: ICD-10-CM

## 2020-10-30 DIAGNOSIS — Z34.82 ENCOUNTER FOR SUPERVISION OF OTHER NORMAL PREGNANCY, SECOND TRIMESTER: ICD-10-CM

## 2020-10-30 DIAGNOSIS — Z36.3 ANTENATAL SCREENING FOR MALFORMATION USING ULTRASONICS: ICD-10-CM

## 2020-10-30 DIAGNOSIS — Z34.82 ENCOUNTER FOR SUPERVISION OF OTHER NORMAL PREGNANCY, SECOND TRIMESTER: Primary | ICD-10-CM

## 2020-10-30 PROCEDURE — 76805 OB US >/= 14 WKS SNGL FETUS: CPT | Mod: 26,S$PBB,, | Performed by: OBSTETRICS & GYNECOLOGY

## 2020-10-30 PROCEDURE — 76805 OB US >/= 14 WKS SNGL FETUS: CPT | Mod: PBBFAC | Performed by: OBSTETRICS & GYNECOLOGY

## 2020-10-30 PROCEDURE — 36415 COLL VENOUS BLD VENIPUNCTURE: CPT

## 2020-10-30 PROCEDURE — 99999 PR PBB SHADOW E&M-EST. PATIENT-LVL III: CPT | Mod: PBBFAC,,, | Performed by: MIDWIFE

## 2020-10-30 PROCEDURE — 76805 PR US, OB 14+WKS, TRANSABD, SINGLE GESTATION: ICD-10-PCS | Mod: 26,S$PBB,, | Performed by: OBSTETRICS & GYNECOLOGY

## 2020-10-30 PROCEDURE — 99213 OFFICE O/P EST LOW 20 MIN: CPT | Mod: TH,S$PBB,, | Performed by: MIDWIFE

## 2020-10-30 PROCEDURE — 81511 FTL CGEN ABNOR FOUR ANAL: CPT

## 2020-10-30 PROCEDURE — 99213 OFFICE O/P EST LOW 20 MIN: CPT | Mod: PBBFAC,TH | Performed by: MIDWIFE

## 2020-10-30 PROCEDURE — 99999 PR PBB SHADOW E&M-EST. PATIENT-LVL III: ICD-10-PCS | Mod: PBBFAC,,, | Performed by: MIDWIFE

## 2020-10-30 PROCEDURE — 99213 PR OFFICE/OUTPT VISIT, EST, LEVL III, 20-29 MIN: ICD-10-PCS | Mod: TH,S$PBB,, | Performed by: MIDWIFE

## 2020-10-30 NOTE — PROGRESS NOTES
25 y.o. female  at 19w2d   Starting to feel flutters/FM, denies VB, LOF or cramping  Doing well without concerns   TW lbs   Reviewed anatomy US, sub opt views, female, will repeat at [facility] in 4 wks  Genetic testing quad screen today  Reviewed warning signs, normal FM,  labor precautions and how/when to call.  RTC x 4 wks, call or present sooner prn.

## 2020-11-02 LAB
# FETUSES US: NORMAL
2ND TRIMESTER 4 SCREEN PNL SERPL: NEGATIVE
2ND TRIMESTER 4 SCREEN SERPL-IMP: NORMAL
AFP MOM SERPL: 0.68
AFP SERPL-MCNC: 35.1 NG/ML
AGE AT DELIVERY: 25
B-HCG MOM SERPL: 1.95
B-HCG SERPL-ACNC: 42.6 IU/ML
FET TS 21 RISK FROM MAT AGE: NORMAL
GA (DAYS): 2 D
GA (WEEKS): 19 WK
GA METHOD: NORMAL
IDDM PATIENT QL: NORMAL
INHIBIN A MOM SERPL: 1.06
INHIBIN A SERPL-MCNC: 164.2 PG/ML
SMOKING STATUS FTND: NO
TS 18 RISK FETUS: NORMAL
TS 21 RISK FETUS: NORMAL
U ESTRIOL MOM SERPL: 0.63
U ESTRIOL SERPL-MCNC: 1.32 NG/ML

## 2020-11-30 ENCOUNTER — ROUTINE PRENATAL (OUTPATIENT)
Dept: OBSTETRICS AND GYNECOLOGY | Facility: CLINIC | Age: 25
End: 2020-11-30
Payer: MEDICAID

## 2020-11-30 VITALS
WEIGHT: 160.25 LBS | SYSTOLIC BLOOD PRESSURE: 118 MMHG | BODY MASS INDEX: 29.31 KG/M2 | DIASTOLIC BLOOD PRESSURE: 74 MMHG

## 2020-11-30 DIAGNOSIS — Z86.16 HISTORY OF 2019 NOVEL CORONAVIRUS DISEASE (COVID-19): ICD-10-CM

## 2020-11-30 DIAGNOSIS — Z3A.23 23 WEEKS GESTATION OF PREGNANCY: ICD-10-CM

## 2020-11-30 DIAGNOSIS — Z34.82 ENCOUNTER FOR SUPERVISION OF OTHER NORMAL PREGNANCY, SECOND TRIMESTER: Primary | ICD-10-CM

## 2020-11-30 DIAGNOSIS — N94.89 UTERINE CRAMPING: ICD-10-CM

## 2020-11-30 LAB — FIBRONECTIN FETAL SPEC QL: NEGATIVE

## 2020-11-30 PROCEDURE — 82731 ASSAY OF FETAL FIBRONECTIN: CPT

## 2020-11-30 PROCEDURE — 99999 PR PBB SHADOW E&M-EST. PATIENT-LVL II: CPT | Mod: PBBFAC,,, | Performed by: MIDWIFE

## 2020-11-30 PROCEDURE — 99212 OFFICE O/P EST SF 10 MIN: CPT | Mod: PBBFAC,TH | Performed by: MIDWIFE

## 2020-11-30 PROCEDURE — 99213 PR OFFICE/OUTPT VISIT, EST, LEVL III, 20-29 MIN: ICD-10-PCS | Mod: TH,S$PBB,, | Performed by: MIDWIFE

## 2020-11-30 PROCEDURE — 99213 OFFICE O/P EST LOW 20 MIN: CPT | Mod: TH,S$PBB,, | Performed by: MIDWIFE

## 2020-11-30 PROCEDURE — 99999 PR PBB SHADOW E&M-EST. PATIENT-LVL II: ICD-10-PCS | Mod: PBBFAC,,, | Performed by: MIDWIFE

## 2020-11-30 NOTE — PROGRESS NOTES
25 y.o. female  at 23w5d   Reports + FM, denies VB, LOF, or cramping  Doing well , some cramping and pressure over the weekend, denies intercourse, FFN collected, VE done, cervix closed/50%/unable to determine presenting part   TW lbs   Reviewed upcoming 28wk labs, (A POS) and orders placed, tdap handout provided and explained  Reviewed warning signs, normal FM,  labor precautions and how/when to call.  RTC x 4 wks, call or present sooner prn.

## 2020-12-29 ENCOUNTER — LAB VISIT (OUTPATIENT)
Dept: LAB | Facility: HOSPITAL | Age: 25
End: 2020-12-29
Attending: MIDWIFE
Payer: MEDICAID

## 2020-12-29 ENCOUNTER — PROCEDURE VISIT (OUTPATIENT)
Dept: OBSTETRICS AND GYNECOLOGY | Facility: CLINIC | Age: 25
End: 2020-12-29
Payer: MEDICAID

## 2020-12-29 ENCOUNTER — ROUTINE PRENATAL (OUTPATIENT)
Dept: OBSTETRICS AND GYNECOLOGY | Facility: CLINIC | Age: 25
End: 2020-12-29
Payer: MEDICAID

## 2020-12-29 VITALS
WEIGHT: 157.88 LBS | SYSTOLIC BLOOD PRESSURE: 110 MMHG | BODY MASS INDEX: 28.87 KG/M2 | DIASTOLIC BLOOD PRESSURE: 70 MMHG

## 2020-12-29 DIAGNOSIS — Z34.82 ENCOUNTER FOR SUPERVISION OF OTHER NORMAL PREGNANCY, SECOND TRIMESTER: ICD-10-CM

## 2020-12-29 DIAGNOSIS — Z34.82 ENCOUNTER FOR SUPERVISION OF OTHER NORMAL PREGNANCY, SECOND TRIMESTER: Primary | ICD-10-CM

## 2020-12-29 DIAGNOSIS — O36.5931 SMALL FOR GESTATIONAL AGE FETUS AFFECTING MANAGEMENT OF MOTHER, THIRD TRIMESTER, FETUS 1: ICD-10-CM

## 2020-12-29 DIAGNOSIS — Z86.16 HISTORY OF 2019 NOVEL CORONAVIRUS DISEASE (COVID-19): ICD-10-CM

## 2020-12-29 DIAGNOSIS — O21.9 NAUSEA AND VOMITING IN PREGNANCY: ICD-10-CM

## 2020-12-29 DIAGNOSIS — Z3A.27 27 WEEKS GESTATION OF PREGNANCY: ICD-10-CM

## 2020-12-29 LAB
BASOPHILS # BLD AUTO: 0.02 K/UL (ref 0–0.2)
BASOPHILS NFR BLD: 0.2 % (ref 0–1.9)
DIFFERENTIAL METHOD: ABNORMAL
EOSINOPHIL # BLD AUTO: 0 K/UL (ref 0–0.5)
EOSINOPHIL NFR BLD: 0.3 % (ref 0–8)
ERYTHROCYTE [DISTWIDTH] IN BLOOD BY AUTOMATED COUNT: 12.6 % (ref 11.5–14.5)
GLUCOSE SERPL-MCNC: 165 MG/DL (ref 70–140)
HCT VFR BLD AUTO: 34.2 % (ref 37–48.5)
HGB BLD-MCNC: 11 G/DL (ref 12–16)
IMM GRANULOCYTES # BLD AUTO: 0.05 K/UL (ref 0–0.04)
IMM GRANULOCYTES NFR BLD AUTO: 0.5 % (ref 0–0.5)
LYMPHOCYTES # BLD AUTO: 1.3 K/UL (ref 1–4.8)
LYMPHOCYTES NFR BLD: 13.8 % (ref 18–48)
MCH RBC QN AUTO: 30.5 PG (ref 27–31)
MCHC RBC AUTO-ENTMCNC: 32.2 G/DL (ref 32–36)
MCV RBC AUTO: 95 FL (ref 82–98)
MONOCYTES # BLD AUTO: 0.3 K/UL (ref 0.3–1)
MONOCYTES NFR BLD: 3.4 % (ref 4–15)
NEUTROPHILS # BLD AUTO: 7.6 K/UL (ref 1.8–7.7)
NEUTROPHILS NFR BLD: 81.8 % (ref 38–73)
NRBC BLD-RTO: 0 /100 WBC
PLATELET # BLD AUTO: 172 K/UL (ref 150–350)
PMV BLD AUTO: 12.3 FL (ref 9.2–12.9)
RBC # BLD AUTO: 3.61 M/UL (ref 4–5.4)
SARS-COV-2 IGG SERPLBLD QL IA.RAPID: NEGATIVE
WBC # BLD AUTO: 9.23 K/UL (ref 3.9–12.7)

## 2020-12-29 PROCEDURE — 76820 PR US, OB DOPPLER, FETAL UMBILICAL ARTERY ECHO: ICD-10-PCS | Mod: 26,S$PBB,, | Performed by: OBSTETRICS & GYNECOLOGY

## 2020-12-29 PROCEDURE — 99999 PR PBB SHADOW E&M-EST. PATIENT-LVL II: ICD-10-PCS | Mod: PBBFAC,,, | Performed by: MIDWIFE

## 2020-12-29 PROCEDURE — 76816 OB US FOLLOW-UP PER FETUS: CPT | Mod: PBBFAC | Performed by: OBSTETRICS & GYNECOLOGY

## 2020-12-29 PROCEDURE — 99213 PR OFFICE/OUTPT VISIT, EST, LEVL III, 20-29 MIN: ICD-10-PCS | Mod: TH,S$PBB,, | Performed by: MIDWIFE

## 2020-12-29 PROCEDURE — 86592 SYPHILIS TEST NON-TREP QUAL: CPT

## 2020-12-29 PROCEDURE — 76820 UMBILICAL ARTERY ECHO: CPT | Mod: 26,S$PBB,, | Performed by: OBSTETRICS & GYNECOLOGY

## 2020-12-29 PROCEDURE — 76816 OB US FOLLOW-UP PER FETUS: CPT | Mod: 26,S$PBB,, | Performed by: OBSTETRICS & GYNECOLOGY

## 2020-12-29 PROCEDURE — 86769 SARS-COV-2 COVID-19 ANTIBODY: CPT

## 2020-12-29 PROCEDURE — 76819 FETAL BIOPHYS PROFIL W/O NST: CPT | Mod: 26,S$PBB,, | Performed by: OBSTETRICS & GYNECOLOGY

## 2020-12-29 PROCEDURE — 99999 PR PBB SHADOW E&M-EST. PATIENT-LVL II: CPT | Mod: PBBFAC,,, | Performed by: MIDWIFE

## 2020-12-29 PROCEDURE — 99212 OFFICE O/P EST SF 10 MIN: CPT | Mod: PBBFAC,TH,25 | Performed by: MIDWIFE

## 2020-12-29 PROCEDURE — 86703 HIV-1/HIV-2 1 RESULT ANTBDY: CPT

## 2020-12-29 PROCEDURE — 76816 PR  US,PREGNANT UTERUS,F/U,TRANSABD APP: ICD-10-PCS | Mod: 26,S$PBB,, | Performed by: OBSTETRICS & GYNECOLOGY

## 2020-12-29 PROCEDURE — 76820 UMBILICAL ARTERY ECHO: CPT | Mod: PBBFAC | Performed by: OBSTETRICS & GYNECOLOGY

## 2020-12-29 PROCEDURE — 76819 PR US, OB, FETAL BIOPHYSICAL, W/O NST: ICD-10-PCS | Mod: 26,S$PBB,, | Performed by: OBSTETRICS & GYNECOLOGY

## 2020-12-29 PROCEDURE — 85025 COMPLETE CBC W/AUTO DIFF WBC: CPT

## 2020-12-29 PROCEDURE — 76819 FETAL BIOPHYS PROFIL W/O NST: CPT | Mod: PBBFAC | Performed by: OBSTETRICS & GYNECOLOGY

## 2020-12-29 PROCEDURE — 36415 COLL VENOUS BLD VENIPUNCTURE: CPT

## 2020-12-29 PROCEDURE — 99213 OFFICE O/P EST LOW 20 MIN: CPT | Mod: TH,S$PBB,, | Performed by: MIDWIFE

## 2020-12-29 PROCEDURE — 82950 GLUCOSE TEST: CPT

## 2020-12-29 RX ORDER — PANTOPRAZOLE SODIUM 40 MG/1
40 TABLET, DELAYED RELEASE ORAL DAILY
Qty: 30 TABLET | Refills: 4 | Status: SHIPPED | OUTPATIENT
Start: 2020-12-29 | End: 2021-02-03

## 2020-12-29 NOTE — PROGRESS NOTES
25 y.o. female  at 27w6d   Reports + FM, denies VB, LOF or CTX  Doing well without concerns   TW lbs   28wk labs today (A POS)  Tdap info givenUS today for sub opt views, spine remains sub opt, EFW 1# 15oz, 17%, AC 3%, check growth in 3-4 wks  Reviewed warning signs, normal FKCs,  labor precautions and how/when to call.  RTC x 1 wks, call or present sooner prn.

## 2020-12-30 LAB
HIV 1+2 AB+HIV1 P24 AG SERPL QL IA: NEGATIVE
RPR SER QL: NORMAL

## 2021-01-08 ENCOUNTER — HOSPITAL ENCOUNTER (OUTPATIENT)
Facility: HOSPITAL | Age: 26
Discharge: HOME | End: 2021-01-08
Attending: OBSTETRICS & GYNECOLOGY | Admitting: OBSTETRICS & GYNECOLOGY
Payer: MEDICAID

## 2021-01-08 ENCOUNTER — TELEPHONE (OUTPATIENT)
Dept: OBSTETRICS AND GYNECOLOGY | Facility: CLINIC | Age: 26
End: 2021-01-08

## 2021-01-08 VITALS
HEIGHT: 62 IN | HEART RATE: 97 BPM | BODY MASS INDEX: 28.89 KG/M2 | SYSTOLIC BLOOD PRESSURE: 115 MMHG | WEIGHT: 157 LBS | OXYGEN SATURATION: 97 % | DIASTOLIC BLOOD PRESSURE: 81 MMHG

## 2021-01-08 DIAGNOSIS — O26.893 VAGINAL DISCHARGE DURING PREGNANCY IN THIRD TRIMESTER: ICD-10-CM

## 2021-01-08 DIAGNOSIS — N89.8 VAGINAL DISCHARGE DURING PREGNANCY IN THIRD TRIMESTER: ICD-10-CM

## 2021-01-08 PROCEDURE — 99213 OFFICE O/P EST LOW 20 MIN: CPT | Mod: TH,25,S$PBB, | Performed by: MIDWIFE

## 2021-01-08 PROCEDURE — G0378 HOSPITAL OBSERVATION PER HR: HCPCS

## 2021-01-08 PROCEDURE — 25000003 PHARM REV CODE 250: Performed by: MIDWIFE

## 2021-01-08 PROCEDURE — 59025 FETAL NON-STRESS TEST: CPT

## 2021-01-08 PROCEDURE — 59025 OBTAIN FETAL NONSTRESS TEST (NST): ICD-10-PCS | Mod: 26,S$PBB,, | Performed by: MIDWIFE

## 2021-01-08 PROCEDURE — 99213 PR OFFICE/OUTPT VISIT, EST, LEVL III, 20-29 MIN: ICD-10-PCS | Mod: TH,25,S$PBB, | Performed by: MIDWIFE

## 2021-01-08 PROCEDURE — 99211 OFF/OP EST MAY X REQ PHY/QHP: CPT | Mod: TH

## 2021-01-08 PROCEDURE — 59025 FETAL NON-STRESS TEST: CPT | Mod: 26,S$PBB,, | Performed by: MIDWIFE

## 2021-01-08 RX ORDER — ACETAMINOPHEN 500 MG
500 TABLET ORAL EVERY 6 HOURS PRN
COMMUNITY
End: 2021-02-03

## 2021-01-08 RX ORDER — METRONIDAZOLE 500 MG/1
500 TABLET ORAL EVERY 12 HOURS
Qty: 14 TABLET | Refills: 0 | Status: SHIPPED | OUTPATIENT
Start: 2021-01-08 | End: 2021-01-15

## 2021-01-08 RX ORDER — ONDANSETRON 8 MG/1
8 TABLET, ORALLY DISINTEGRATING ORAL EVERY 8 HOURS PRN
Status: DISCONTINUED | OUTPATIENT
Start: 2021-01-08 | End: 2021-01-08 | Stop reason: HOSPADM

## 2021-01-08 RX ORDER — ACETAMINOPHEN 500 MG
500 TABLET ORAL EVERY 6 HOURS PRN
Status: DISCONTINUED | OUTPATIENT
Start: 2021-01-08 | End: 2021-01-08 | Stop reason: HOSPADM

## 2021-01-08 RX ORDER — ONDANSETRON 4 MG/1
4 TABLET, ORALLY DISINTEGRATING ORAL ONCE
Status: COMPLETED | OUTPATIENT
Start: 2021-01-08 | End: 2021-01-08

## 2021-01-08 RX ADMIN — ONDANSETRON 4 MG: 4 TABLET, ORALLY DISINTEGRATING ORAL at 01:01

## 2021-01-11 ENCOUNTER — TELEPHONE (OUTPATIENT)
Dept: OBSTETRICS AND GYNECOLOGY | Facility: CLINIC | Age: 26
End: 2021-01-11

## 2021-01-15 ENCOUNTER — ROUTINE PRENATAL (OUTPATIENT)
Dept: OBSTETRICS AND GYNECOLOGY | Facility: CLINIC | Age: 26
End: 2021-01-15
Payer: MEDICAID

## 2021-01-15 VITALS
DIASTOLIC BLOOD PRESSURE: 62 MMHG | BODY MASS INDEX: 28.91 KG/M2 | WEIGHT: 158.06 LBS | SYSTOLIC BLOOD PRESSURE: 112 MMHG

## 2021-01-15 DIAGNOSIS — O99.810 ABNORMAL O'SULLIVAN GLUCOSE CHALLENGE TEST, ANTEPARTUM: Primary | ICD-10-CM

## 2021-01-15 PROCEDURE — 99213 PR OFFICE/OUTPT VISIT, EST, LEVL III, 20-29 MIN: ICD-10-PCS | Mod: TH,S$PBB,, | Performed by: MIDWIFE

## 2021-01-15 PROCEDURE — 99213 OFFICE O/P EST LOW 20 MIN: CPT | Mod: TH,S$PBB,, | Performed by: MIDWIFE

## 2021-01-15 PROCEDURE — 99999 PR PBB SHADOW E&M-EST. PATIENT-LVL II: CPT | Mod: PBBFAC,,, | Performed by: MIDWIFE

## 2021-01-15 PROCEDURE — 99212 OFFICE O/P EST SF 10 MIN: CPT | Mod: PBBFAC,TH | Performed by: MIDWIFE

## 2021-01-15 PROCEDURE — 99999 PR PBB SHADOW E&M-EST. PATIENT-LVL II: ICD-10-PCS | Mod: PBBFAC,,, | Performed by: MIDWIFE

## 2021-01-21 ENCOUNTER — LAB VISIT (OUTPATIENT)
Dept: LAB | Facility: HOSPITAL | Age: 26
End: 2021-01-21
Attending: MIDWIFE
Payer: MEDICAID

## 2021-01-21 DIAGNOSIS — O99.810 ABNORMAL O'SULLIVAN GLUCOSE CHALLENGE TEST, ANTEPARTUM: ICD-10-CM

## 2021-01-21 LAB
GLUCOSE SERPL-MCNC: 185 MG/DL
GLUCOSE SERPL-MCNC: 79 MG/DL (ref 70–110)
GLUCOSE SERPL-MCNC: 96 MG/DL
GLUCOSE SERPL-MCNC: 98 MG/DL

## 2021-01-21 PROCEDURE — 82952 GTT-ADDED SAMPLES: CPT

## 2021-01-21 PROCEDURE — 36415 COLL VENOUS BLD VENIPUNCTURE: CPT

## 2021-02-03 ENCOUNTER — ROUTINE PRENATAL (OUTPATIENT)
Dept: OBSTETRICS AND GYNECOLOGY | Facility: CLINIC | Age: 26
End: 2021-02-03
Payer: MEDICAID

## 2021-02-03 VITALS
SYSTOLIC BLOOD PRESSURE: 112 MMHG | WEIGHT: 159.19 LBS | BODY MASS INDEX: 29.11 KG/M2 | DIASTOLIC BLOOD PRESSURE: 70 MMHG

## 2021-02-03 DIAGNOSIS — Z34.83 ENCOUNTER FOR SUPERVISION OF OTHER NORMAL PREGNANCY, THIRD TRIMESTER: Primary | ICD-10-CM

## 2021-02-03 DIAGNOSIS — Z3A.33 33 WEEKS GESTATION OF PREGNANCY: ICD-10-CM

## 2021-02-03 PROBLEM — O26.893 VAGINAL DISCHARGE DURING PREGNANCY IN THIRD TRIMESTER: Status: RESOLVED | Noted: 2017-11-13 | Resolved: 2021-02-03

## 2021-02-03 PROBLEM — N89.8 VAGINAL DISCHARGE DURING PREGNANCY IN THIRD TRIMESTER: Status: RESOLVED | Noted: 2017-11-13 | Resolved: 2021-02-03

## 2021-02-03 PROCEDURE — 99212 OFFICE O/P EST SF 10 MIN: CPT | Mod: PBBFAC,TH | Performed by: MIDWIFE

## 2021-02-03 PROCEDURE — 99213 OFFICE O/P EST LOW 20 MIN: CPT | Mod: TH,S$PBB,, | Performed by: MIDWIFE

## 2021-02-03 PROCEDURE — 99999 PR PBB SHADOW E&M-EST. PATIENT-LVL II: ICD-10-PCS | Mod: PBBFAC,,, | Performed by: MIDWIFE

## 2021-02-03 PROCEDURE — 99999 PR PBB SHADOW E&M-EST. PATIENT-LVL II: CPT | Mod: PBBFAC,,, | Performed by: MIDWIFE

## 2021-02-03 PROCEDURE — 99213 PR OFFICE/OUTPT VISIT, EST, LEVL III, 20-29 MIN: ICD-10-PCS | Mod: TH,S$PBB,, | Performed by: MIDWIFE

## 2021-02-19 ENCOUNTER — ROUTINE PRENATAL (OUTPATIENT)
Dept: OBSTETRICS AND GYNECOLOGY | Facility: CLINIC | Age: 26
End: 2021-02-19
Payer: MEDICAID

## 2021-02-19 VITALS
SYSTOLIC BLOOD PRESSURE: 118 MMHG | DIASTOLIC BLOOD PRESSURE: 74 MMHG | BODY MASS INDEX: 29.84 KG/M2 | WEIGHT: 163.13 LBS

## 2021-02-19 DIAGNOSIS — Z34.83 ENCOUNTER FOR SUPERVISION OF OTHER NORMAL PREGNANCY, THIRD TRIMESTER: Primary | ICD-10-CM

## 2021-02-19 DIAGNOSIS — Z36.89 ENCOUNTER FOR ULTRASOUND TO ASSESS FETAL GROWTH: ICD-10-CM

## 2021-02-19 DIAGNOSIS — Z3A.35 35 WEEKS GESTATION OF PREGNANCY: ICD-10-CM

## 2021-02-19 DIAGNOSIS — O21.9 NAUSEA AND VOMITING IN PREGNANCY: ICD-10-CM

## 2021-02-19 PROCEDURE — 99213 OFFICE O/P EST LOW 20 MIN: CPT | Mod: TH,S$PBB,, | Performed by: MIDWIFE

## 2021-02-19 PROCEDURE — 99999 PR PBB SHADOW E&M-EST. PATIENT-LVL II: CPT | Mod: PBBFAC,,, | Performed by: MIDWIFE

## 2021-02-19 PROCEDURE — 99213 PR OFFICE/OUTPT VISIT, EST, LEVL III, 20-29 MIN: ICD-10-PCS | Mod: TH,S$PBB,, | Performed by: MIDWIFE

## 2021-02-19 PROCEDURE — 99999 PR PBB SHADOW E&M-EST. PATIENT-LVL II: ICD-10-PCS | Mod: PBBFAC,,, | Performed by: MIDWIFE

## 2021-02-19 PROCEDURE — 99212 OFFICE O/P EST SF 10 MIN: CPT | Mod: PBBFAC,TH | Performed by: MIDWIFE

## 2021-02-19 RX ORDER — ONDANSETRON 4 MG/1
4 TABLET, ORALLY DISINTEGRATING ORAL EVERY 6 HOURS PRN
Qty: 25 TABLET | Refills: 0 | Status: ON HOLD | OUTPATIENT
Start: 2021-02-19 | End: 2021-03-04 | Stop reason: HOSPADM

## 2021-02-26 ENCOUNTER — ROUTINE PRENATAL (OUTPATIENT)
Dept: OBSTETRICS AND GYNECOLOGY | Facility: CLINIC | Age: 26
End: 2021-02-26
Payer: MEDICAID

## 2021-02-26 ENCOUNTER — PROCEDURE VISIT (OUTPATIENT)
Dept: OBSTETRICS AND GYNECOLOGY | Facility: CLINIC | Age: 26
End: 2021-02-26
Payer: MEDICAID

## 2021-02-26 VITALS
BODY MASS INDEX: 29.15 KG/M2 | WEIGHT: 159.38 LBS | SYSTOLIC BLOOD PRESSURE: 118 MMHG | DIASTOLIC BLOOD PRESSURE: 62 MMHG

## 2021-02-26 DIAGNOSIS — O36.5930 IUGR (INTRAUTERINE GROWTH RESTRICTION) AFFECTING CARE OF MOTHER, THIRD TRIMESTER, NOT APPLICABLE OR UNSPECIFIED FETUS: ICD-10-CM

## 2021-02-26 DIAGNOSIS — N87.1 CIN II (CERVICAL INTRAEPITHELIAL NEOPLASIA II): ICD-10-CM

## 2021-02-26 DIAGNOSIS — U07.1 COVID-19: ICD-10-CM

## 2021-02-26 DIAGNOSIS — Z36.89 ENCOUNTER FOR ULTRASOUND TO ASSESS FETAL GROWTH: ICD-10-CM

## 2021-02-26 DIAGNOSIS — Z34.83 ENCOUNTER FOR SUPERVISION OF OTHER NORMAL PREGNANCY, THIRD TRIMESTER: Primary | ICD-10-CM

## 2021-02-26 PROCEDURE — 99999 PR PBB SHADOW E&M-EST. PATIENT-LVL III: CPT | Mod: PBBFAC,,, | Performed by: ADVANCED PRACTICE MIDWIFE

## 2021-02-26 PROCEDURE — 76816 PR  US,PREGNANT UTERUS,F/U,TRANSABD APP: ICD-10-PCS | Mod: 26,S$PBB,, | Performed by: OBSTETRICS & GYNECOLOGY

## 2021-02-26 PROCEDURE — 76819 FETAL BIOPHYS PROFIL W/O NST: CPT | Mod: PBBFAC | Performed by: OBSTETRICS & GYNECOLOGY

## 2021-02-26 PROCEDURE — 76820 PR US, OB DOPPLER, FETAL UMBILICAL ARTERY ECHO: ICD-10-PCS | Mod: 26,S$PBB,, | Performed by: OBSTETRICS & GYNECOLOGY

## 2021-02-26 PROCEDURE — 76816 OB US FOLLOW-UP PER FETUS: CPT | Mod: PBBFAC | Performed by: OBSTETRICS & GYNECOLOGY

## 2021-02-26 PROCEDURE — 99213 OFFICE O/P EST LOW 20 MIN: CPT | Mod: TH,S$PBB,, | Performed by: ADVANCED PRACTICE MIDWIFE

## 2021-02-26 PROCEDURE — 76819 FETAL BIOPHYS PROFIL W/O NST: CPT | Mod: 26,S$PBB,, | Performed by: OBSTETRICS & GYNECOLOGY

## 2021-02-26 PROCEDURE — 87081 CULTURE SCREEN ONLY: CPT

## 2021-02-26 PROCEDURE — 99213 OFFICE O/P EST LOW 20 MIN: CPT | Mod: PBBFAC,25,TH | Performed by: ADVANCED PRACTICE MIDWIFE

## 2021-02-26 PROCEDURE — 99213 PR OFFICE/OUTPT VISIT, EST, LEVL III, 20-29 MIN: ICD-10-PCS | Mod: TH,S$PBB,, | Performed by: ADVANCED PRACTICE MIDWIFE

## 2021-02-26 PROCEDURE — 76820 UMBILICAL ARTERY ECHO: CPT | Mod: 26,S$PBB,, | Performed by: OBSTETRICS & GYNECOLOGY

## 2021-02-26 PROCEDURE — 76816 OB US FOLLOW-UP PER FETUS: CPT | Mod: 26,S$PBB,, | Performed by: OBSTETRICS & GYNECOLOGY

## 2021-02-26 PROCEDURE — 99999 PR PBB SHADOW E&M-EST. PATIENT-LVL III: ICD-10-PCS | Mod: PBBFAC,,, | Performed by: ADVANCED PRACTICE MIDWIFE

## 2021-02-26 PROCEDURE — 76819 PR US, OB, FETAL BIOPHYSICAL, W/O NST: ICD-10-PCS | Mod: 26,S$PBB,, | Performed by: OBSTETRICS & GYNECOLOGY

## 2021-02-26 PROCEDURE — 76820 UMBILICAL ARTERY ECHO: CPT | Mod: PBBFAC | Performed by: OBSTETRICS & GYNECOLOGY

## 2021-03-01 LAB — BACTERIA SPEC AEROBE CULT: NORMAL

## 2021-03-02 ENCOUNTER — ROUTINE PRENATAL (OUTPATIENT)
Dept: OBSTETRICS AND GYNECOLOGY | Facility: CLINIC | Age: 26
End: 2021-03-02
Payer: MEDICAID

## 2021-03-02 VITALS
DIASTOLIC BLOOD PRESSURE: 70 MMHG | SYSTOLIC BLOOD PRESSURE: 118 MMHG | WEIGHT: 163.56 LBS | BODY MASS INDEX: 29.92 KG/M2

## 2021-03-02 DIAGNOSIS — O36.5930 IUGR (INTRAUTERINE GROWTH RESTRICTION) AFFECTING CARE OF MOTHER, THIRD TRIMESTER, NOT APPLICABLE OR UNSPECIFIED FETUS: Primary | ICD-10-CM

## 2021-03-02 DIAGNOSIS — Z3A.36 36 WEEKS GESTATION OF PREGNANCY: ICD-10-CM

## 2021-03-02 PROCEDURE — 99213 PR OFFICE/OUTPT VISIT, EST, LEVL III, 20-29 MIN: ICD-10-PCS | Mod: TH,S$PBB,, | Performed by: MIDWIFE

## 2021-03-02 PROCEDURE — 99213 OFFICE O/P EST LOW 20 MIN: CPT | Mod: TH,S$PBB,, | Performed by: MIDWIFE

## 2021-03-02 PROCEDURE — 99999 PR PBB SHADOW E&M-EST. PATIENT-LVL III: CPT | Mod: PBBFAC,,, | Performed by: MIDWIFE

## 2021-03-02 PROCEDURE — 99213 OFFICE O/P EST LOW 20 MIN: CPT | Mod: PBBFAC,TH | Performed by: MIDWIFE

## 2021-03-02 PROCEDURE — 99999 PR PBB SHADOW E&M-EST. PATIENT-LVL III: ICD-10-PCS | Mod: PBBFAC,,, | Performed by: MIDWIFE

## 2021-03-03 ENCOUNTER — ANESTHESIA (OUTPATIENT)
Dept: OBSTETRICS AND GYNECOLOGY | Facility: HOSPITAL | Age: 26
End: 2021-03-03
Payer: MEDICAID

## 2021-03-03 ENCOUNTER — ANESTHESIA EVENT (OUTPATIENT)
Dept: OBSTETRICS AND GYNECOLOGY | Facility: HOSPITAL | Age: 26
End: 2021-03-03
Payer: MEDICAID

## 2021-03-03 ENCOUNTER — HOSPITAL ENCOUNTER (INPATIENT)
Facility: HOSPITAL | Age: 26
LOS: 1 days | Discharge: HOME | End: 2021-03-04
Attending: OBSTETRICS & GYNECOLOGY | Admitting: OBSTETRICS & GYNECOLOGY
Payer: MEDICAID

## 2021-03-03 VITALS — SYSTOLIC BLOOD PRESSURE: 127 MMHG | OXYGEN SATURATION: 100 % | DIASTOLIC BLOOD PRESSURE: 62 MMHG

## 2021-03-03 DIAGNOSIS — O36.5930 IUGR (INTRAUTERINE GROWTH RESTRICTION) AFFECTING CARE OF MOTHER, THIRD TRIMESTER, NOT APPLICABLE OR UNSPECIFIED FETUS: ICD-10-CM

## 2021-03-03 LAB
ABO + RH BLD: NORMAL
BASOPHILS # BLD AUTO: 0.02 K/UL (ref 0–0.2)
BASOPHILS NFR BLD: 0.2 % (ref 0–1.9)
BLD GP AB SCN CELLS X3 SERPL QL: NORMAL
DIFFERENTIAL METHOD: ABNORMAL
EOSINOPHIL # BLD AUTO: 0.1 K/UL (ref 0–0.5)
EOSINOPHIL NFR BLD: 0.5 % (ref 0–8)
ERYTHROCYTE [DISTWIDTH] IN BLOOD BY AUTOMATED COUNT: 12.9 % (ref 11.5–14.5)
HCT VFR BLD AUTO: 34 % (ref 37–48.5)
HGB BLD-MCNC: 11.2 G/DL (ref 12–16)
HIV1+2 IGG SERPL QL IA.RAPID: NORMAL
IMM GRANULOCYTES # BLD AUTO: 0.06 K/UL (ref 0–0.04)
IMM GRANULOCYTES NFR BLD AUTO: 0.6 % (ref 0–0.5)
LYMPHOCYTES # BLD AUTO: 1.8 K/UL (ref 1–4.8)
LYMPHOCYTES NFR BLD: 18.2 % (ref 18–48)
MCH RBC QN AUTO: 29.9 PG (ref 27–31)
MCHC RBC AUTO-ENTMCNC: 32.9 G/DL (ref 32–36)
MCV RBC AUTO: 91 FL (ref 82–98)
MONOCYTES # BLD AUTO: 0.5 K/UL (ref 0.3–1)
MONOCYTES NFR BLD: 5.3 % (ref 4–15)
NEUTROPHILS # BLD AUTO: 7.3 K/UL (ref 1.8–7.7)
NEUTROPHILS NFR BLD: 75.2 % (ref 38–73)
NRBC BLD-RTO: 0 /100 WBC
PLATELET # BLD AUTO: 168 K/UL (ref 150–350)
PMV BLD AUTO: 12.2 FL (ref 9.2–12.9)
RBC # BLD AUTO: 3.74 M/UL (ref 4–5.4)
RPR SER QL: NORMAL
SARS-COV-2 RDRP RESP QL NAA+PROBE: NEGATIVE
WBC # BLD AUTO: 9.65 K/UL (ref 3.9–12.7)

## 2021-03-03 PROCEDURE — 27800516 HC TRAY, EPIDURAL COMBO: Performed by: ANESTHESIOLOGY

## 2021-03-03 PROCEDURE — 86900 BLOOD TYPING SEROLOGIC ABO: CPT

## 2021-03-03 PROCEDURE — 88307 PR  SURG PATH,LEVEL V: ICD-10-PCS | Mod: 26,,, | Performed by: PATHOLOGY

## 2021-03-03 PROCEDURE — 88307 TISSUE EXAM BY PATHOLOGIST: CPT | Mod: 26,,, | Performed by: PATHOLOGY

## 2021-03-03 PROCEDURE — 72100002 HC LABOR CARE, 1ST 8 HOURS

## 2021-03-03 PROCEDURE — 25000003 PHARM REV CODE 250: Performed by: MIDWIFE

## 2021-03-03 PROCEDURE — 63600175 PHARM REV CODE 636 W HCPCS: Performed by: NURSE ANESTHETIST, CERTIFIED REGISTERED

## 2021-03-03 PROCEDURE — 86592 SYPHILIS TEST NON-TREP QUAL: CPT

## 2021-03-03 PROCEDURE — 11000001 HC ACUTE MED/SURG PRIVATE ROOM

## 2021-03-03 PROCEDURE — 51701 INSERT BLADDER CATHETER: CPT

## 2021-03-03 PROCEDURE — 59409 PR OBSTETRICAL CARE,VAG DELIV ONLY: ICD-10-PCS | Mod: AT,,, | Performed by: ADVANCED PRACTICE MIDWIFE

## 2021-03-03 PROCEDURE — 63600175 PHARM REV CODE 636 W HCPCS: Performed by: ADVANCED PRACTICE MIDWIFE

## 2021-03-03 PROCEDURE — 63600175 PHARM REV CODE 636 W HCPCS: Performed by: MIDWIFE

## 2021-03-03 PROCEDURE — 59409 OBSTETRICAL CARE: CPT | Mod: AT,,, | Performed by: ADVANCED PRACTICE MIDWIFE

## 2021-03-03 PROCEDURE — 86703 HIV-1/HIV-2 1 RESULT ANTBDY: CPT

## 2021-03-03 PROCEDURE — 27200710 HC EPIDURAL INFUSION PUMP SET: Performed by: ANESTHESIOLOGY

## 2021-03-03 PROCEDURE — 72100003 HC LABOR CARE, EA. ADDL. 8 HRS

## 2021-03-03 PROCEDURE — 62326 NJX INTERLAMINAR LMBR/SAC: CPT | Performed by: NURSE ANESTHETIST, CERTIFIED REGISTERED

## 2021-03-03 PROCEDURE — 59200 INSERT CERVICAL DILATOR: CPT

## 2021-03-03 PROCEDURE — 88307 TISSUE EXAM BY PATHOLOGIST: CPT | Performed by: PATHOLOGY

## 2021-03-03 PROCEDURE — 85025 COMPLETE CBC W/AUTO DIFF WBC: CPT

## 2021-03-03 PROCEDURE — 25000003 PHARM REV CODE 250: Performed by: NURSE ANESTHETIST, CERTIFIED REGISTERED

## 2021-03-03 PROCEDURE — 51702 INSERT TEMP BLADDER CATH: CPT

## 2021-03-03 PROCEDURE — 72200005 HC VAGINAL DELIVERY LEVEL II

## 2021-03-03 PROCEDURE — U0002 COVID-19 LAB TEST NON-CDC: HCPCS

## 2021-03-03 PROCEDURE — 63600175 PHARM REV CODE 636 W HCPCS: Performed by: ANESTHESIOLOGY

## 2021-03-03 RX ORDER — HYDROCODONE BITARTRATE AND ACETAMINOPHEN 7.5; 325 MG/1; MG/1
1 TABLET ORAL EVERY 4 HOURS PRN
Status: DISCONTINUED | OUTPATIENT
Start: 2021-03-03 | End: 2021-03-04 | Stop reason: HOSPADM

## 2021-03-03 RX ORDER — ACETAMINOPHEN 325 MG/1
650 TABLET ORAL EVERY 6 HOURS PRN
Status: DISCONTINUED | OUTPATIENT
Start: 2021-03-03 | End: 2021-03-04 | Stop reason: HOSPADM

## 2021-03-03 RX ORDER — OXYTOCIN/RINGER'S LACTATE 30/500 ML
334 PLASTIC BAG, INJECTION (ML) INTRAVENOUS ONCE
Status: DISCONTINUED | OUTPATIENT
Start: 2021-03-03 | End: 2021-03-03

## 2021-03-03 RX ORDER — MISOPROSTOL 100 MCG
25 TABLET ORAL EVERY 6 HOURS
Status: DISCONTINUED | OUTPATIENT
Start: 2021-03-03 | End: 2021-03-03

## 2021-03-03 RX ORDER — OXYTOCIN/RINGER'S LACTATE 30/500 ML
95 PLASTIC BAG, INJECTION (ML) INTRAVENOUS ONCE
Status: DISCONTINUED | OUTPATIENT
Start: 2021-03-03 | End: 2021-03-04 | Stop reason: HOSPADM

## 2021-03-03 RX ORDER — DIPHENHYDRAMINE HCL 25 MG
25 CAPSULE ORAL EVERY 4 HOURS PRN
Status: DISCONTINUED | OUTPATIENT
Start: 2021-03-03 | End: 2021-03-04 | Stop reason: HOSPADM

## 2021-03-03 RX ORDER — SODIUM CHLORIDE, SODIUM LACTATE, POTASSIUM CHLORIDE, CALCIUM CHLORIDE 600; 310; 30; 20 MG/100ML; MG/100ML; MG/100ML; MG/100ML
INJECTION, SOLUTION INTRAVENOUS CONTINUOUS
Status: DISCONTINUED | OUTPATIENT
Start: 2021-03-03 | End: 2021-03-03

## 2021-03-03 RX ORDER — SIMETHICONE 80 MG
1 TABLET,CHEWABLE ORAL EVERY 6 HOURS PRN
Status: DISCONTINUED | OUTPATIENT
Start: 2021-03-03 | End: 2021-03-04 | Stop reason: HOSPADM

## 2021-03-03 RX ORDER — TERBUTALINE SULFATE 1 MG/ML
0.25 INJECTION SUBCUTANEOUS
Status: DISCONTINUED | OUTPATIENT
Start: 2021-03-03 | End: 2021-03-03

## 2021-03-03 RX ORDER — ONDANSETRON 8 MG/1
8 TABLET, ORALLY DISINTEGRATING ORAL EVERY 8 HOURS PRN
Status: DISCONTINUED | OUTPATIENT
Start: 2021-03-03 | End: 2021-03-03

## 2021-03-03 RX ORDER — SODIUM CITRATE AND CITRIC ACID MONOHYDRATE 334; 500 MG/5ML; MG/5ML
30 SOLUTION ORAL ONCE
Status: DISCONTINUED | OUTPATIENT
Start: 2021-03-03 | End: 2021-03-03

## 2021-03-03 RX ORDER — ROPIVACAINE HYDROCHLORIDE 2 MG/ML
INJECTION, SOLUTION EPIDURAL; INFILTRATION; PERINEURAL
Status: DISCONTINUED | OUTPATIENT
Start: 2021-03-03 | End: 2021-03-04

## 2021-03-03 RX ORDER — HYDROCODONE BITARTRATE AND ACETAMINOPHEN 5; 325 MG/1; MG/1
1 TABLET ORAL EVERY 4 HOURS PRN
Status: DISCONTINUED | OUTPATIENT
Start: 2021-03-03 | End: 2021-03-04 | Stop reason: HOSPADM

## 2021-03-03 RX ORDER — ROPIVACAINE HYDROCHLORIDE 2 MG/ML
INJECTION, SOLUTION EPIDURAL; INFILTRATION; PERINEURAL CONTINUOUS PRN
Status: DISCONTINUED | OUTPATIENT
Start: 2021-03-03 | End: 2021-03-04

## 2021-03-03 RX ORDER — DIPHENHYDRAMINE HYDROCHLORIDE 50 MG/ML
25 INJECTION INTRAMUSCULAR; INTRAVENOUS EVERY 4 HOURS PRN
Status: DISCONTINUED | OUTPATIENT
Start: 2021-03-03 | End: 2021-03-04 | Stop reason: HOSPADM

## 2021-03-03 RX ORDER — HYDROCORTISONE 25 MG/G
CREAM TOPICAL 3 TIMES DAILY PRN
Status: DISCONTINUED | OUTPATIENT
Start: 2021-03-03 | End: 2021-03-04 | Stop reason: HOSPADM

## 2021-03-03 RX ORDER — OXYTOCIN/RINGER'S LACTATE 30/500 ML
0-30 PLASTIC BAG, INJECTION (ML) INTRAVENOUS CONTINUOUS
Status: DISCONTINUED | OUTPATIENT
Start: 2021-03-03 | End: 2021-03-03

## 2021-03-03 RX ORDER — DOCUSATE SODIUM 100 MG/1
200 CAPSULE, LIQUID FILLED ORAL 2 TIMES DAILY PRN
Status: DISCONTINUED | OUTPATIENT
Start: 2021-03-03 | End: 2021-03-04 | Stop reason: HOSPADM

## 2021-03-03 RX ORDER — OXYTOCIN/RINGER'S LACTATE 30/500 ML
95 PLASTIC BAG, INJECTION (ML) INTRAVENOUS ONCE
Status: DISCONTINUED | OUTPATIENT
Start: 2021-03-03 | End: 2021-03-03

## 2021-03-03 RX ORDER — PRENATAL WITH FERROUS FUM AND FOLIC ACID 3080; 920; 120; 400; 22; 1.84; 3; 20; 10; 1; 12; 200; 27; 25; 2 [IU]/1; [IU]/1; MG/1; [IU]/1; MG/1; MG/1; MG/1; MG/1; MG/1; MG/1; UG/1; MG/1; MG/1; MG/1; MG/1
1 TABLET ORAL DAILY
Status: DISCONTINUED | OUTPATIENT
Start: 2021-03-04 | End: 2021-03-04 | Stop reason: HOSPADM

## 2021-03-03 RX ORDER — SIMETHICONE 80 MG
1 TABLET,CHEWABLE ORAL 4 TIMES DAILY PRN
Status: DISCONTINUED | OUTPATIENT
Start: 2021-03-03 | End: 2021-03-03

## 2021-03-03 RX ORDER — LIDOCAINE HYDROCHLORIDE AND EPINEPHRINE 15; 5 MG/ML; UG/ML
INJECTION, SOLUTION EPIDURAL
Status: DISCONTINUED | OUTPATIENT
Start: 2021-03-03 | End: 2021-03-04

## 2021-03-03 RX ORDER — FAMOTIDINE 10 MG/ML
20 INJECTION INTRAVENOUS ONCE
Status: DISCONTINUED | OUTPATIENT
Start: 2021-03-03 | End: 2021-03-03

## 2021-03-03 RX ORDER — PROCHLORPERAZINE EDISYLATE 5 MG/ML
5 INJECTION INTRAMUSCULAR; INTRAVENOUS EVERY 6 HOURS PRN
Status: DISCONTINUED | OUTPATIENT
Start: 2021-03-03 | End: 2021-03-04 | Stop reason: HOSPADM

## 2021-03-03 RX ORDER — CALCIUM CARBONATE 200(500)MG
500 TABLET,CHEWABLE ORAL 3 TIMES DAILY PRN
Status: DISCONTINUED | OUTPATIENT
Start: 2021-03-03 | End: 2021-03-03

## 2021-03-03 RX ORDER — IBUPROFEN 600 MG/1
600 TABLET ORAL EVERY 6 HOURS
Status: DISCONTINUED | OUTPATIENT
Start: 2021-03-04 | End: 2021-03-04 | Stop reason: HOSPADM

## 2021-03-03 RX ORDER — ONDANSETRON 8 MG/1
8 TABLET, ORALLY DISINTEGRATING ORAL EVERY 8 HOURS PRN
Status: DISCONTINUED | OUTPATIENT
Start: 2021-03-03 | End: 2021-03-04 | Stop reason: HOSPADM

## 2021-03-03 RX ORDER — ROPIVACAINE HYDROCHLORIDE 2 MG/ML
14 INJECTION, SOLUTION EPIDURAL; INFILTRATION CONTINUOUS
Status: DISCONTINUED | OUTPATIENT
Start: 2021-03-03 | End: 2021-03-03

## 2021-03-03 RX ORDER — PROCHLORPERAZINE EDISYLATE 5 MG/ML
5 INJECTION INTRAMUSCULAR; INTRAVENOUS EVERY 6 HOURS PRN
Status: DISCONTINUED | OUTPATIENT
Start: 2021-03-03 | End: 2021-03-03

## 2021-03-03 RX ADMIN — Medication 25 MCG: at 06:03

## 2021-03-03 RX ADMIN — ROPIVACAINE HYDROCHLORIDE 14 ML/HR: 2 INJECTION, SOLUTION EPIDURAL; INFILTRATION at 04:03

## 2021-03-03 RX ADMIN — ROPIVACAINE HYDROCHLORIDE 10 ML: 2 INJECTION, SOLUTION EPIDURAL; INFILTRATION at 04:03

## 2021-03-03 RX ADMIN — LIDOCAINE HYDROCHLORIDE,EPINEPHRINE BITARTRATE 3 ML: 15; .005 INJECTION, SOLUTION EPIDURAL; INFILTRATION; INTRACAUDAL; PERINEURAL at 04:03

## 2021-03-03 RX ADMIN — LIDOCAINE HYDROCHLORIDE,EPINEPHRINE BITARTRATE 5 ML: 15; .005 INJECTION, SOLUTION EPIDURAL; INFILTRATION; INTRACAUDAL; PERINEURAL at 06:03

## 2021-03-03 RX ADMIN — Medication 2 MILLI-UNITS/MIN: at 06:03

## 2021-03-03 RX ADMIN — SODIUM CHLORIDE, SODIUM LACTATE, POTASSIUM CHLORIDE, AND CALCIUM CHLORIDE 1000 ML: .6; .31; .03; .02 INJECTION, SOLUTION INTRAVENOUS at 04:03

## 2021-03-03 RX ADMIN — SODIUM CHLORIDE, SODIUM LACTATE, POTASSIUM CHLORIDE, AND CALCIUM CHLORIDE 1000 ML: .6; .31; .03; .02 INJECTION, SOLUTION INTRAVENOUS at 06:03

## 2021-03-03 RX ADMIN — ONDANSETRON 8 MG: 8 TABLET, ORALLY DISINTEGRATING ORAL at 05:03

## 2021-03-03 RX ADMIN — ROPIVACAINE HYDROCHLORIDE 5 ML: 2 INJECTION, SOLUTION EPIDURAL; INFILTRATION at 06:03

## 2021-03-04 VITALS
WEIGHT: 163 LBS | SYSTOLIC BLOOD PRESSURE: 119 MMHG | BODY MASS INDEX: 30 KG/M2 | RESPIRATION RATE: 18 BRPM | TEMPERATURE: 98 F | HEIGHT: 62 IN | HEART RATE: 68 BPM | DIASTOLIC BLOOD PRESSURE: 73 MMHG | OXYGEN SATURATION: 98 %

## 2021-03-04 PROBLEM — Z34.83 ENCOUNTER FOR SUPERVISION OF OTHER NORMAL PREGNANCY, THIRD TRIMESTER: Status: RESOLVED | Noted: 2020-09-01 | Resolved: 2021-03-04

## 2021-03-04 PROBLEM — O36.5930 IUGR (INTRAUTERINE GROWTH RESTRICTION) AFFECTING CARE OF MOTHER, THIRD TRIMESTER, NOT APPLICABLE OR UNSPECIFIED FETUS: Status: RESOLVED | Noted: 2021-02-26 | Resolved: 2021-03-04

## 2021-03-04 PROCEDURE — 99238 PR HOSPITAL DISCHARGE DAY,<30 MIN: ICD-10-PCS | Mod: ,,, | Performed by: MIDWIFE

## 2021-03-04 PROCEDURE — 99238 HOSP IP/OBS DSCHRG MGMT 30/<: CPT | Mod: ,,, | Performed by: MIDWIFE

## 2021-03-04 PROCEDURE — 25000003 PHARM REV CODE 250: Performed by: ADVANCED PRACTICE MIDWIFE

## 2021-03-04 RX ADMIN — IBUPROFEN 600 MG: 600 TABLET ORAL at 12:03

## 2021-03-04 RX ADMIN — PRENATAL VITAMINS-IRON FUMARATE 27 MG IRON-FOLIC ACID 0.8 MG TABLET 1 TABLET: at 08:03

## 2021-03-08 LAB
FINAL PATHOLOGIC DIAGNOSIS: NORMAL
GROSS: NORMAL
Lab: NORMAL

## 2021-07-06 ENCOUNTER — TELEPHONE (OUTPATIENT)
Dept: OBSTETRICS AND GYNECOLOGY | Facility: CLINIC | Age: 26
End: 2021-07-06

## 2021-07-23 ENCOUNTER — TELEPHONE (OUTPATIENT)
Dept: OBSTETRICS AND GYNECOLOGY | Facility: CLINIC | Age: 26
End: 2021-07-23

## 2021-07-26 ENCOUNTER — TELEPHONE (OUTPATIENT)
Dept: OBSTETRICS AND GYNECOLOGY | Facility: CLINIC | Age: 26
End: 2021-07-26

## 2021-07-27 ENCOUNTER — OFFICE VISIT (OUTPATIENT)
Dept: OBSTETRICS AND GYNECOLOGY | Facility: CLINIC | Age: 26
End: 2021-07-27
Payer: MEDICAID

## 2021-07-27 VITALS
DIASTOLIC BLOOD PRESSURE: 79 MMHG | HEIGHT: 62 IN | BODY MASS INDEX: 28.07 KG/M2 | SYSTOLIC BLOOD PRESSURE: 116 MMHG | WEIGHT: 152.56 LBS

## 2021-07-27 DIAGNOSIS — N93.8 DUB (DYSFUNCTIONAL UTERINE BLEEDING): Primary | ICD-10-CM

## 2021-07-27 PROBLEM — U07.1 COVID-19: Status: RESOLVED | Noted: 2020-09-01 | Resolved: 2021-07-27

## 2021-07-27 PROBLEM — R87.613 HSIL (HIGH GRADE SQUAMOUS INTRAEPITHELIAL LESION) ON PAP SMEAR OF CERVIX: Status: RESOLVED | Noted: 2017-04-19 | Resolved: 2021-07-27

## 2021-07-27 PROCEDURE — 99213 OFFICE O/P EST LOW 20 MIN: CPT | Mod: PBBFAC | Performed by: OBSTETRICS & GYNECOLOGY

## 2021-07-27 PROCEDURE — 99213 PR OFFICE/OUTPT VISIT, EST, LEVL III, 20-29 MIN: ICD-10-PCS | Mod: S$PBB,,, | Performed by: OBSTETRICS & GYNECOLOGY

## 2021-07-27 PROCEDURE — 99999 PR PBB SHADOW E&M-EST. PATIENT-LVL III: ICD-10-PCS | Mod: PBBFAC,,, | Performed by: OBSTETRICS & GYNECOLOGY

## 2021-07-27 PROCEDURE — 99213 OFFICE O/P EST LOW 20 MIN: CPT | Mod: S$PBB,,, | Performed by: OBSTETRICS & GYNECOLOGY

## 2021-07-27 PROCEDURE — 99999 PR PBB SHADOW E&M-EST. PATIENT-LVL III: CPT | Mod: PBBFAC,,, | Performed by: OBSTETRICS & GYNECOLOGY

## 2021-07-27 RX ORDER — NORGESTIMATE AND ETHINYL ESTRADIOL 0.25-0.035
1 KIT ORAL DAILY
Qty: 28 TABLET | Refills: 11 | Status: SHIPPED | OUTPATIENT
Start: 2021-07-27 | End: 2022-03-15

## 2021-07-27 RX ORDER — NAPROXEN 500 MG/1
500 TABLET ORAL 2 TIMES DAILY
COMMUNITY
Start: 2021-07-23 | End: 2021-11-05

## 2021-07-27 RX ORDER — MEDROXYPROGESTERONE ACETATE 10 MG/1
10 TABLET ORAL NIGHTLY
COMMUNITY
Start: 2021-07-23 | End: 2021-07-27

## 2021-11-05 ENCOUNTER — HOSPITAL ENCOUNTER (EMERGENCY)
Facility: HOSPITAL | Age: 26
Discharge: HOME | End: 2021-11-05
Attending: EMERGENCY MEDICINE
Payer: MEDICAID

## 2021-11-05 VITALS
RESPIRATION RATE: 16 BRPM | SYSTOLIC BLOOD PRESSURE: 119 MMHG | HEART RATE: 76 BPM | WEIGHT: 120 LBS | TEMPERATURE: 98 F | OXYGEN SATURATION: 100 % | HEIGHT: 62 IN | BODY MASS INDEX: 22.08 KG/M2 | DIASTOLIC BLOOD PRESSURE: 66 MMHG

## 2021-11-05 DIAGNOSIS — F32.A DEPRESSION, UNSPECIFIED DEPRESSION TYPE: Primary | ICD-10-CM

## 2021-11-05 LAB
ALBUMIN SERPL BCP-MCNC: 4.2 G/DL (ref 3.5–5.2)
ALP SERPL-CCNC: 31 U/L (ref 55–135)
ALT SERPL W/O P-5'-P-CCNC: 17 U/L (ref 10–44)
AMPHET+METHAMPHET UR QL: NEGATIVE
ANION GAP SERPL CALC-SCNC: 16 MMOL/L (ref 8–16)
APAP SERPL-MCNC: <3 UG/ML (ref 10–20)
AST SERPL-CCNC: 14 U/L (ref 10–40)
B-HCG UR QL: NEGATIVE
BACTERIA #/AREA URNS AUTO: NORMAL /HPF
BARBITURATES UR QL SCN>200 NG/ML: NEGATIVE
BASOPHILS # BLD AUTO: 0.03 K/UL (ref 0–0.2)
BASOPHILS NFR BLD: 0.5 % (ref 0–1.9)
BENZODIAZ UR QL SCN>200 NG/ML: NEGATIVE
BILIRUB SERPL-MCNC: 0.8 MG/DL (ref 0.1–1)
BILIRUB UR QL STRIP: ABNORMAL
BUN SERPL-MCNC: 14 MG/DL (ref 6–20)
BZE UR QL SCN: NEGATIVE
CALCIUM SERPL-MCNC: 9.1 MG/DL (ref 8.7–10.5)
CANNABINOIDS UR QL SCN: ABNORMAL
CHLORIDE SERPL-SCNC: 105 MMOL/L (ref 95–110)
CLARITY UR REFRACT.AUTO: ABNORMAL
CO2 SERPL-SCNC: 19 MMOL/L (ref 23–29)
COLOR UR AUTO: YELLOW
CREAT SERPL-MCNC: 0.7 MG/DL (ref 0.5–1.4)
CREAT UR-MCNC: 260.3 MG/DL (ref 15–325)
CTP QC/QA: YES
DIFFERENTIAL METHOD: NORMAL
EOSINOPHIL # BLD AUTO: 0 K/UL (ref 0–0.5)
EOSINOPHIL NFR BLD: 0.5 % (ref 0–8)
ERYTHROCYTE [DISTWIDTH] IN BLOOD BY AUTOMATED COUNT: 12.9 % (ref 11.5–14.5)
EST. GFR  (AFRICAN AMERICAN): >60 ML/MIN/1.73 M^2
EST. GFR  (NON AFRICAN AMERICAN): >60 ML/MIN/1.73 M^2
ETHANOL SERPL-MCNC: <10 MG/DL
GLUCOSE SERPL-MCNC: 86 MG/DL (ref 70–110)
GLUCOSE UR QL STRIP: NEGATIVE
HCT VFR BLD AUTO: 37.7 % (ref 37–48.5)
HGB BLD-MCNC: 12.8 G/DL (ref 12–16)
HGB UR QL STRIP: NEGATIVE
IMM GRANULOCYTES # BLD AUTO: 0.02 K/UL (ref 0–0.04)
IMM GRANULOCYTES NFR BLD AUTO: 0.4 % (ref 0–0.5)
KETONES UR QL STRIP: ABNORMAL
LEUKOCYTE ESTERASE UR QL STRIP: ABNORMAL
LYMPHOCYTES # BLD AUTO: 1.7 K/UL (ref 1–4.8)
LYMPHOCYTES NFR BLD: 29.4 % (ref 18–48)
MCH RBC QN AUTO: 29.4 PG (ref 27–31)
MCHC RBC AUTO-ENTMCNC: 34 G/DL (ref 32–36)
MCV RBC AUTO: 87 FL (ref 82–98)
METHADONE UR QL SCN>300 NG/ML: NEGATIVE
MICROSCOPIC COMMENT: NORMAL
MONOCYTES # BLD AUTO: 0.4 K/UL (ref 0.3–1)
MONOCYTES NFR BLD: 7.8 % (ref 4–15)
NEUTROPHILS # BLD AUTO: 3.5 K/UL (ref 1.8–7.7)
NEUTROPHILS NFR BLD: 61.4 % (ref 38–73)
NITRITE UR QL STRIP: NEGATIVE
NRBC BLD-RTO: 0 /100 WBC
OPIATES UR QL SCN: NEGATIVE
PCP UR QL SCN>25 NG/ML: NEGATIVE
PH UR STRIP: 6 [PH] (ref 5–8)
PLATELET # BLD AUTO: 229 K/UL (ref 150–450)
PMV BLD AUTO: 11.4 FL (ref 9.2–12.9)
POTASSIUM SERPL-SCNC: 3.5 MMOL/L (ref 3.5–5.1)
PROT SERPL-MCNC: 7.4 G/DL (ref 6–8.4)
PROT UR QL STRIP: NEGATIVE
RBC # BLD AUTO: 4.35 M/UL (ref 4–5.4)
SARS-COV-2 RDRP RESP QL NAA+PROBE: NEGATIVE
SODIUM SERPL-SCNC: 140 MMOL/L (ref 136–145)
SP GR UR STRIP: 1.02 (ref 1–1.03)
SQUAMOUS #/AREA URNS AUTO: 5 /HPF
TOXICOLOGY INFORMATION: ABNORMAL
TSH SERPL DL<=0.005 MIU/L-ACNC: 1.08 UIU/ML (ref 0.4–4)
URN SPEC COLLECT METH UR: ABNORMAL
UROBILINOGEN UR STRIP-ACNC: <2 EU/DL
WBC # BLD AUTO: 5.64 K/UL (ref 3.9–12.7)
WBC #/AREA URNS AUTO: 4 /HPF (ref 0–5)

## 2021-11-05 PROCEDURE — 80053 COMPREHEN METABOLIC PANEL: CPT | Mod: ER | Performed by: EMERGENCY MEDICINE

## 2021-11-05 PROCEDURE — 99284 EMERGENCY DEPT VISIT MOD MDM: CPT | Mod: 25,ER

## 2021-11-05 PROCEDURE — 85025 COMPLETE CBC W/AUTO DIFF WBC: CPT | Mod: ER | Performed by: EMERGENCY MEDICINE

## 2021-11-05 PROCEDURE — 81025 URINE PREGNANCY TEST: CPT | Mod: ER | Performed by: EMERGENCY MEDICINE

## 2021-11-05 PROCEDURE — 80143 DRUG ASSAY ACETAMINOPHEN: CPT | Mod: ER | Performed by: EMERGENCY MEDICINE

## 2021-11-05 PROCEDURE — 82077 ASSAY SPEC XCP UR&BREATH IA: CPT | Mod: ER | Performed by: EMERGENCY MEDICINE

## 2021-11-05 PROCEDURE — 84443 ASSAY THYROID STIM HORMONE: CPT | Mod: ER | Performed by: EMERGENCY MEDICINE

## 2021-11-05 PROCEDURE — 81000 URINALYSIS NONAUTO W/SCOPE: CPT | Mod: 59,ER | Performed by: EMERGENCY MEDICINE

## 2021-11-05 PROCEDURE — 25000003 PHARM REV CODE 250: Mod: ER | Performed by: EMERGENCY MEDICINE

## 2021-11-05 PROCEDURE — 99204 PR OFFICE/OUTPT VISIT, NEW, LEVL IV, 45-59 MIN: ICD-10-PCS | Mod: 95,AF,HB, | Performed by: PSYCHIATRY & NEUROLOGY

## 2021-11-05 PROCEDURE — 80307 DRUG TEST PRSMV CHEM ANLYZR: CPT | Mod: ER | Performed by: EMERGENCY MEDICINE

## 2021-11-05 PROCEDURE — 99204 OFFICE O/P NEW MOD 45 MIN: CPT | Mod: 95,AF,HB, | Performed by: PSYCHIATRY & NEUROLOGY

## 2021-11-05 PROCEDURE — U0002 COVID-19 LAB TEST NON-CDC: HCPCS | Mod: ER | Performed by: EMERGENCY MEDICINE

## 2021-11-05 RX ORDER — FLUOXETINE 20 MG/1
20 TABLET ORAL DAILY
Qty: 30 TABLET | Refills: 0 | Status: SHIPPED | OUTPATIENT
Start: 2021-11-05 | End: 2021-12-21 | Stop reason: ALTCHOICE

## 2021-11-05 RX ORDER — HYDROXYZINE PAMOATE 50 MG/1
50 CAPSULE ORAL 2 TIMES DAILY PRN
Qty: 30 CAPSULE | Refills: 0 | Status: SHIPPED | OUTPATIENT
Start: 2021-11-05 | End: 2021-12-05

## 2021-11-05 RX ORDER — ONDANSETRON 4 MG/1
8 TABLET, ORALLY DISINTEGRATING ORAL
Status: COMPLETED | OUTPATIENT
Start: 2021-11-05 | End: 2021-11-05

## 2021-11-05 RX ADMIN — ONDANSETRON 8 MG: 4 TABLET, ORALLY DISINTEGRATING ORAL at 07:11

## 2021-11-30 ENCOUNTER — TELEPHONE (OUTPATIENT)
Dept: OBSTETRICS AND GYNECOLOGY | Facility: CLINIC | Age: 26
End: 2021-11-30
Payer: MEDICAID

## 2021-12-06 ENCOUNTER — TELEPHONE (OUTPATIENT)
Dept: OBSTETRICS AND GYNECOLOGY | Facility: CLINIC | Age: 26
End: 2021-12-06
Payer: MEDICAID

## 2021-12-21 ENCOUNTER — OFFICE VISIT (OUTPATIENT)
Dept: PRIMARY CARE CLINIC | Facility: CLINIC | Age: 26
End: 2021-12-21
Payer: MEDICAID

## 2021-12-21 VITALS
DIASTOLIC BLOOD PRESSURE: 86 MMHG | OXYGEN SATURATION: 100 % | WEIGHT: 130.38 LBS | TEMPERATURE: 99 F | HEART RATE: 96 BPM | SYSTOLIC BLOOD PRESSURE: 142 MMHG | BODY MASS INDEX: 23.99 KG/M2 | HEIGHT: 62 IN

## 2021-12-21 DIAGNOSIS — F41.8 DEPRESSION WITH ANXIETY: Primary | ICD-10-CM

## 2021-12-21 DIAGNOSIS — R03.0 ELEVATED BLOOD-PRESSURE READING WITHOUT DIAGNOSIS OF HYPERTENSION: ICD-10-CM

## 2021-12-21 DIAGNOSIS — F51.05 INSOMNIA DUE TO ANXIETY AND FEAR: ICD-10-CM

## 2021-12-21 DIAGNOSIS — K21.9 GASTROESOPHAGEAL REFLUX DISEASE WITHOUT ESOPHAGITIS: ICD-10-CM

## 2021-12-21 DIAGNOSIS — Z13.220 LIPID SCREENING: ICD-10-CM

## 2021-12-21 DIAGNOSIS — F40.9 INSOMNIA DUE TO ANXIETY AND FEAR: ICD-10-CM

## 2021-12-21 PROCEDURE — 99214 OFFICE O/P EST MOD 30 MIN: CPT | Mod: PBBFAC,PN | Performed by: FAMILY MEDICINE

## 2021-12-21 PROCEDURE — 99999 PR PBB SHADOW E&M-EST. PATIENT-LVL IV: CPT | Mod: PBBFAC,,, | Performed by: FAMILY MEDICINE

## 2021-12-21 PROCEDURE — 99204 OFFICE O/P NEW MOD 45 MIN: CPT | Mod: S$PBB,,, | Performed by: FAMILY MEDICINE

## 2021-12-21 PROCEDURE — 99999 PR PBB SHADOW E&M-EST. PATIENT-LVL IV: ICD-10-PCS | Mod: PBBFAC,,, | Performed by: FAMILY MEDICINE

## 2021-12-21 PROCEDURE — 99204 PR OFFICE/OUTPT VISIT, NEW, LEVL IV, 45-59 MIN: ICD-10-PCS | Mod: S$PBB,,, | Performed by: FAMILY MEDICINE

## 2021-12-21 RX ORDER — ESCITALOPRAM OXALATE 10 MG/1
10 TABLET ORAL DAILY
Qty: 30 TABLET | Refills: 5 | Status: SHIPPED | OUTPATIENT
Start: 2021-12-21 | End: 2022-01-12 | Stop reason: DRUGHIGH

## 2021-12-21 RX ORDER — PANTOPRAZOLE SODIUM 40 MG/1
40 TABLET, DELAYED RELEASE ORAL DAILY
Qty: 30 TABLET | Refills: 5 | Status: SHIPPED | OUTPATIENT
Start: 2021-12-21 | End: 2022-01-12 | Stop reason: SDUPTHER

## 2021-12-21 RX ORDER — HYDROXYZINE HYDROCHLORIDE 50 MG/1
50 TABLET, FILM COATED ORAL NIGHTLY PRN
Qty: 30 TABLET | Refills: 5 | Status: SHIPPED | OUTPATIENT
Start: 2021-12-21 | End: 2022-01-12 | Stop reason: SDUPTHER

## 2022-01-04 ENCOUNTER — PATIENT OUTREACH (OUTPATIENT)
Dept: ADMINISTRATIVE | Facility: HOSPITAL | Age: 27
End: 2022-01-04
Payer: MEDICAID

## 2022-01-05 ENCOUNTER — CLINICAL SUPPORT (OUTPATIENT)
Dept: PRIMARY CARE CLINIC | Facility: CLINIC | Age: 27
End: 2022-01-05
Payer: MEDICAID

## 2022-01-05 VITALS
BODY MASS INDEX: 24.26 KG/M2 | RESPIRATION RATE: 18 BRPM | HEART RATE: 80 BPM | HEIGHT: 62 IN | DIASTOLIC BLOOD PRESSURE: 72 MMHG | WEIGHT: 131.81 LBS | SYSTOLIC BLOOD PRESSURE: 136 MMHG | OXYGEN SATURATION: 99 % | TEMPERATURE: 99 F

## 2022-01-05 DIAGNOSIS — R03.0 ELEVATED BLOOD-PRESSURE READING WITHOUT DIAGNOSIS OF HYPERTENSION: Primary | ICD-10-CM

## 2022-01-05 PROCEDURE — 99999 PR PBB SHADOW E&M-EST. PATIENT-LVL III: CPT | Mod: PBBFAC,,,

## 2022-01-05 PROCEDURE — 99213 OFFICE O/P EST LOW 20 MIN: CPT | Mod: PBBFAC,PN

## 2022-01-05 PROCEDURE — 99999 PR PBB SHADOW E&M-EST. PATIENT-LVL III: ICD-10-PCS | Mod: PBBFAC,,,

## 2022-01-05 NOTE — PROGRESS NOTES
Patient presented for bp check. Her bp was 136/72. She mentioned that her newly prescribed meds were helping a lot but not enough. She thinks she will need a med adjustment at her next visit. I rescheduled the blood work appointment that she missed, told her about her next visit with Dr. Mackenzie and she was free to go.

## 2022-01-10 ENCOUNTER — LAB VISIT (OUTPATIENT)
Dept: LAB | Facility: HOSPITAL | Age: 27
End: 2022-01-10
Attending: FAMILY MEDICINE
Payer: MEDICAID

## 2022-01-10 DIAGNOSIS — Z13.220 LIPID SCREENING: ICD-10-CM

## 2022-01-10 LAB
CHOLEST SERPL-MCNC: 198 MG/DL (ref 120–199)
CHOLEST/HDLC SERPL: 3.5 {RATIO} (ref 2–5)
HDLC SERPL-MCNC: 56 MG/DL (ref 40–75)
HDLC SERPL: 28.3 % (ref 20–50)
LDLC SERPL CALC-MCNC: 123.4 MG/DL (ref 63–159)
NONHDLC SERPL-MCNC: 142 MG/DL
TRIGL SERPL-MCNC: 93 MG/DL (ref 30–150)

## 2022-01-10 PROCEDURE — 36415 COLL VENOUS BLD VENIPUNCTURE: CPT | Mod: PN | Performed by: FAMILY MEDICINE

## 2022-01-10 PROCEDURE — 80061 LIPID PANEL: CPT | Performed by: FAMILY MEDICINE

## 2022-01-12 ENCOUNTER — OFFICE VISIT (OUTPATIENT)
Dept: PRIMARY CARE CLINIC | Facility: CLINIC | Age: 27
End: 2022-01-12
Payer: MEDICAID

## 2022-01-12 VITALS
OXYGEN SATURATION: 99 % | BODY MASS INDEX: 24.81 KG/M2 | RESPIRATION RATE: 18 BRPM | SYSTOLIC BLOOD PRESSURE: 120 MMHG | DIASTOLIC BLOOD PRESSURE: 76 MMHG | HEART RATE: 72 BPM | WEIGHT: 134.81 LBS | HEIGHT: 62 IN | TEMPERATURE: 98 F

## 2022-01-12 DIAGNOSIS — F41.8 DEPRESSION WITH ANXIETY: Primary | ICD-10-CM

## 2022-01-12 DIAGNOSIS — K21.9 GASTROESOPHAGEAL REFLUX DISEASE WITHOUT ESOPHAGITIS: ICD-10-CM

## 2022-01-12 DIAGNOSIS — F51.05 INSOMNIA DUE TO ANXIETY AND FEAR: ICD-10-CM

## 2022-01-12 DIAGNOSIS — F40.9 INSOMNIA DUE TO ANXIETY AND FEAR: ICD-10-CM

## 2022-01-12 PROCEDURE — 3074F PR MOST RECENT SYSTOLIC BLOOD PRESSURE < 130 MM HG: ICD-10-PCS | Mod: CPTII,,, | Performed by: FAMILY MEDICINE

## 2022-01-12 PROCEDURE — 3078F PR MOST RECENT DIASTOLIC BLOOD PRESSURE < 80 MM HG: ICD-10-PCS | Mod: CPTII,,, | Performed by: FAMILY MEDICINE

## 2022-01-12 PROCEDURE — 1159F PR MEDICATION LIST DOCUMENTED IN MEDICAL RECORD: ICD-10-PCS | Mod: CPTII,,, | Performed by: FAMILY MEDICINE

## 2022-01-12 PROCEDURE — 3008F PR BODY MASS INDEX (BMI) DOCUMENTED: ICD-10-PCS | Mod: CPTII,,, | Performed by: FAMILY MEDICINE

## 2022-01-12 PROCEDURE — 99214 OFFICE O/P EST MOD 30 MIN: CPT | Mod: PBBFAC,PN | Performed by: FAMILY MEDICINE

## 2022-01-12 PROCEDURE — 99214 OFFICE O/P EST MOD 30 MIN: CPT | Mod: S$PBB,,, | Performed by: FAMILY MEDICINE

## 2022-01-12 PROCEDURE — 1159F MED LIST DOCD IN RCRD: CPT | Mod: CPTII,,, | Performed by: FAMILY MEDICINE

## 2022-01-12 PROCEDURE — 3074F SYST BP LT 130 MM HG: CPT | Mod: CPTII,,, | Performed by: FAMILY MEDICINE

## 2022-01-12 PROCEDURE — 99999 PR PBB SHADOW E&M-EST. PATIENT-LVL IV: ICD-10-PCS | Mod: PBBFAC,,, | Performed by: FAMILY MEDICINE

## 2022-01-12 PROCEDURE — 99214 PR OFFICE/OUTPT VISIT, EST, LEVL IV, 30-39 MIN: ICD-10-PCS | Mod: S$PBB,,, | Performed by: FAMILY MEDICINE

## 2022-01-12 PROCEDURE — 3008F BODY MASS INDEX DOCD: CPT | Mod: CPTII,,, | Performed by: FAMILY MEDICINE

## 2022-01-12 PROCEDURE — 99999 PR PBB SHADOW E&M-EST. PATIENT-LVL IV: CPT | Mod: PBBFAC,,, | Performed by: FAMILY MEDICINE

## 2022-01-12 PROCEDURE — 3078F DIAST BP <80 MM HG: CPT | Mod: CPTII,,, | Performed by: FAMILY MEDICINE

## 2022-01-12 RX ORDER — PANTOPRAZOLE SODIUM 40 MG/1
40 TABLET, DELAYED RELEASE ORAL 2 TIMES DAILY
Qty: 60 TABLET | Refills: 1 | Status: SHIPPED | OUTPATIENT
Start: 2022-01-12 | End: 2022-03-11

## 2022-01-12 RX ORDER — HYDROXYZINE HYDROCHLORIDE 50 MG/1
50 TABLET, FILM COATED ORAL NIGHTLY PRN
Qty: 30 TABLET | Refills: 5 | Status: SHIPPED | OUTPATIENT
Start: 2022-01-12 | End: 2022-02-11

## 2022-01-12 RX ORDER — ESCITALOPRAM OXALATE 20 MG/1
20 TABLET ORAL DAILY
Qty: 30 TABLET | Refills: 5 | Status: SHIPPED | OUTPATIENT
Start: 2022-01-12 | End: 2022-03-15 | Stop reason: ALTCHOICE

## 2022-01-12 NOTE — PATIENT INSTRUCTIONS
Patient Education       Acid Reflux and GERD in Adults Discharge Instructions   About this topic   GERD stands for gastroesophageal reflux disease. It is sometimes called reflux or acid reflux. Acid reflux happens when your stomach acid backs up into your esophagus, the tube that carries your food from your mouth to your stomach. This can be uncomfortable. You may have stomach or chest pain (heartburn), trouble swallowing, or an upset stomach. Some people have a cough or sore throat.  Most of the time, you can use over-the-counter medicines to help with this problem.       What care is needed at home?   · Ask your doctor what you need to do when you go home. Make sure you ask questions if you do not understand what the doctor says.  · Raise the head of your bed by 6 to 8 inches (15 to 20 cm). Use wood or rubber blocks under 2 legs or try a foam wedge under your mattress. Just sleeping with your head raised on pillows is not enough.  · Avoid beer, wine, and mixed drinks and avoid caffeine.  · Keep a healthy weight. If you are too heavy, lose weight.  · If you smoke, try to quit. Your doctor or nurse can help.  · Keep a diary of your signs. Write down what you had to eat before you had reflux. This will help you learn which foods cause you problems. For some people, they need to avoid coffee, chocolate, alcohol, spicy or fatty foods, or peppermint.  · Avoid eating for 2 to 3 hours before bedtime. Lying down after you eat can make reflux worse.  · Avoid belts and clothes that are too tight.  What follow-up care is needed?   Your doctor may ask you to make visits to the office to check on your progress. Be sure to keep these visits.  What drugs may be needed?   The doctor may order drugs to:  · Relieve heartburn  · Prevent reflux  · Lessen acid production  · Heal the esophageal lining  Will physical activity be limited?   Your physical activities will not be limited.  What problems could happen?    · Asthma  · Precancerous changes in the food pipe  · Long-term cough  · Dental problems  · Higher risk of cancer of the food pipe. This is esophageal cancer.  · Narrowing of the food pipe. This is a stricture.  · Open sore in the food pipe. This is an ulcer.  When do I need to call the doctor?   · You have signs of a heart attack, which may include:  ? Severe chest pain, pressure, or discomfort with:  § Breathing trouble, sweating, upset stomach, or cold, clammy skin.  § Pain in your arms, back, or jaw.  § Worse pain with activity like walking up stairs.  § Fast or irregular heartbeat.  § Feeling dizzy, faint, or weak.  · You have sudden, severe belly pain or the belly pain is constant.  · You have blood in the undigested food and acid that comes up, or stool that looks red, black, or like tar.  · You feel like your food gets stuck or you have pain when you swallow.  · You lose weight when you are not trying to.  · You choke when you are eating.  · Your reflux is very bad, very frequent, or not helped by over-the-counter medicines.  · You keep throwing up.  Teach Back: Helping You Understand   The Teach Back Method helps you understand the information we are giving you. After you talk with the staff, tell them in your own words what you learned. This helps to make sure the staff has described each thing clearly. It also helps to explain things that may have been confusing. Before going home, make sure you can do these:  · I can tell you about my condition.  · I can tell you what changes I need to make with my eating habits to ease the reflux.  · I can tell you what I will do if I am throwing up fluid that looks like blood or coffee grounds.  Where can I learn more?   American Academy [employer]  https://familydoctor.org/condition/refluxacid-reflux/   [facility] Choices  https://www.nhs.uk/conditions/heartburn-and-acid-reflux/   Last Reviewed Date   2021-06-09  Consumer Information Use and Disclaimer   This  information is not specific medical advice and does not replace information you receive from your health care provider. This is only a brief summary of general information. It does NOT include all information about conditions, illnesses, injuries, tests, procedures, treatments, therapies, discharge instructions or life-style choices that may apply to you. You must talk with your health care provider for complete information about your health and treatment options. This information should not be used to decide whether or not to accept your health care providers advice, instructions or recommendations. Only your health care provider has the knowledge and training to provide advice that is right for you.  Copyright   Copyright © 2021 [employer] and its affiliates and/or licensors. All rights reserved.

## 2022-01-12 NOTE — PROGRESS NOTES
Subjective:       Patient ID: Gayla Montana is a 26 y.o. female.    Chief Complaint: Anxiety and depression and insomnia and GERD      HPI   History of Present Illness:   Gayla Montana 26 y.o. female presents today with   Anxiety  with depression: doing well since starting lexapro a month ago. Would like to go up in dose. Back in her house. Keeps children for a friend.  Her children are dong well too.  Insomnia is controlled on Hydroxyzine. Asking for refill.  GERD: Not controlled on protonix 40 mg, advocates that her diet is bad.  Denies marijuana since the last visit  She is antivaxer and so is her whole family.    Past Medical History:   Diagnosis Date    Abnormal Pap smear of cervix     MITZI 2    Depression     Headache(784.0)     Hemorrhoids     Inflammatory bowel disease      Family History   Problem Relation Age of Onset    Diabetes Mother     Ovarian cancer Mother     Diabetes Father     No Known Problems Brother     No Known Problems Sister     Multiple sclerosis Maternal Aunt     Ovarian cancer Maternal Aunt     Breast cancer Neg Hx     Colon cancer Neg Hx     Thrombosis Neg Hx      Social History     Socioeconomic History    Marital status: Single   Tobacco Use    Smoking status: Never Smoker    Smokeless tobacco: Never Used   Substance and Sexual Activity    Alcohol use: Not Currently     Alcohol/week: 1.0 standard drink     Types: 1 Glasses of wine per week     Comment: socially    Drug use: Yes     Types: Marijuana     Comment: Occassionally     Sexual activity: Yes     Partners: Male     Social Determinants of Health     Financial Resource Strain: Medium Risk    Difficulty of Paying Living Expenses: Somewhat hard   Food Insecurity: Food Insecurity Present    Worried About Running Out of Food in the Last Year: Sometimes true    Ran Out of Food in the Last Year: Sometimes true   Transportation Needs: No Transportation Needs    Lack of Transportation (Medical):  No    Lack of Transportation (Non-Medical): No   Physical Activity: Insufficiently Active    Days of Exercise per Week: 2 days    Minutes of Exercise per Session: 20 min   Stress: Stress Concern Present    Feeling of Stress : Very much   Social Connections: Moderately Isolated    Frequency of Communication with Friends and Family: More than three times a week    Frequency of Social Gatherings with Friends and Family: Once a week    Attends Jainism Services: More than 4 times per year    Active Member of Clubs or Organizations: No    Attends Club or Organization Meetings: Never    Marital Status:    Housing Stability: High Risk    Unable to Pay for Housing in the Last Year: Yes    Unstable Housing in the Last Year: No     Outpatient Encounter Medications as of 1/12/2022   Medication Sig Dispense Refill    hydrOXYzine (ATARAX) 50 MG tablet Take 1 tablet (50 mg total) by mouth nightly as needed for Itching. 30 tablet 5    norgestimate-ethinyl estradioL (ORTHO-CYCLEN) 0.25-35 mg-mcg per tablet Take 1 tablet by mouth once daily. 28 tablet 11    [DISCONTINUED] EScitalopram oxalate (LEXAPRO) 10 MG tablet Take 1 tablet (10 mg total) by mouth once daily. 30 tablet 5    [DISCONTINUED] hydrOXYzine (ATARAX) 50 MG tablet Take 1 tablet (50 mg total) by mouth nightly as needed for Itching. 30 tablet 5    [DISCONTINUED] pantoprazole (PROTONIX) 40 MG tablet Take 1 tablet (40 mg total) by mouth once daily. 30 tablet 5    EScitalopram oxalate (LEXAPRO) 20 MG tablet Take 1 tablet (20 mg total) by mouth once daily. 30 tablet 5    pantoprazole (PROTONIX) 40 MG tablet Take 1 tablet (40 mg total) by mouth 2 (two) times daily. 60 tablet 1     No facility-administered encounter medications on file as of 1/12/2022.       Review of Systems   Constitutional: Negative for chills and fever.   HENT: Negative for congestion and facial swelling.    Eyes: Negative for discharge and itching.   Respiratory: Negative for  "cough and wheezing.    Cardiovascular: Negative for chest pain and palpitations.   Gastrointestinal: Negative for abdominal pain, nausea and vomiting.   Endocrine: Negative for cold intolerance and heat intolerance.   Genitourinary: Negative for dysuria and flank pain.   Musculoskeletal: Negative for myalgias and neck stiffness.   Skin: Negative for pallor and wound.   Neurological: Negative for facial asymmetry and weakness.   Psychiatric/Behavioral: Negative for agitation and suicidal ideas.       Objective:      /76 (BP Location: Left arm, Patient Position: Sitting, BP Method: Medium (Automatic))   Pulse 72   Temp 98.4 °F (36.9 °C) (Temporal)   Resp 18   Ht 5' 2" (1.575 m)   Wt 61.1 kg (134 lb 12.8 oz)   SpO2 99%   BMI 24.66 kg/m²   Physical Exam  Vitals and nursing note reviewed.   Constitutional:       General: She is not in acute distress.     Appearance: She is well-developed.   HENT:      Head: Normocephalic and atraumatic.      Right Ear: External ear normal.      Left Ear: External ear normal.   Eyes:      Extraocular Movements: EOM normal.      Conjunctiva/sclera: Conjunctivae normal.   Neck:      Thyroid: No thyromegaly.   Cardiovascular:      Rate and Rhythm: Normal rate and regular rhythm.   Pulmonary:      Effort: Pulmonary effort is normal. No respiratory distress.   Abdominal:      General: Bowel sounds are normal. There is no distension.      Palpations: Abdomen is soft.      Tenderness: There is no abdominal tenderness.   Genitourinary:     Comments: deferred  Musculoskeletal:         General: No deformity or edema.      Cervical back: Neck supple.   Lymphadenopathy:      Head:      Right side of head: No submandibular adenopathy.      Left side of head: No submandibular adenopathy.      Cervical: No cervical adenopathy.   Skin:     General: Skin is warm and dry.   Neurological:      Mental Status: She is alert and oriented to person, place, and time.   Psychiatric:         Mood and " Affect: Mood and affect normal.         Behavior: Behavior normal.         Results for orders placed or performed in visit on 01/10/22   Lipid Panel   Result Value Ref Range    Cholesterol 198 120 - 199 mg/dL    Triglycerides 93 30 - 150 mg/dL    HDL 56 40 - 75 mg/dL    LDL Cholesterol 123.4 63.0 - 159.0 mg/dL    HDL/Cholesterol Ratio 28.3 20.0 - 50.0 %    Total Cholesterol/HDL Ratio 3.5 2.0 - 5.0    Non-HDL Cholesterol 142 mg/dL     Assessment:       1. Depression with anxiety    2. Insomnia due to anxiety and fear    3. Gastroesophageal reflux disease without esophagitis        Plan:   Depression with anxiety  -     EScitalopram oxalate (LEXAPRO) 20 MG tablet; Take 1 tablet (20 mg total) by mouth once daily.  Dispense: 30 tablet; Refill: 5  -     hydrOXYzine (ATARAX) 50 MG tablet; Take 1 tablet (50 mg total) by mouth nightly as needed for Itching.  Dispense: 30 tablet; Refill: 5    Insomnia due to anxiety and fear  -     hydrOXYzine (ATARAX) 50 MG tablet; Take 1 tablet (50 mg total) by mouth nightly as needed for Itching.  Dispense: 30 tablet; Refill: 5    Gastroesophageal reflux disease without esophagitis  -     pantoprazole (PROTONIX) 40 MG tablet; Take 1 tablet (40 mg total) by mouth 2 (two) times daily.  Dispense: 60 tablet; Refill: 1    Eliminate trigger food for GERD sxs  Next step will be to go to GI for eval for EGD  lexapro dose increased.  Lipids discussed with pt-WNL.  Treatment options and alternatives were discussed with the patient. Patient was given ample time to ask questions. All questions were answered. Voices understanding and acceptance of this advice. Will call back if any further questions or concerns.  Sally Mackenzie MD

## 2022-01-14 NOTE — PROGRESS NOTES
Inform pt that result is normal. Your cholesterol is normal. If you do not have a follow up appointment for the future, make one now. Thanks

## 2022-01-22 ENCOUNTER — PATIENT MESSAGE (OUTPATIENT)
Dept: PRIMARY CARE CLINIC | Facility: CLINIC | Age: 27
End: 2022-01-22
Payer: MEDICAID

## 2022-03-10 ENCOUNTER — PATIENT MESSAGE (OUTPATIENT)
Dept: PRIMARY CARE CLINIC | Facility: CLINIC | Age: 27
End: 2022-03-10
Payer: MEDICAID

## 2022-03-11 ENCOUNTER — LAB VISIT (OUTPATIENT)
Dept: LAB | Facility: HOSPITAL | Age: 27
End: 2022-03-11
Attending: NURSE PRACTITIONER
Payer: MEDICAID

## 2022-03-11 ENCOUNTER — OFFICE VISIT (OUTPATIENT)
Dept: PRIMARY CARE CLINIC | Facility: CLINIC | Age: 27
End: 2022-03-11
Payer: MEDICAID

## 2022-03-11 VITALS
BODY MASS INDEX: 26.98 KG/M2 | HEIGHT: 62 IN | RESPIRATION RATE: 20 BRPM | HEART RATE: 110 BPM | DIASTOLIC BLOOD PRESSURE: 89 MMHG | WEIGHT: 146.63 LBS | SYSTOLIC BLOOD PRESSURE: 119 MMHG | TEMPERATURE: 99 F | OXYGEN SATURATION: 99 %

## 2022-03-11 DIAGNOSIS — Z34.90 PREGNANCY, UNSPECIFIED GESTATIONAL AGE: ICD-10-CM

## 2022-03-11 DIAGNOSIS — F41.8 DEPRESSION WITH ANXIETY: Primary | ICD-10-CM

## 2022-03-11 DIAGNOSIS — Z51.89 ENCOUNTER FOR MEDICATION ADJUSTMENT: ICD-10-CM

## 2022-03-11 PROCEDURE — 1160F RVW MEDS BY RX/DR IN RCRD: CPT | Mod: CPTII,,, | Performed by: NURSE PRACTITIONER

## 2022-03-11 PROCEDURE — 1159F MED LIST DOCD IN RCRD: CPT | Mod: CPTII,,, | Performed by: NURSE PRACTITIONER

## 2022-03-11 PROCEDURE — 3008F PR BODY MASS INDEX (BMI) DOCUMENTED: ICD-10-PCS | Mod: CPTII,,, | Performed by: NURSE PRACTITIONER

## 2022-03-11 PROCEDURE — 36415 COLL VENOUS BLD VENIPUNCTURE: CPT | Mod: PN | Performed by: NURSE PRACTITIONER

## 2022-03-11 PROCEDURE — 3074F SYST BP LT 130 MM HG: CPT | Mod: CPTII,,, | Performed by: NURSE PRACTITIONER

## 2022-03-11 PROCEDURE — 3079F DIAST BP 80-89 MM HG: CPT | Mod: CPTII,,, | Performed by: NURSE PRACTITIONER

## 2022-03-11 PROCEDURE — 99213 OFFICE O/P EST LOW 20 MIN: CPT | Mod: PBBFAC,PN | Performed by: NURSE PRACTITIONER

## 2022-03-11 PROCEDURE — 84702 CHORIONIC GONADOTROPIN TEST: CPT | Performed by: NURSE PRACTITIONER

## 2022-03-11 PROCEDURE — 3008F BODY MASS INDEX DOCD: CPT | Mod: CPTII,,, | Performed by: NURSE PRACTITIONER

## 2022-03-11 PROCEDURE — 1160F PR REVIEW ALL MEDS BY PRESCRIBER/CLIN PHARMACIST DOCUMENTED: ICD-10-PCS | Mod: CPTII,,, | Performed by: NURSE PRACTITIONER

## 2022-03-11 PROCEDURE — 99213 OFFICE O/P EST LOW 20 MIN: CPT | Mod: S$PBB,,, | Performed by: NURSE PRACTITIONER

## 2022-03-11 PROCEDURE — 1159F PR MEDICATION LIST DOCUMENTED IN MEDICAL RECORD: ICD-10-PCS | Mod: CPTII,,, | Performed by: NURSE PRACTITIONER

## 2022-03-11 PROCEDURE — 99213 PR OFFICE/OUTPT VISIT, EST, LEVL III, 20-29 MIN: ICD-10-PCS | Mod: S$PBB,,, | Performed by: NURSE PRACTITIONER

## 2022-03-11 PROCEDURE — 3079F PR MOST RECENT DIASTOLIC BLOOD PRESSURE 80-89 MM HG: ICD-10-PCS | Mod: CPTII,,, | Performed by: NURSE PRACTITIONER

## 2022-03-11 PROCEDURE — 99999 PR PBB SHADOW E&M-EST. PATIENT-LVL III: CPT | Mod: PBBFAC,,, | Performed by: NURSE PRACTITIONER

## 2022-03-11 PROCEDURE — 3074F PR MOST RECENT SYSTOLIC BLOOD PRESSURE < 130 MM HG: ICD-10-PCS | Mod: CPTII,,, | Performed by: NURSE PRACTITIONER

## 2022-03-11 PROCEDURE — 99999 PR PBB SHADOW E&M-EST. PATIENT-LVL III: ICD-10-PCS | Mod: PBBFAC,,, | Performed by: NURSE PRACTITIONER

## 2022-03-11 NOTE — PROGRESS NOTES
"Subjective:      Patient ID: Gayla Montana is a 27 y.o. female.    Chief Complaint: Medication Refill (Adjust meds due to recent + UPT)    /89   Pulse 110   Temp 98.7 °F (37.1 °C)   Resp 20   Ht 5' 2" (1.575 m)   Wt 66.5 kg (146 lb 9.6 oz)   LMP 10/11/2021 (Approximate)   SpO2 99%   BMI 26.81 kg/m²     Pt presents to clinic requesting medication adjustment due to recent pregnancy. Was taking Lexapro 20 mg daily and stopped approximately two weeks ago after reading the potential teratogenic effects. Pt states she has been using [employer] CBD vape pens and it has helped with her depression. Pt did say she was suicidal prior to beginning Lexapro and it helped drastically but says 20 mg made her feel like a zombie. CBD pen has helped her mood swings and anxiety/depression. Has not seen OB yet. States they will not see her prior to being 8 weeks EGA. Pt's mom is present.      Review of patient's allergies indicates:   Allergen Reactions    Latex, natural rubber Swelling and Rash        Review of Systems   Constitutional: Negative for chills and fever.   HENT: Negative for congestion and sore throat.    Respiratory: Negative for cough and shortness of breath.    Cardiovascular: Negative for chest pain and palpitations.   Gastrointestinal: Negative for nausea and vomiting.   Genitourinary: Negative.    Skin: Negative.    Neurological: Negative for headaches.      Objective:      Physical Exam  Vitals reviewed.   Constitutional:       Appearance: Normal appearance. She is well-developed.   HENT:      Head: Normocephalic and atraumatic.      Right Ear: External ear normal.      Left Ear: External ear normal.      Nose: Nose normal. No mucosal edema or rhinorrhea.      Mouth/Throat:      Mouth: Mucous membranes are moist.      Pharynx: Uvula midline.   Eyes:      General: Lids are normal.      Conjunctiva/sclera: Conjunctivae normal.      Pupils: Pupils are equal, round, and reactive to light. "   Cardiovascular:      Rate and Rhythm: Normal rate and regular rhythm.      Heart sounds: Normal heart sounds. No murmur heard.  Pulmonary:      Effort: Pulmonary effort is normal.      Breath sounds: Normal breath sounds. No decreased breath sounds or wheezing.   Musculoskeletal:         General: Normal range of motion.      Cervical back: Normal range of motion and neck supple.   Lymphadenopathy:      Cervical: No cervical adenopathy.   Skin:     General: Skin is warm and dry.      Capillary Refill: Capillary refill takes less than 2 seconds.      Coloration: Skin is not cyanotic or pale.   Neurological:      Mental Status: She is alert and oriented to person, place, and time.      Cranial Nerves: No cranial nerve deficit.   Psychiatric:         Attention and Perception: Attention normal.         Mood and Affect: Mood normal.         Speech: Speech normal.         Behavior: Behavior normal.         Thought Content: Thought content normal.         Cognition and Memory: Cognition normal.         Assessment:       1. Encounter for medication adjustment    2. Pregnancy, unspecified gestational age        Plan:     Encounter for medication adjustment    Pregnancy, unspecified gestational age  -     HCG, Quantitative; Future; Expected date: 03/11/2022    Advised pt that she should continue her Lexapro  Schedule appointment with OB  Follow up with lab work  Advised she should refrain from CBD until consulting with OB.  Report to the ER for any excessive nausea and vomiting, bleeding, difficulty breathing, suicidal thoughts, or chest pain.

## 2022-03-12 LAB — HCG INTACT+B SERPL-ACNC: NORMAL MIU/ML

## 2022-03-15 ENCOUNTER — OFFICE VISIT (OUTPATIENT)
Dept: OBSTETRICS AND GYNECOLOGY | Facility: CLINIC | Age: 27
End: 2022-03-15
Payer: MEDICAID

## 2022-03-15 ENCOUNTER — LAB VISIT (OUTPATIENT)
Dept: LAB | Facility: HOSPITAL | Age: 27
End: 2022-03-15
Attending: MIDWIFE
Payer: MEDICAID

## 2022-03-15 VITALS
DIASTOLIC BLOOD PRESSURE: 74 MMHG | SYSTOLIC BLOOD PRESSURE: 118 MMHG | WEIGHT: 145.94 LBS | HEIGHT: 62 IN | BODY MASS INDEX: 26.86 KG/M2

## 2022-03-15 DIAGNOSIS — Z32.01 POSITIVE PREGNANCY TEST: ICD-10-CM

## 2022-03-15 DIAGNOSIS — Z87.59 HISTORY OF PRIOR PREGNANCY WITH IUGR NEWBORN: ICD-10-CM

## 2022-03-15 DIAGNOSIS — F32.A DEPRESSION, UNSPECIFIED DEPRESSION TYPE: ICD-10-CM

## 2022-03-15 DIAGNOSIS — O21.9 NAUSEA AND VOMITING IN PREGNANCY: ICD-10-CM

## 2022-03-15 DIAGNOSIS — Z32.01 POSITIVE PREGNANCY TEST: Primary | ICD-10-CM

## 2022-03-15 DIAGNOSIS — Z98.890 H/O LEEP: ICD-10-CM

## 2022-03-15 LAB
ABO + RH BLD: NORMAL
AMPHET+METHAMPHET UR QL: NEGATIVE
BARBITURATES UR QL SCN>200 NG/ML: NEGATIVE
BASOPHILS # BLD AUTO: 0.02 K/UL (ref 0–0.2)
BASOPHILS NFR BLD: 0.3 % (ref 0–1.9)
BENZODIAZ UR QL SCN>200 NG/ML: NEGATIVE
BLD GP AB SCN CELLS X3 SERPL QL: NORMAL
BZE UR QL SCN: NEGATIVE
CANNABINOIDS UR QL SCN: ABNORMAL
CREAT UR-MCNC: 148 MG/DL (ref 15–325)
DIFFERENTIAL METHOD: ABNORMAL
EOSINOPHIL # BLD AUTO: 0.1 K/UL (ref 0–0.5)
EOSINOPHIL NFR BLD: 0.7 % (ref 0–8)
ERYTHROCYTE [DISTWIDTH] IN BLOOD BY AUTOMATED COUNT: 12.5 % (ref 11.5–14.5)
HCT VFR BLD AUTO: 37 % (ref 37–48.5)
HGB BLD-MCNC: 11.9 G/DL (ref 12–16)
IMM GRANULOCYTES # BLD AUTO: 0.02 K/UL (ref 0–0.04)
IMM GRANULOCYTES NFR BLD AUTO: 0.3 % (ref 0–0.5)
LYMPHOCYTES # BLD AUTO: 1.6 K/UL (ref 1–4.8)
LYMPHOCYTES NFR BLD: 22.6 % (ref 18–48)
MCH RBC QN AUTO: 29.5 PG (ref 27–31)
MCHC RBC AUTO-ENTMCNC: 32.2 G/DL (ref 32–36)
MCV RBC AUTO: 92 FL (ref 82–98)
METHADONE UR QL SCN>300 NG/ML: NEGATIVE
MONOCYTES # BLD AUTO: 0.6 K/UL (ref 0.3–1)
MONOCYTES NFR BLD: 8.6 % (ref 4–15)
NEUTROPHILS # BLD AUTO: 4.7 K/UL (ref 1.8–7.7)
NEUTROPHILS NFR BLD: 67.5 % (ref 38–73)
NRBC BLD-RTO: 0 /100 WBC
OPIATES UR QL SCN: NEGATIVE
PCP UR QL SCN>25 NG/ML: NEGATIVE
PLATELET # BLD AUTO: 218 K/UL (ref 150–450)
PMV BLD AUTO: 11.3 FL (ref 9.2–12.9)
RBC # BLD AUTO: 4.03 M/UL (ref 4–5.4)
TOXICOLOGY INFORMATION: ABNORMAL
WBC # BLD AUTO: 6.99 K/UL (ref 3.9–12.7)

## 2022-03-15 PROCEDURE — 86762 RUBELLA ANTIBODY: CPT | Performed by: MIDWIFE

## 2022-03-15 PROCEDURE — 80307 DRUG TEST PRSMV CHEM ANLYZR: CPT | Performed by: MIDWIFE

## 2022-03-15 PROCEDURE — 3008F BODY MASS INDEX DOCD: CPT | Mod: CPTII,,, | Performed by: MIDWIFE

## 2022-03-15 PROCEDURE — 3074F SYST BP LT 130 MM HG: CPT | Mod: CPTII,,, | Performed by: MIDWIFE

## 2022-03-15 PROCEDURE — 87389 HIV-1 AG W/HIV-1&-2 AB AG IA: CPT | Performed by: MIDWIFE

## 2022-03-15 PROCEDURE — 99999 PR PBB SHADOW E&M-EST. PATIENT-LVL III: ICD-10-PCS | Mod: PBBFAC,,, | Performed by: MIDWIFE

## 2022-03-15 PROCEDURE — 87086 URINE CULTURE/COLONY COUNT: CPT | Performed by: MIDWIFE

## 2022-03-15 PROCEDURE — 80074 ACUTE HEPATITIS PANEL: CPT | Performed by: MIDWIFE

## 2022-03-15 PROCEDURE — 86592 SYPHILIS TEST NON-TREP QUAL: CPT | Performed by: MIDWIFE

## 2022-03-15 PROCEDURE — 1159F MED LIST DOCD IN RCRD: CPT | Mod: CPTII,,, | Performed by: MIDWIFE

## 2022-03-15 PROCEDURE — 88175 CYTOPATH C/V AUTO FLUID REDO: CPT | Performed by: PATHOLOGY

## 2022-03-15 PROCEDURE — 88141 PR  CYTOPATH CERV/VAG INTERPRET: ICD-10-PCS | Mod: ,,, | Performed by: PATHOLOGY

## 2022-03-15 PROCEDURE — 99999 PR PBB SHADOW E&M-EST. PATIENT-LVL III: CPT | Mod: PBBFAC,,, | Performed by: MIDWIFE

## 2022-03-15 PROCEDURE — 86850 RBC ANTIBODY SCREEN: CPT | Performed by: MIDWIFE

## 2022-03-15 PROCEDURE — 99203 OFFICE O/P NEW LOW 30 MIN: CPT | Mod: S$PBB,TH,, | Performed by: MIDWIFE

## 2022-03-15 PROCEDURE — 3078F DIAST BP <80 MM HG: CPT | Mod: CPTII,,, | Performed by: MIDWIFE

## 2022-03-15 PROCEDURE — 88141 CYTOPATH C/V INTERPRET: CPT | Mod: ,,, | Performed by: PATHOLOGY

## 2022-03-15 PROCEDURE — 1159F PR MEDICATION LIST DOCUMENTED IN MEDICAL RECORD: ICD-10-PCS | Mod: CPTII,,, | Performed by: MIDWIFE

## 2022-03-15 PROCEDURE — 99213 OFFICE O/P EST LOW 20 MIN: CPT | Mod: PBBFAC,TH | Performed by: MIDWIFE

## 2022-03-15 PROCEDURE — 85025 COMPLETE CBC W/AUTO DIFF WBC: CPT | Performed by: MIDWIFE

## 2022-03-15 PROCEDURE — 87591 N.GONORRHOEAE DNA AMP PROB: CPT | Performed by: MIDWIFE

## 2022-03-15 PROCEDURE — 36415 COLL VENOUS BLD VENIPUNCTURE: CPT | Performed by: MIDWIFE

## 2022-03-15 PROCEDURE — 3008F PR BODY MASS INDEX (BMI) DOCUMENTED: ICD-10-PCS | Mod: CPTII,,, | Performed by: MIDWIFE

## 2022-03-15 PROCEDURE — 99203 PR OFFICE/OUTPT VISIT, NEW, LEVL III, 30-44 MIN: ICD-10-PCS | Mod: S$PBB,TH,, | Performed by: MIDWIFE

## 2022-03-15 PROCEDURE — 87491 CHLMYD TRACH DNA AMP PROBE: CPT | Performed by: MIDWIFE

## 2022-03-15 PROCEDURE — 3074F PR MOST RECENT SYSTOLIC BLOOD PRESSURE < 130 MM HG: ICD-10-PCS | Mod: CPTII,,, | Performed by: MIDWIFE

## 2022-03-15 PROCEDURE — 3078F PR MOST RECENT DIASTOLIC BLOOD PRESSURE < 80 MM HG: ICD-10-PCS | Mod: CPTII,,, | Performed by: MIDWIFE

## 2022-03-15 RX ORDER — ONDANSETRON 4 MG/1
4 TABLET, ORALLY DISINTEGRATING ORAL EVERY 6 HOURS PRN
Qty: 20 TABLET | Refills: 1 | Status: SHIPPED | OUTPATIENT
Start: 2022-03-15 | End: 2022-09-12

## 2022-03-15 RX ORDER — SERTRALINE HYDROCHLORIDE 25 MG/1
25 TABLET, FILM COATED ORAL DAILY
Qty: 30 TABLET | Refills: 2 | Status: SHIPPED | OUTPATIENT
Start: 2022-03-15 | End: 2022-03-28 | Stop reason: SDUPTHER

## 2022-03-15 NOTE — PROGRESS NOTES
CHIEF COMPLAINT:   Patient presents with      Possible Pregnancy        HISTORY OF PRESENT ILLNESS  Gayla Montana 27 y.o.  presents for pregnancy risk assessment.   The patient has no complaints today.  + nausea, no vomiting. No bleeding, mild cramping.  Pregnancy was not planned but is desired.  Partner is supportive of pregnancy, Jose Roberto.  Lives at home with 3 children.  Has 3 cats at home.  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  Denies domestic abuse.  Denies chemical/pesticide/radiation exposure. 3 's, last baby with IUGR. States is struggling with depression, recently stopped Lexapro due to SE.   OB history:      LMP: Patient's last menstrual period was 10/11/2021 (approximate).  EDC: Estimated Date of Delivery: None noted.  EGA: 9w5d pending dating scan       Health Maintenance   Topic Date Due    Pap Smear  2023    TETANUS VACCINE  10/15/2025    Hepatitis C Screening  Completed    Lipid Panel  Completed       Past Medical History:   Diagnosis Date    Abnormal Pap smear of cervix     MITZI 2    Anxiety     Depression     Headache(784.0)     Hemorrhoids     Inflammatory bowel disease        Past Surgical History:   Procedure Laterality Date    ADENOIDECTOMY      APPENDECTOMY      CERVICAL BIOPSY  W/ LOOP ELECTRODE EXCISION      FOOT SURGERY      TONSILLECTOMY         Family History   Problem Relation Age of Onset    Diabetes Mother     Ovarian cancer Mother     Diabetes Father     No Known Problems Brother     No Known Problems Sister     Multiple sclerosis Maternal Aunt     Ovarian cancer Maternal Aunt     Breast cancer Neg Hx     Colon cancer Neg Hx     Thrombosis Neg Hx        Social History     Socioeconomic History    Marital status: Single   Tobacco Use    Smoking status: Never Smoker    Smokeless tobacco: Never Used   Substance and Sexual Activity    Alcohol use: Not Currently     Alcohol/week: 1.0 standard drink     Types: 1 Glasses of wine per week      Comment: socially    Drug use: Yes     Types: Marijuana     Comment: Occassionally     Sexual activity: Yes     Partners: Male     Social Determinants of Health     Financial Resource Strain: Medium Risk    Difficulty of Paying Living Expenses: Somewhat hard   Food Insecurity: Food Insecurity Present    Worried About Running Out of Food in the Last Year: Sometimes true    Ran Out of Food in the Last Year: Sometimes true   Transportation Needs: No Transportation Needs    Lack of Transportation (Medical): No    Lack of Transportation (Non-Medical): No   Physical Activity: Insufficiently Active    Days of Exercise per Week: 2 days    Minutes of Exercise per Session: 20 min   Stress: Stress Concern Present    Feeling of Stress : Very much   Social Connections: Moderately Isolated    Frequency of Communication with Friends and Family: More than three times a week    Frequency of Social Gatherings with Friends and Family: Once a week    Attends Anabaptist Services: More than 4 times per year    Active Member of Clubs or Organizations: No    Attends Club or Organization Meetings: Never    Marital Status:    Housing Stability: High Risk    Unable to Pay for Housing in the Last Year: Yes    Unstable Housing in the Last Year: No       Current Outpatient Medications   Medication Sig Dispense Refill    EScitalopram oxalate (LEXAPRO) 20 MG tablet Take 1 tablet (20 mg total) by mouth once daily. 30 tablet 5    norgestimate-ethinyl estradioL (ORTHO-CYCLEN) 0.25-35 mg-mcg per tablet Take 1 tablet by mouth once daily. (Patient not taking: Reported on 3/11/2022) 28 tablet 11     No current facility-administered medications for this visit.       Review of patient's allergies indicates:   Allergen Reactions    Latex, natural rubber Swelling and Rash         PHYSICAL EXAM   There were no vitals filed for this visit.     PAIN SCALE: 0/10 None    PHYSICAL EXAM    ROS:  GENERAL: No fever, chills, fatigability or  weight loss.  CV: Denies chest pain  PULM: Denies shortness of breath or wheezing.  ABDOMEN: Appetite fine. No weight loss. Denies diarrhea, abdominal pain, hematemesis or blood in stool.  URINARY: No flank pain, dysuria or hematuria.  REPRODUCTIVE: No abnormal vaginal bleeding.       PE:   APPEARANCE: Well nourished, well developed, in no acute distress  CHEST: Clear to auscultation bilaterally  CV: Regular rate and rhythm  BREASTS: Symmetrical, no skin changes or visible lesions. No palpable masses, nipple discharge or adenopathy bilaterally.  ABDOMEN: Soft. No tenderness or masses. No hepatosplenomegaly. No hernias  PELVIC:   VULVA: No lesions. Normal female genitalia.  URETHRAL MEATUS: Normal size and location, no lesions, no prolapse.  URETHRA: No masses, tenderness, prolapse or scarring.  VAGINA: Moist and well rugated, no discharge, no significant cystocele or rectocele.  CERVIX: No lesions, normal diameter, no stenosis, no cervical motion tenderness. PAP and Gen probes collected.   UTERUS: 10 weeks size, regular shape, mobile, non-tender, normal position, good support.  ADNEXA: No masses, tenderness or CDS nodularity.  ANUS PERINEUM: No lesions, no relaxation, no external hemorrhoids.     UPT +    A/P:     -      Patient was counseled today on A.C.S. Pap guidelines and recommendations for yearly pelvic exams, mammograms and monthly self breast exams; to see her PCP for other health maintenance and pregnancy. PAP today due to Hx of abnormal smear.   -      Patient's medications and medical history reviewed with patient and implications in pregnancy. Current meds: PNV. Rx Zofran and Zoloft. Suicide precautions reviewed.   -      Pregnancy course discussed and 'AtoZ' book given. Patient was counseled on proper weight gain based on the [place] of Medicine's recommendations based on her pre-pregnancy weight. Discussed foods to avoid in pregnancy (i.e. sushi, fish that are high in mercury, deli meat, and  unpasteurized cheeses). Discussed prenatal vitamin options (i.e. stool softener, DHA). Discussed potential medical problems in pregnancy.  -     Discussed risk of Toxoplasmosis transmission from pets and reviewed risk reduction techniques. States wears mask, because no one else can change litter boxes. Will start wearing gloves and mask.  -     Pt was counseled on exercise in pregnancy and weight gain recommendations. 25lb goal set.  -     Advised Covid vaccination to prevent complications in pregnancy. Pt does not vaccinate.   -     Oriented to practice including CNM collaboration. Patient had 3 babies with us, familiar with practice.  -     Follow-up initial OB, with labs and u/s.   -     Interested in NCB/tub.   -     Hx of baby with IUGR-growth scans @ 32/36 weeks.  -     Hx of LEEP-cervical length with anatomy scan

## 2022-03-16 LAB
BACTERIA UR CULT: NO GROWTH
HAV IGM SERPL QL IA: NEGATIVE
HBV CORE IGM SERPL QL IA: NEGATIVE
HBV SURFACE AG SERPL QL IA: NEGATIVE
HCV AB SERPL QL IA: NEGATIVE
HIV 1+2 AB+HIV1 P24 AG SERPL QL IA: NEGATIVE
RPR SER QL: NORMAL
RUBV IGG SER-ACNC: 50.7 IU/ML
RUBV IGG SER-IMP: REACTIVE

## 2022-03-17 LAB
C TRACH DNA SPEC QL NAA+PROBE: NOT DETECTED
N GONORRHOEA DNA SPEC QL NAA+PROBE: NOT DETECTED

## 2022-03-18 PROBLEM — F12.90 MARIJUANA USE: Status: ACTIVE | Noted: 2022-03-18

## 2022-03-23 LAB
FINAL PATHOLOGIC DIAGNOSIS: ABNORMAL
Lab: ABNORMAL

## 2022-03-26 PROBLEM — O23.599 BACTERIAL VAGINOSIS IN PREGNANCY: Status: ACTIVE | Noted: 2022-03-26

## 2022-03-26 PROBLEM — B96.89 BACTERIAL VAGINOSIS IN PREGNANCY: Status: ACTIVE | Noted: 2022-03-26

## 2022-03-26 PROBLEM — R87.612 LGSIL ON PAP SMEAR OF CERVIX: Status: ACTIVE | Noted: 2022-03-26

## 2022-03-28 ENCOUNTER — PROCEDURE VISIT (OUTPATIENT)
Dept: OBSTETRICS AND GYNECOLOGY | Facility: CLINIC | Age: 27
End: 2022-03-28
Payer: MEDICAID

## 2022-03-28 ENCOUNTER — INITIAL PRENATAL (OUTPATIENT)
Dept: OBSTETRICS AND GYNECOLOGY | Facility: CLINIC | Age: 27
End: 2022-03-28
Payer: MEDICAID

## 2022-03-28 VITALS — SYSTOLIC BLOOD PRESSURE: 118 MMHG | DIASTOLIC BLOOD PRESSURE: 60 MMHG | BODY MASS INDEX: 25.97 KG/M2 | WEIGHT: 142 LBS

## 2022-03-28 DIAGNOSIS — F32.A DEPRESSION, UNSPECIFIED DEPRESSION TYPE: ICD-10-CM

## 2022-03-28 DIAGNOSIS — Z34.80 SUPERVISION OF OTHER NORMAL PREGNANCY: ICD-10-CM

## 2022-03-28 DIAGNOSIS — Z32.01 POSITIVE PREGNANCY TEST: ICD-10-CM

## 2022-03-28 DIAGNOSIS — R87.612 LGSIL ON PAP SMEAR OF CERVIX: ICD-10-CM

## 2022-03-28 PROCEDURE — 99999 PR PBB SHADOW E&M-EST. PATIENT-LVL III: CPT | Mod: PBBFAC,,, | Performed by: ADVANCED PRACTICE MIDWIFE

## 2022-03-28 PROCEDURE — 76801 OB US < 14 WKS SINGLE FETUS: CPT | Mod: PBBFAC,PO | Performed by: OBSTETRICS & GYNECOLOGY

## 2022-03-28 PROCEDURE — 99213 OFFICE O/P EST LOW 20 MIN: CPT | Mod: 25,TH,S$PBB, | Performed by: ADVANCED PRACTICE MIDWIFE

## 2022-03-28 PROCEDURE — 99999 PR PBB SHADOW E&M-EST. PATIENT-LVL III: ICD-10-PCS | Mod: PBBFAC,,, | Performed by: ADVANCED PRACTICE MIDWIFE

## 2022-03-28 PROCEDURE — 76801 US OB/GYN PROCEDURE (VIEWPOINT): ICD-10-PCS | Mod: 26,S$PBB,, | Performed by: OBSTETRICS & GYNECOLOGY

## 2022-03-28 PROCEDURE — 99213 PR OFFICE/OUTPT VISIT, EST, LEVL III, 20-29 MIN: ICD-10-PCS | Mod: 25,TH,S$PBB, | Performed by: ADVANCED PRACTICE MIDWIFE

## 2022-03-28 PROCEDURE — 99213 OFFICE O/P EST LOW 20 MIN: CPT | Mod: PBBFAC,TH,PO | Performed by: ADVANCED PRACTICE MIDWIFE

## 2022-03-28 RX ORDER — SERTRALINE HYDROCHLORIDE 25 MG/1
25 TABLET, FILM COATED ORAL DAILY
Qty: 30 TABLET | Refills: 11 | Status: SHIPPED | OUTPATIENT
Start: 2022-03-28 | End: 2022-08-01

## 2022-03-28 RX ORDER — PROMETHAZINE HYDROCHLORIDE 12.5 MG/1
12.5 TABLET ORAL 4 TIMES DAILY
Qty: 30 TABLET | Refills: 2 | Status: SHIPPED | OUTPATIENT
Start: 2022-03-28 | End: 2022-05-30

## 2022-03-28 NOTE — PATIENT INSTRUCTIONS
Patient Education       Pregnancy - The Third Month   About this topic   It is important for you to learn how to take care of yourself to help you have a healthy baby and safe delivery. It is good to have health care throughout your pregnancy.  The third month of your pregnancy starts around week 10 and lasts through week 13. By knowing how far along you are, you can learn what is normal for this stage of your pregnancy and plan for what is next.  General   Your Body   During the third month of your pregnancy, here are some things you can expect.  You may:  Have less morning sickness or upset stomach. This is because the placenta has taken over some of the hormone production for the baby.  Start to gain weight. It is normal to gain about 1 to 3 pounds (.5 to 1.5 kg) in your first 3 months. Most moms gain about 25 to 35 pounds (11 to 16 kg) during their pregnancy. Talk to your doctor about how much weight you should gain.  Have glowing skin because of extra blood flow and hormones  Notice a dark line on your belly. This is normal. You may also be able to feel your uterus in your belly as it starts to grow.  Have more trouble sleeping at night  Have an increase in appetite  Have trouble breathing or have an increase in congestion  Have trouble with bowel movements  Be at a higher risk for getting a yeast infection because of the change in pH of your vagina  Have frequent mood swings.  Your babys growth and development:  Your baby's organs are formed and are starting to work together. Eyelids are closed and will stay that way to protect their eyes as they grow.  Their bones are growing. Your baby is able to open and close their mouth and starts to suck their thumb.  Your baby's genitals and reproductive organs are developing, but it is too soon to tell if your baby is a boy or girl with an ultrasound.  The heartbeat is easy to hear with a special tool at the doctors office.  Your baby is about 3 inches (8 cm) long  and weighs about 1 ounce (30 gm). Your baby is about the size of a lemon.  Things to Think About   Avoid alcohol, drugs, tobacco products, and second hand smoke  Check with your doctor before taking any kind of drugs. Continue to take your vitamin with folic acid.  Eat small meals and drink more water to help with heartburn. Also go for a walk after eating and avoid spicy, fried foods.  You may need to switch to another size or style of clothes as your baby grows. Be comfortable and know that the baby is well cushioned inside of you.  Stay healthy by eating good foods, exercising, and getting enough rest.  When do I need to call the doctor?   Not gaining any weight or losing weight  Belly pain or cramps that keeps you from eating or sleeping  Continuing to have too much upset stomach and throwing up  Period-like bleeding  Where can I learn more?   American Academy of Family Physicians  https://familydoctor.org/changes-in-your-body-during-pregnancy-first-trimester/   Better Health  https://www.betterhealth.[place].gov.[place]/health/HealthyLiving/pregnancy-stages-and-changes   Last Reviewed Date   2020-04-20  Consumer Information Use and Disclaimer   This information is not specific medical advice and does not replace information you receive from your health care provider. This is only a brief summary of general information. It does NOT include all information about conditions, illnesses, injuries, tests, procedures, treatments, therapies, discharge instructions or life-style choices that may apply to you. You must talk with your health care provider for complete information about your health and treatment options. This information should not be used to decide whether or not to accept your health care providers advice, instructions or recommendations. Only your health care provider has the knowledge and training to provide advice that is right for you.  Copyright   Copyright © 2021 [employer], [employer] and its affiliates and/or  licensors. All rights reserved.

## 2022-03-29 NOTE — PROGRESS NOTES
2022-new OB today.  Consent signed  27 y.o. female  at 8w2d   denies VB or cramping    Doing well  Ultrasound reveals single viable intrauterine pregnancy 8 weeks and 1 day yielding EDC 2022-reassuring  History of anxiety depression discussed extensively.  Had start Lexapro.  Be concluded that she would restart Zoloft 25 mg and evaluate at on going visit  First trimester s/s improving:   Reviewed prenatal labs-unremarkable other than Pap which was low-grade squamous intraepithelial lesion, history of LEEP procedure.  Will need colposcopy-to be scheduled  Genetic testing discussed and will consider      Reviewed warning signs, pregnancy precautions and how/when to call.  RTC x 4 wks, call or present sooner prn.     I spent a total of 20 minutes on the day of the visit.This includes face to face time and non-face to face time preparing to see the patient (eg, review of tests), Obtaining and/or reviewing separately obtained history, Documenting clinical information in the electronic or other health record, Independently interpreting resultsand communicating results to the patient/family/caregiver, or Care coordination.             Risk assessment, Gris Jackson  CHIEF COMPLAINT:   Patient presents with      Possible Pregnancy        HISTORY OF PRESENT ILLNESS  Gayla Montana 27 y.o.  presents for pregnancy risk assessment.   The patient has no complaints today.  + nausea, no vomiting. No bleeding, mild cramping.  Pregnancy was not planned but is desired.  Partner is supportive of pregnancy, Jose Roberto.  Lives at home with 3 children.  Has 3 cats at home.  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  Denies domestic abuse.  Denies chemical/pesticide/radiation exposure. 3 's, last baby with IUGR. States is struggling with depression, recently stopped Lexapro due to SE.   OB history:      LMP: Patient's last menstrual period was 2022 (approximate).  EDC: Estimated Date of Delivery: None  noted.  EGA: 9w5d pending dating scan       Health Maintenance   Topic Date Due    Pap Smear  03/15/2025    TETANUS VACCINE  10/15/2025    Hepatitis C Screening  Completed    Lipid Panel  Completed       Past Medical History:   Diagnosis Date    Abnormal Pap smear of cervix     MITZI 2    Anemia     Anxiety     Depression     Depression 3/15/2022    Headache(784.0)     Hemorrhoids     Inflammatory bowel disease     Postpartum depression        Past Surgical History:   Procedure Laterality Date    ADENOIDECTOMY      APPENDECTOMY      CERVICAL BIOPSY  W/ LOOP ELECTRODE EXCISION      FOOT SURGERY      TONSILLECTOMY         Family History   Problem Relation Age of Onset    Diabetes Mother     Ovarian cancer Mother     Diabetes Father     No Known Problems Brother     No Known Problems Sister     Multiple sclerosis Maternal Aunt     Ovarian cancer Maternal Aunt     Breast cancer Neg Hx     Colon cancer Neg Hx     Thrombosis Neg Hx        Social History     Socioeconomic History    Marital status: Single   Tobacco Use    Smoking status: Never Smoker    Smokeless tobacco: Never Used   Substance and Sexual Activity    Alcohol use: Not Currently     Alcohol/week: 1.0 standard drink     Types: 1 Glasses of wine per week     Comment: socially    Drug use: Yes     Types: Marijuana     Comment: Occassionally     Sexual activity: Yes     Partners: Male     Social Determinants of Health     Financial Resource Strain: Medium Risk    Difficulty of Paying Living Expenses: Somewhat hard   Food Insecurity: Food Insecurity Present    Worried About Running Out of Food in the Last Year: Sometimes true    Ran Out of Food in the Last Year: Sometimes true   Transportation Needs: No Transportation Needs    Lack of Transportation (Medical): No    Lack of Transportation (Non-Medical): No   Physical Activity: Insufficiently Active    Days of Exercise per Week: 2 days    Minutes of Exercise per Session: 20  min   Stress: Stress Concern Present    Feeling of Stress : Very much   Social Connections: Moderately Isolated    Frequency of Communication with Friends and Family: More than three times a week    Frequency of Social Gatherings with Friends and Family: Once a week    Attends Episcopalian Services: More than 4 times per year    Active Member of Clubs or Organizations: No    Attends Club or Organization Meetings: Never    Marital Status:    Housing Stability: High Risk    Unable to Pay for Housing in the Last Year: Yes    Unstable Housing in the Last Year: No       Current Outpatient Medications   Medication Sig Dispense Refill    ondansetron (ZOFRAN-ODT) 4 MG TbDL Take 1 tablet (4 mg total) by mouth every 6 (six) hours as needed (nausea/vomiting). 20 tablet 1    PNV no.95/ferrous fum/folic ac (PRENATAL ORAL) Take by mouth.      promethazine (PHENERGAN) 12.5 MG Tab Take 1 tablet (12.5 mg total) by mouth 4 (four) times daily. 30 tablet 2    sertraline (ZOLOFT) 25 MG tablet Take 1 tablet (25 mg total) by mouth once daily. 30 tablet 11     No current facility-administered medications for this visit.       Review of patient's allergies indicates:   Allergen Reactions    Latex, natural rubber Swelling and Rash         PHYSICAL EXAM   Vitals:    03/28/22 1255   BP: 118/60        PAIN SCALE: 0/10 None    PHYSICAL EXAM    ROS:  GENERAL: No fever, chills, fatigability or weight loss.  CV: Denies chest pain  PULM: Denies shortness of breath or wheezing.  ABDOMEN: Appetite fine. No weight loss. Denies diarrhea, abdominal pain, hematemesis or blood in stool.  URINARY: No flank pain, dysuria or hematuria.  REPRODUCTIVE: No abnormal vaginal bleeding.       PE:   APPEARANCE: Well nourished, well developed, in no acute distress  CHEST: Clear to auscultation bilaterally  CV: Regular rate and rhythm  BREASTS: Symmetrical, no skin changes or visible lesions. No palpable masses, nipple discharge or adenopathy  bilaterally.  ABDOMEN: Soft. No tenderness or masses. No hepatosplenomegaly. No hernias  PELVIC:   VULVA: No lesions. Normal female genitalia.  URETHRAL MEATUS: Normal size and location, no lesions, no prolapse.  URETHRA: No masses, tenderness, prolapse or scarring.  VAGINA: Moist and well rugated, no discharge, no significant cystocele or rectocele.  CERVIX: No lesions, normal diameter, no stenosis, no cervical motion tenderness. PAP and Gen probes collected.   UTERUS: 10 weeks size, regular shape, mobile, non-tender, normal position, good support.  ADNEXA: No masses, tenderness or CDS nodularity.  ANUS PERINEUM: No lesions, no relaxation, no external hemorrhoids.     UPT +    A/P:     -      Patient was counseled today on A.C.S. Pap guidelines and recommendations for yearly pelvic exams, mammograms and monthly self breast exams; to see her PCP for other health maintenance and pregnancy. PAP today due to Hx of abnormal smear.   -      Patient's medications and medical history reviewed with patient and implications in pregnancy. Current meds: PNV. Rx Zofran and Zoloft. Suicide precautions reviewed.   -      Pregnancy course discussed and 'AtoZ' book given. Patient was counseled on proper weight gain based on the [place] of Medicine's recommendations based on her pre-pregnancy weight. Discussed foods to avoid in pregnancy (i.e. sushi, fish that are high in mercury, deli meat, and unpasteurized cheeses). Discussed prenatal vitamin options (i.e. stool softener, DHA). Discussed potential medical problems in pregnancy.  -     Discussed risk of Toxoplasmosis transmission from pets and reviewed risk reduction techniques. States wears mask, because no one else can change litter boxes. Will start wearing gloves and mask.  -     Pt was counseled on exercise in pregnancy and weight gain recommendations. 25lb goal set.  -     Advised Covid vaccination to prevent complications in pregnancy. Pt does not vaccinate.   -      Oriented to practice including CNM collaboration. Patient had 3 babies with us, familiar with practice.  -     Follow-up initial OB, with labs and u/s.   -     Interested in NCB/tub.   -     Hx of baby with IUGR-growth scans @ 32/36 weeks.  -     Hx of LEEP-cervical length with anatomy scan

## 2022-03-30 ENCOUNTER — PATIENT MESSAGE (OUTPATIENT)
Dept: OBSTETRICS AND GYNECOLOGY | Facility: CLINIC | Age: 27
End: 2022-03-30
Payer: MEDICAID

## 2022-05-02 ENCOUNTER — ROUTINE PRENATAL (OUTPATIENT)
Dept: OBSTETRICS AND GYNECOLOGY | Facility: CLINIC | Age: 27
End: 2022-05-02
Payer: MEDICAID

## 2022-05-02 VITALS
SYSTOLIC BLOOD PRESSURE: 110 MMHG | WEIGHT: 143.06 LBS | BODY MASS INDEX: 26.17 KG/M2 | DIASTOLIC BLOOD PRESSURE: 68 MMHG

## 2022-05-02 DIAGNOSIS — R87.612 LGSIL ON PAP SMEAR OF CERVIX: ICD-10-CM

## 2022-05-02 DIAGNOSIS — Z87.59 HISTORY OF PRIOR PREGNANCY WITH IUGR NEWBORN: ICD-10-CM

## 2022-05-02 DIAGNOSIS — Z34.80 SUPERVISION OF OTHER NORMAL PREGNANCY: Primary | ICD-10-CM

## 2022-05-02 PROCEDURE — 99213 OFFICE O/P EST LOW 20 MIN: CPT | Mod: TH,S$PBB,, | Performed by: ADVANCED PRACTICE MIDWIFE

## 2022-05-02 PROCEDURE — 99999 PR PBB SHADOW E&M-EST. PATIENT-LVL III: CPT | Mod: PBBFAC,,, | Performed by: ADVANCED PRACTICE MIDWIFE

## 2022-05-02 PROCEDURE — 99999 PR PBB SHADOW E&M-EST. PATIENT-LVL III: ICD-10-PCS | Mod: PBBFAC,,, | Performed by: ADVANCED PRACTICE MIDWIFE

## 2022-05-02 PROCEDURE — 99213 PR OFFICE/OUTPT VISIT, EST, LEVL III, 20-29 MIN: ICD-10-PCS | Mod: TH,S$PBB,, | Performed by: ADVANCED PRACTICE MIDWIFE

## 2022-05-02 PROCEDURE — 99213 OFFICE O/P EST LOW 20 MIN: CPT | Mod: PBBFAC,TH,PO | Performed by: ADVANCED PRACTICE MIDWIFE

## 2022-05-02 NOTE — PROGRESS NOTES
27 y.o. female  at 13w1d   denies VB or cramping    Doing well without concerns -taking Lexapro and Zoloft and finding them helpful    LGSIL-colpo scheduled for 510 22 questions answered today  First trimester s/s improving:     TW lbs   Reviewed prenatal labs  Genetic testing desires quad next visit      Reviewed warning signs, pregnancy precautions and how/when to call.  RTC x 1 wks, for colposcopy and 4 weeks for visit call or present sooner prn.     I spent a total of 20 minutes on the day of the visit.This includes face to face time and non-face to face time preparing to see the patient (eg, review of tests), Obtaining and/or reviewing separately obtained history, Documenting clinical information in the electronic or other health record, Independently interpreting resultsand communicating results to the patient/family/caregiver, or Care coordination.

## 2022-05-10 ENCOUNTER — PROCEDURE VISIT (OUTPATIENT)
Dept: OBSTETRICS AND GYNECOLOGY | Facility: CLINIC | Age: 27
End: 2022-05-10
Payer: MEDICAID

## 2022-05-10 VITALS
DIASTOLIC BLOOD PRESSURE: 70 MMHG | BODY MASS INDEX: 26.65 KG/M2 | HEIGHT: 62 IN | WEIGHT: 144.81 LBS | SYSTOLIC BLOOD PRESSURE: 112 MMHG

## 2022-05-10 DIAGNOSIS — R87.612 LGSIL ON PAP SMEAR OF CERVIX: Primary | ICD-10-CM

## 2022-05-10 PROCEDURE — 57452 EXAM OF CERVIX W/SCOPE: CPT | Mod: PBBFAC,PO | Performed by: OBSTETRICS & GYNECOLOGY

## 2022-05-10 PROCEDURE — 57452 COLPOSCOPY-TODAY: ICD-10-PCS | Mod: S$PBB,,, | Performed by: OBSTETRICS & GYNECOLOGY

## 2022-05-10 RX ORDER — PANTOPRAZOLE SODIUM 40 MG/1
40 TABLET, DELAYED RELEASE ORAL DAILY
Status: ON HOLD | COMMUNITY
Start: 2022-05-09 | End: 2022-10-26

## 2022-05-10 NOTE — PROCEDURES
Colposcopy-Today    Date/Time: 5/10/2022 10:15 AM  Performed by: Hannah Hawthorne MD  Authorized by: Hannah Hawthorne MD     Consent Done?:  Yes (Written)  Timeout:Immediately prior to procedure a time out was called to verify the correct patient, procedure, equipment, support staff and site/side marked as required  Assistants?: Yes    List of assistants:  Juanita Arguello LPN    Colposcopy Site:  Cervix  Position:  Supine  Acrowhite Lesion: No    Atypical Vessels: No    Transformation Zone Adequate?: Yes    Biopsy?: No    ECC Performed?: No    LEEP Performed?: No    Estimated blood loss (cc):  0   Patient tolerated the procedure well with no immediate complications.   Post-operative instructions were provided for the patient.   Patient was discharged and will follow up if any complications occur     Colposcopy adequate and normal.  Needs pap 6 weeks postpartum.

## 2022-05-30 ENCOUNTER — ROUTINE PRENATAL (OUTPATIENT)
Dept: OBSTETRICS AND GYNECOLOGY | Facility: CLINIC | Age: 27
End: 2022-05-30
Payer: MEDICAID

## 2022-05-30 ENCOUNTER — LAB VISIT (OUTPATIENT)
Dept: LAB | Facility: HOSPITAL | Age: 27
End: 2022-05-30
Attending: ADVANCED PRACTICE MIDWIFE
Payer: MEDICAID

## 2022-05-30 VITALS — BODY MASS INDEX: 27.02 KG/M2 | SYSTOLIC BLOOD PRESSURE: 98 MMHG | WEIGHT: 147.69 LBS | DIASTOLIC BLOOD PRESSURE: 58 MMHG

## 2022-05-30 DIAGNOSIS — Z34.80 SUPERVISION OF OTHER NORMAL PREGNANCY: Primary | ICD-10-CM

## 2022-05-30 DIAGNOSIS — R87.612 LGSIL ON PAP SMEAR OF CERVIX: ICD-10-CM

## 2022-05-30 DIAGNOSIS — Z87.59 HISTORY OF PRIOR PREGNANCY WITH IUGR NEWBORN: ICD-10-CM

## 2022-05-30 DIAGNOSIS — Z34.80 SUPERVISION OF OTHER NORMAL PREGNANCY: ICD-10-CM

## 2022-05-30 PROCEDURE — 99213 OFFICE O/P EST LOW 20 MIN: CPT | Mod: PBBFAC,TH,PO | Performed by: ADVANCED PRACTICE MIDWIFE

## 2022-05-30 PROCEDURE — 99999 PR PBB SHADOW E&M-EST. PATIENT-LVL III: ICD-10-PCS | Mod: PBBFAC,,, | Performed by: ADVANCED PRACTICE MIDWIFE

## 2022-05-30 PROCEDURE — 81511 FTL CGEN ABNOR FOUR ANAL: CPT | Performed by: ADVANCED PRACTICE MIDWIFE

## 2022-05-30 PROCEDURE — 36415 COLL VENOUS BLD VENIPUNCTURE: CPT | Mod: PO | Performed by: ADVANCED PRACTICE MIDWIFE

## 2022-05-30 PROCEDURE — 99213 PR OFFICE/OUTPT VISIT, EST, LEVL III, 20-29 MIN: ICD-10-PCS | Mod: TH,S$PBB,, | Performed by: ADVANCED PRACTICE MIDWIFE

## 2022-05-30 PROCEDURE — 99213 OFFICE O/P EST LOW 20 MIN: CPT | Mod: TH,S$PBB,, | Performed by: ADVANCED PRACTICE MIDWIFE

## 2022-05-30 PROCEDURE — 99999 PR PBB SHADOW E&M-EST. PATIENT-LVL III: CPT | Mod: PBBFAC,,, | Performed by: ADVANCED PRACTICE MIDWIFE

## 2022-05-30 NOTE — PATIENT INSTRUCTIONS
Patient Education       Pregnancy - The Fourth Month   About this topic   It is important for you to learn how to take care of yourself to help you have a healthy baby and safe delivery. It is good to have health care throughout your pregnancy.  The fourth month of your pregnancy starts around week 14 and lasts through week 18. By knowing how far along you are, you can learn what is normal for this stage of your pregnancy and plan for what is next.  General   Growth and Development   During the fourth month of your pregnancy, here are some things you can expect.  You may:  Start to show that you are pregnant. It is normal to gain about 5 to 10 pounds (2.3 to 4.5 kg) total in your first 4 months.  Have heartburn  Feel like you have trouble paying attention to things  Have less nausea  Notice your breasts are growing and the veins are easier to see on them  Have swollen veins in your legs and feet, more nosebleeds, or bleeding when you brush your teeth. These are all because of the extra blood your body has while you are pregnant.  Notice more swelling in your hands and feet  Start to feel fluttering when you are lying or sitting quietly. This is your baby kicking.  Have pain in your sides with sudden movement. This is normal and happens because the ligaments in your belly are stretching.  Have a little more energy. Exercise is good for you, but check with your doctor before starting new exercises.  Most of the time it is safe for you to have sex while you are pregnant. It wont hurt the baby.  Your babys:  Skin is very thin and you can easily see blood vessels through it. Your baby is covered with lots of fine hair to protect their skin.  Bones are starting to harden. Your baby is able to frown, smile, stretch, and move.  Practicing breathing movements while inside of your womb  About 6 inches (16 cm) long and weighs about 7 ounces (200 gm). Your baby is about the size of an orange.  Things to Think About   Avoid  alcohol, drugs, tobacco products, and second hand smoke  Check with your doctor before taking any kind of drugs. Continue to take your vitamin with folic acid.  Avoid cleaning cat litter boxes. This can cause a disease that causes birth defects in your baby.  Amniocentesis and other prenatal screening tests may be done this month.  Try sleeping on your side. Use a pillow between your legs. Avoid sleeping on your back. This will help with the blood flow to your baby.  Change positions and get up slowly. Your heart has to work hard to cope with all of the extra blood volume.  Where will you take your baby for care after they are born? This is a good time to find a doctor for your baby.  Eat fresh fruits and foods with a lot of fiber to help with hard stools.  Drink at least 6 to 8 glasses of water each day.  When do I need to call the doctor?   Vaginal bleeding  Leaking of fluid from your vagina  Problems with constipation  Belly pain  Any illness or infection  Severe headaches or headaches that wont go away  Where can I learn more?   Better Health  https://www.betterhealth.[place]/health/HealthyLiving/pregnancy-stages-and-changes   Family Doctor  https://familydoctor.org/changes-in-your-body-during-pregnancy-first-trimester/   Last Reviewed Date   2020-04-20  Consumer Information Use and Disclaimer   This information is not specific medical advice and does not replace information you receive from your health care provider. This is only a brief summary of general information. It does NOT include all information about conditions, illnesses, injuries, tests, procedures, treatments, therapies, discharge instructions or life-style choices that may apply to you. You must talk with your health care provider for complete information about your health and treatment options. This information should not be used to decide whether or not to accept your health care providers advice, instructions or recommendations. Only your  health care provider has the knowledge and training to provide advice that is right for you.  Copyright   Copyright © 2021 [employer] and its affiliates and/or licensors. All rights reserved.

## 2022-05-30 NOTE — PROGRESS NOTES
27 y.o. female  at 17w1d   Is feeling flutters, denies VB, LOF or cramping    Doing well without concerns     TW lbs   Reviewed prenatal labs  Genetic testing quad test today paperwork completed an order placed  Anatomy scan ordered  Reviewed warning signs, pregnancy precautions and how/when to call.  RTC x 3 wks with anatomy ultrasound, call or present sooner prn.     I spent a total of 20 minutes on the day of the visit.This includes face to face time and non-face to face time preparing to see the patient (eg, review of tests), Obtaining and/or reviewing separately obtained history, Documenting clinical information in the electronic or other health record, Independently interpreting resultsand communicating results to the patient/family/caregiver, or Care coordination.

## 2022-06-02 LAB
# FETUSES US: NORMAL
2ND TRIMESTER 4 SCREEN PNL SERPL: NEGATIVE
2ND TRIMESTER 4 SCREEN SERPL-IMP: NORMAL
AFP MOM SERPL: 1.05
AFP SERPL-MCNC: 42.3 NG/ML
AGE AT DELIVERY: 26
B-HCG MOM SERPL: 1.13
B-HCG SERPL-ACNC: 34.3 IU/ML
FET TS 21 RISK FROM MAT AGE: NORMAL
GA (DAYS): 1 D
GA (WEEKS): 17 WK
GA METHOD: NORMAL
IDDM PATIENT QL: NORMAL
INHIBIN A MOM SERPL: 1.62
INHIBIN A SERPL-MCNC: 239 PG/ML
SMOKING STATUS FTND: NO
TS 18 RISK FETUS: NORMAL
TS 21 RISK FETUS: NORMAL
U ESTRIOL MOM SERPL: 0.99
U ESTRIOL SERPL-MCNC: 1.32 NG/ML

## 2022-06-20 ENCOUNTER — PROCEDURE VISIT (OUTPATIENT)
Dept: OBSTETRICS AND GYNECOLOGY | Facility: CLINIC | Age: 27
End: 2022-06-20
Payer: MEDICAID

## 2022-06-20 ENCOUNTER — ROUTINE PRENATAL (OUTPATIENT)
Dept: OBSTETRICS AND GYNECOLOGY | Facility: CLINIC | Age: 27
End: 2022-06-20
Payer: MEDICAID

## 2022-06-20 VITALS
DIASTOLIC BLOOD PRESSURE: 66 MMHG | SYSTOLIC BLOOD PRESSURE: 128 MMHG | BODY MASS INDEX: 27.14 KG/M2 | WEIGHT: 148.38 LBS

## 2022-06-20 DIAGNOSIS — Z34.80 SUPERVISION OF OTHER NORMAL PREGNANCY: ICD-10-CM

## 2022-06-20 DIAGNOSIS — Z87.59 HISTORY OF PRIOR PREGNANCY WITH IUGR NEWBORN: ICD-10-CM

## 2022-06-20 DIAGNOSIS — Z34.80 SUPERVISION OF OTHER NORMAL PREGNANCY: Primary | ICD-10-CM

## 2022-06-20 PROCEDURE — 99999 PR PBB SHADOW E&M-EST. PATIENT-LVL III: CPT | Mod: PBBFAC,,, | Performed by: ADVANCED PRACTICE MIDWIFE

## 2022-06-20 PROCEDURE — 99213 PR OFFICE/OUTPT VISIT, EST, LEVL III, 20-29 MIN: ICD-10-PCS | Mod: TH,S$PBB,, | Performed by: ADVANCED PRACTICE MIDWIFE

## 2022-06-20 PROCEDURE — 99213 OFFICE O/P EST LOW 20 MIN: CPT | Mod: TH,S$PBB,, | Performed by: ADVANCED PRACTICE MIDWIFE

## 2022-06-20 PROCEDURE — 99999 PR PBB SHADOW E&M-EST. PATIENT-LVL III: ICD-10-PCS | Mod: PBBFAC,,, | Performed by: ADVANCED PRACTICE MIDWIFE

## 2022-06-20 PROCEDURE — 76805 OB US >/= 14 WKS SNGL FETUS: CPT | Mod: PBBFAC,PO | Performed by: OBSTETRICS & GYNECOLOGY

## 2022-06-20 PROCEDURE — 76805 US OB/GYN PROCEDURE (VIEWPOINT): ICD-10-PCS | Mod: 26,S$PBB,, | Performed by: OBSTETRICS & GYNECOLOGY

## 2022-06-20 PROCEDURE — 99213 OFFICE O/P EST LOW 20 MIN: CPT | Mod: PBBFAC,TH,PO | Performed by: ADVANCED PRACTICE MIDWIFE

## 2022-06-20 RX ORDER — METRONIDAZOLE 500 MG/1
500 TABLET ORAL EVERY 12 HOURS
Qty: 14 TABLET | Refills: 0 | Status: SHIPPED | OUTPATIENT
Start: 2022-06-20 | End: 2022-06-27

## 2022-06-20 NOTE — PATIENT INSTRUCTIONS
Patient Education       Pregnancy - The Fourth Month   About this topic   It is important for you to learn how to take care of yourself to help you have a healthy baby and safe delivery. It is good to have health care throughout your pregnancy.  The fourth month of your pregnancy starts around week 14 and lasts through week 18. By knowing how far along you are, you can learn what is normal for this stage of your pregnancy and plan for what is next.  General   Growth and Development   During the fourth month of your pregnancy, here are some things you can expect.  You may:  Start to show that you are pregnant. It is normal to gain about 5 to 10 pounds (2.3 to 4.5 kg) total in your first 4 months.  Have heartburn  Feel like you have trouble paying attention to things  Have less nausea  Notice your breasts are growing and the veins are easier to see on them  Have swollen veins in your legs and feet, more nosebleeds, or bleeding when you brush your teeth. These are all because of the extra blood your body has while you are pregnant.  Notice more swelling in your hands and feet  Start to feel fluttering when you are lying or sitting quietly. This is your baby kicking.  Have pain in your sides with sudden movement. This is normal and happens because the ligaments in your belly are stretching.  Have a little more energy. Exercise is good for you, but check with your doctor before starting new exercises.  Most of the time it is safe for you to have sex while you are pregnant. It wont hurt the baby.  Your babys:  Skin is very thin and you can easily see blood vessels through it. Your baby is covered with lots of fine hair to protect their skin.  Bones are starting to harden. Your baby is able to frown, smile, stretch, and move.  Practicing breathing movements while inside of your womb  About 6 inches (16 cm) long and weighs about 7 ounces (200 gm). Your baby is about the size of an orange.  Things to Think About   Avoid  alcohol, drugs, tobacco products, and second hand smoke  Check with your doctor before taking any kind of drugs. Continue to take your vitamin with folic acid.  Avoid cleaning cat litter boxes. This can cause a disease that causes birth defects in your baby.  Amniocentesis and other prenatal screening tests may be done this month.  Try sleeping on your side. Use a pillow between your legs. Avoid sleeping on your back. This will help with the blood flow to your baby.  Change positions and get up slowly. Your heart has to work hard to cope with all of the extra blood volume.  Where will you take your baby for care after they are born? This is a good time to find a doctor for your baby.  Eat fresh fruits and foods with a lot of fiber to help with hard stools.  Drink at least 6 to 8 glasses of water each day.  When do I need to call the doctor?   Vaginal bleeding  Leaking of fluid from your vagina  Problems with constipation  Belly pain  Any illness or infection  Severe headaches or headaches that wont go away  Where can I learn more?   Better Health  https://www.betterhealth.[place]/health/HealthyLiving/pregnancy-stages-and-changes   Family Doctor  https://familydoctor.org/changes-in-your-body-during-pregnancy-first-trimester/   Last Reviewed Date   2020-04-20  Consumer Information Use and Disclaimer   This information is not specific medical advice and does not replace information you receive from your health care provider. This is only a brief summary of general information. It does NOT include all information about conditions, illnesses, injuries, tests, procedures, treatments, therapies, discharge instructions or life-style choices that may apply to you. You must talk with your health care provider for complete information about your health and treatment options. This information should not be used to decide whether or not to accept your health care providers advice, instructions or recommendations. Only your  health care provider has the knowledge and training to provide advice that is right for you.  Copyright   Copyright © 2021 [employer] and its affiliates and/or licensors. All rights reserved.

## 2022-06-20 NOTE — PROGRESS NOTES
27 y.o. female  at 20w1d   Is feeling flutters/FM, denies VB, LOF or cramping  Doing well without concerns   TW lbs   Reviewed anatomy US-vertex, anterior placenta, three-vessel cord, normal fluid, EFW 14 oz at 40 percentile, normal female anatomy-reassuring  Genetic testing declined  Reviewed warning signs, normal FM,  labor precautions and how/when to call.  RTC x 4 wks, call or present sooner prn.     I spent a total of 20 minutes on the day of the visit.This includes face to face time and non-face to face time preparing to see the patient (eg, review of tests), Obtaining and/or reviewing separately obtained history, Documenting clinical information in the electronic or other health record, Independently interpreting resultsand communicating results to the patient/family/caregiver, or Care coordination.

## 2022-07-18 ENCOUNTER — ROUTINE PRENATAL (OUTPATIENT)
Dept: OBSTETRICS AND GYNECOLOGY | Facility: CLINIC | Age: 27
End: 2022-07-18
Payer: MEDICAID

## 2022-07-18 VITALS
WEIGHT: 154.56 LBS | BODY MASS INDEX: 28.27 KG/M2 | SYSTOLIC BLOOD PRESSURE: 116 MMHG | DIASTOLIC BLOOD PRESSURE: 68 MMHG

## 2022-07-18 DIAGNOSIS — Z34.80 SUPERVISION OF OTHER NORMAL PREGNANCY: Primary | ICD-10-CM

## 2022-07-18 DIAGNOSIS — Z87.59 HISTORY OF PRIOR PREGNANCY WITH IUGR NEWBORN: ICD-10-CM

## 2022-07-18 PROCEDURE — 99213 PR OFFICE/OUTPT VISIT, EST, LEVL III, 20-29 MIN: ICD-10-PCS | Mod: TH,S$PBB,, | Performed by: ADVANCED PRACTICE MIDWIFE

## 2022-07-18 PROCEDURE — 99999 PR PBB SHADOW E&M-EST. PATIENT-LVL III: CPT | Mod: PBBFAC,,, | Performed by: ADVANCED PRACTICE MIDWIFE

## 2022-07-18 PROCEDURE — 99999 PR PBB SHADOW E&M-EST. PATIENT-LVL III: ICD-10-PCS | Mod: PBBFAC,,, | Performed by: ADVANCED PRACTICE MIDWIFE

## 2022-07-18 PROCEDURE — 99213 OFFICE O/P EST LOW 20 MIN: CPT | Mod: TH,S$PBB,, | Performed by: ADVANCED PRACTICE MIDWIFE

## 2022-07-18 PROCEDURE — 99213 OFFICE O/P EST LOW 20 MIN: CPT | Mod: PBBFAC,TH,PO | Performed by: ADVANCED PRACTICE MIDWIFE

## 2022-07-18 NOTE — PATIENT INSTRUCTIONS
Patient Education       Pregnancy - The Fifth Month   About this topic   It is important for you to learn how to take care of yourself to help you have a healthy baby and safe delivery. It is good to have health care throughout your pregnancy.  The fifth month of your pregnancy starts around week 19 and lasts through week 23. By knowing how far along you are, you can learn what is normal for this stage of your pregnancy and plan for what is next.  General   Growth and Development   During the fifth month of your pregnancy, here are some things you can expect.  You may:  Start to gain a little more weight. It is normal to gain about 10 to 15 pounds (4.5 to 7 kg) total in your first 5 months.  Have leg cramps. Be sure to drink plenty of water.  Have loose teeth.  Have more trouble breathing or with heartburn as your baby gets bigger  Start having [place] contractions. You may feel your belly squeezing or getting tight. Be sure to drink 6 to 8 glasses of water each day.  Notice you are able to express milk from your breasts  See stretch marks on your belly, breasts, or legs. Stay active to try and keep good muscle tone.  Be checked for gestational diabetes  Your baby's growth and development:  Your baby is covered with a thick whitish substance called vernix. This helps protect your babys skin.  Their genitals are able to be seen on an ultrasound. Your doctor will check your babys size, how much fluid there is around your baby, and all of your babys organs with the ultrasound.  Your baby is starting to be able to see, hear, taste, and feel touch.  The bones are starting to make blood cells.  Your baby is about 11 inches (28 cm) long and weighs about 1 pound (450 gm). Your baby is about the size of a grapefruit.  Things to Think About   Avoid alcohol, drugs, tobacco products, and second hand smoke  Do not clean your cat litter box. You could get a disease that causes birth defects to your baby.  Check with your  doctor before taking any kind of drugs. Continue to take your vitamin with folic acid.  Do you plan to breastfeed your baby?  Where will you deliver? Do you plan to take prenatal classes? If so, try to have them completed by your 8th month.  Start to think about your plans for pain control during labor.  You may want to learn about cord blood banking.  Rest and take breaks each day.  When do I need to call the doctor?   Contractions every 10 minutes or more often that do not go away with drinking water or position changes  Low, dull back pain that does not go away  Pressure in your pelvis that feels like your baby is pushing down  A gush or constant trickle of watery or bloody fluid leaking from your vagina  Cramps in your lower belly that come and go or are constant  Little to no movement felt by baby in 2 hours. Your baby should move at least 10 times every 2 hours.  Headache that does not go away, blurry vision, seeing spots or halos, increase in swelling in your hands, feet, or face, and pain under your ribs on the right side  Fever of 100.4°F (38°C) or higher  Bleeding or swelling of your gums  Urinating less, or having pain when you urinate  Vaginal bleeding with or without pain  After a car accident, fall, or any trauma to your belly  Having thoughts of harming yourself or others, or do not feel safe at home  Where can I learn more?   American Academy of Family Physicians  https://familydoctor.org/changes-in-your-body-during-pregnancy-second-trimester/   Better Health  https://www.betterhealth.[place].gov.[place]/health/HealthyLiving/pregnancy-stages-and-changes   Last Reviewed Date   2020-04-20  Consumer Information Use and Disclaimer   This information is not specific medical advice and does not replace information you receive from your health care provider. This is only a brief summary of general information. It does NOT include all information about conditions, illnesses, injuries, tests, procedures, treatments,  therapies, discharge instructions or life-style choices that may apply to you. You must talk with your health care provider for complete information about your health and treatment options. This information should not be used to decide whether or not to accept your health care providers advice, instructions or recommendations. Only your health care provider has the knowledge and training to provide advice that is right for you.  Copyright   Copyright © 2021 [employer] and its affiliates and/or licensors. All rights reserved.

## 2022-07-18 NOTE — PROGRESS NOTES
27 y.o. female  at 24w1d   Reports + FM, denies VB, LOF, or cramping  Doing well without concerns   TW lbs   Request to do her 28 week labs when children a back at school-agreed  and orders placed,   tdap in third-trimester  Reviewed warning signs, normal FM,  labor precautions and how/when to call.  RTC x 4-5 wks, call or present sooner prn.     I spent a total of 20 minutes on the day of the visit.This includes face to face time and non-face to face time preparing to see the patient (eg, review of tests), Obtaining and/or reviewing separately obtained history, Documenting clinical information in the electronic or other health record, Independently interpreting resultsand communicating results to the patient/family/caregiver, or Care coordination.

## 2022-07-31 ENCOUNTER — PATIENT MESSAGE (OUTPATIENT)
Dept: OBSTETRICS AND GYNECOLOGY | Facility: CLINIC | Age: 27
End: 2022-07-31
Payer: MEDICAID

## 2022-08-01 ENCOUNTER — ROUTINE PRENATAL (OUTPATIENT)
Dept: OBSTETRICS AND GYNECOLOGY | Facility: CLINIC | Age: 27
End: 2022-08-01
Payer: MEDICAID

## 2022-08-01 ENCOUNTER — PATIENT MESSAGE (OUTPATIENT)
Dept: OBSTETRICS AND GYNECOLOGY | Facility: CLINIC | Age: 27
End: 2022-08-01

## 2022-08-01 VITALS — DIASTOLIC BLOOD PRESSURE: 70 MMHG | SYSTOLIC BLOOD PRESSURE: 120 MMHG | WEIGHT: 152.56 LBS | BODY MASS INDEX: 27.9 KG/M2

## 2022-08-01 DIAGNOSIS — F32.9 REACTIVE DEPRESSION: Primary | ICD-10-CM

## 2022-08-01 PROCEDURE — 99213 PR OFFICE/OUTPT VISIT, EST, LEVL III, 20-29 MIN: ICD-10-PCS | Mod: TH,S$PBB,, | Performed by: ADVANCED PRACTICE MIDWIFE

## 2022-08-01 PROCEDURE — 99213 OFFICE O/P EST LOW 20 MIN: CPT | Mod: TH,S$PBB,, | Performed by: ADVANCED PRACTICE MIDWIFE

## 2022-08-01 PROCEDURE — 99999 PR PBB SHADOW E&M-EST. PATIENT-LVL II: ICD-10-PCS | Mod: PBBFAC,,, | Performed by: ADVANCED PRACTICE MIDWIFE

## 2022-08-01 PROCEDURE — 99999 PR PBB SHADOW E&M-EST. PATIENT-LVL II: CPT | Mod: PBBFAC,,, | Performed by: ADVANCED PRACTICE MIDWIFE

## 2022-08-01 PROCEDURE — 99212 OFFICE O/P EST SF 10 MIN: CPT | Mod: PBBFAC,PO | Performed by: ADVANCED PRACTICE MIDWIFE

## 2022-08-01 NOTE — PROGRESS NOTES
Following patient call regarding emotional/mental well-being advised to come on in which she did attended by her 3 young children.   works away 6 weeks on 3 weeks off and is not due back for another 10 days.  She has very little support.  Plans to attend [facility] for marital counseling and guidance.  Did call her  who had his mother  help last evening.  But she does live 2 hours away Also encourage mother's day out so that she can have a break to attend to her own needs.  Very articulate expressing her feelings, denies any suicidal ideology, is taking Zoloft 25 mg which is a little helpful and so we have decided to increase to 50 mg and added Vistaril to be used p.r.n. anxiety/feeling over.  Aware symptoms worsen present to emergency room and let us know.  Encouraged to contact me with any concerns or questions  Computer not allow me to send either prescription through [employer] will contact ID in the meanwhile pharmacy called and verbal request for both medications is given to Asher

## 2022-08-29 ENCOUNTER — LAB VISIT (OUTPATIENT)
Dept: LAB | Facility: HOSPITAL | Age: 27
End: 2022-08-29
Attending: ADVANCED PRACTICE MIDWIFE
Payer: MEDICAID

## 2022-08-29 ENCOUNTER — ROUTINE PRENATAL (OUTPATIENT)
Dept: OBSTETRICS AND GYNECOLOGY | Facility: CLINIC | Age: 27
End: 2022-08-29
Payer: MEDICAID

## 2022-08-29 VITALS
SYSTOLIC BLOOD PRESSURE: 116 MMHG | WEIGHT: 162.06 LBS | BODY MASS INDEX: 29.64 KG/M2 | DIASTOLIC BLOOD PRESSURE: 66 MMHG

## 2022-08-29 DIAGNOSIS — F32.A DEPRESSION DURING PREGNANCY IN THIRD TRIMESTER: ICD-10-CM

## 2022-08-29 DIAGNOSIS — O99.343 DEPRESSION DURING PREGNANCY IN THIRD TRIMESTER: ICD-10-CM

## 2022-08-29 DIAGNOSIS — Z87.59 HISTORY OF PRIOR PREGNANCY WITH IUGR NEWBORN: ICD-10-CM

## 2022-08-29 DIAGNOSIS — Z34.80 SUPERVISION OF OTHER NORMAL PREGNANCY: ICD-10-CM

## 2022-08-29 DIAGNOSIS — Z34.80 SUPERVISION OF OTHER NORMAL PREGNANCY: Primary | ICD-10-CM

## 2022-08-29 LAB
BASOPHILS # BLD AUTO: 0.03 K/UL (ref 0–0.2)
BASOPHILS NFR BLD: 0.4 % (ref 0–1.9)
DIFFERENTIAL METHOD: ABNORMAL
EOSINOPHIL # BLD AUTO: 0 K/UL (ref 0–0.5)
EOSINOPHIL NFR BLD: 0.2 % (ref 0–8)
ERYTHROCYTE [DISTWIDTH] IN BLOOD BY AUTOMATED COUNT: 12.7 % (ref 11.5–14.5)
GLUCOSE SERPL-MCNC: 114 MG/DL (ref 70–140)
HCT VFR BLD AUTO: 28.8 % (ref 37–48.5)
HGB BLD-MCNC: 9.5 G/DL (ref 12–16)
IMM GRANULOCYTES # BLD AUTO: 0.07 K/UL (ref 0–0.04)
IMM GRANULOCYTES NFR BLD AUTO: 0.8 % (ref 0–0.5)
LYMPHOCYTES # BLD AUTO: 1.2 K/UL (ref 1–4.8)
LYMPHOCYTES NFR BLD: 13.9 % (ref 18–48)
MCH RBC QN AUTO: 29.6 PG (ref 27–31)
MCHC RBC AUTO-ENTMCNC: 33 G/DL (ref 32–36)
MCV RBC AUTO: 90 FL (ref 82–98)
MONOCYTES # BLD AUTO: 0.4 K/UL (ref 0.3–1)
MONOCYTES NFR BLD: 4.3 % (ref 4–15)
NEUTROPHILS # BLD AUTO: 6.8 K/UL (ref 1.8–7.7)
NEUTROPHILS NFR BLD: 80.4 % (ref 38–73)
NRBC BLD-RTO: 0 /100 WBC
PLATELET # BLD AUTO: 197 K/UL (ref 150–450)
PMV BLD AUTO: 11.3 FL (ref 9.2–12.9)
RBC # BLD AUTO: 3.21 M/UL (ref 4–5.4)
WBC # BLD AUTO: 8.43 K/UL (ref 3.9–12.7)

## 2022-08-29 PROCEDURE — 86592 SYPHILIS TEST NON-TREP QUAL: CPT | Performed by: ADVANCED PRACTICE MIDWIFE

## 2022-08-29 PROCEDURE — 85025 COMPLETE CBC W/AUTO DIFF WBC: CPT | Mod: PO | Performed by: ADVANCED PRACTICE MIDWIFE

## 2022-08-29 PROCEDURE — 82950 GLUCOSE TEST: CPT | Mod: PO | Performed by: ADVANCED PRACTICE MIDWIFE

## 2022-08-29 PROCEDURE — 36415 COLL VENOUS BLD VENIPUNCTURE: CPT | Mod: PO | Performed by: ADVANCED PRACTICE MIDWIFE

## 2022-08-29 PROCEDURE — 99213 OFFICE O/P EST LOW 20 MIN: CPT | Mod: PBBFAC,TH,PO | Performed by: ADVANCED PRACTICE MIDWIFE

## 2022-08-29 PROCEDURE — 87389 HIV-1 AG W/HIV-1&-2 AB AG IA: CPT | Performed by: ADVANCED PRACTICE MIDWIFE

## 2022-08-29 PROCEDURE — 99999 PR PBB SHADOW E&M-EST. PATIENT-LVL III: ICD-10-PCS | Mod: PBBFAC,,, | Performed by: ADVANCED PRACTICE MIDWIFE

## 2022-08-29 PROCEDURE — 99999 PR PBB SHADOW E&M-EST. PATIENT-LVL III: CPT | Mod: PBBFAC,,, | Performed by: ADVANCED PRACTICE MIDWIFE

## 2022-08-29 PROCEDURE — 99213 PR OFFICE/OUTPT VISIT, EST, LEVL III, 20-29 MIN: ICD-10-PCS | Mod: TH,S$PBB,, | Performed by: ADVANCED PRACTICE MIDWIFE

## 2022-08-29 PROCEDURE — 99213 OFFICE O/P EST LOW 20 MIN: CPT | Mod: TH,S$PBB,, | Performed by: ADVANCED PRACTICE MIDWIFE

## 2022-08-29 RX ORDER — SERTRALINE HYDROCHLORIDE 50 MG/1
50 TABLET, FILM COATED ORAL DAILY
COMMUNITY
Start: 2022-08-01 | End: 2023-07-25 | Stop reason: SDUPTHER

## 2022-08-29 RX ORDER — HYDROXYZINE PAMOATE 50 MG/1
CAPSULE ORAL
COMMUNITY
Start: 2022-08-01 | End: 2022-09-01

## 2022-08-29 RX ORDER — ESCITALOPRAM OXALATE 20 MG/1
20 TABLET ORAL DAILY
COMMUNITY
Start: 2022-07-01 | End: 2022-09-12

## 2022-08-29 RX ORDER — HYDROXYZINE HYDROCHLORIDE 50 MG/1
TABLET, FILM COATED ORAL
COMMUNITY
Start: 2022-07-01 | End: 2022-09-12 | Stop reason: SDUPTHER

## 2022-08-29 NOTE — PROGRESS NOTES
27 y.o. female  at 30w1d   Reports + FM, denies VB, LOF or CTX  Doing well without concerns     Feeling better today following the initiation of Zoloft 50 mg and Vistaril but is experiencing night menses.  Taking both medications at night advised to start hard thing the Vistaril and takes Zoloft in the morning and re-evaluate  Children at school which is helpful    TW lbs    28wk labs today (A POS)    Consider Tdap next visit     Reviewed warning signs, normal FKCs,  labor precautions and how/when to call.  RTC x 2 wks, call or present sooner prn.     I spent a total of 20 minutes on the day of the visit.This includes face to face time and non-face to face time preparing to see the patient (eg, review of tests), Obtaining and/or reviewing separately obtained history, Documenting clinical information in the electronic or other health record, Independently interpreting resultsand communicating results to the patient/family/caregiver, or Care coordination.

## 2022-08-29 NOTE — PATIENT INSTRUCTIONS
Patient Education       Pregnancy - The Seventh Month   About this topic   It is important for you to learn how to take care of yourself to help you have a healthy baby and safe delivery. It is good to have health care throughout your pregnancy.  The seventh month of your pregnancy starts around week 29 and lasts through week 32. By knowing how far along you are, you can learn what is normal for this stage of your pregnancy and plan for what is next.  General   Your body   During the seventh month of your pregnancy, here are some things you can expect.  You may:  Have weight gain of about 15 to 20 pounds (6.8 to 9 kg) total in your first 7 months.  Have more trouble moving about and sleeping as the baby gets bigger  Have very vivid dreams  Notice more vaginal discharge  Notice a creamy, watery substance leaking from your nipples  Need a shot called Rh immune globulin if your blood type may be different from your baby's  Your baby's growth and development:  Your baby is gaining weight and adding layers of fat.  Your baby turns to be head down, into the position for delivery.  They are able to respond to loud noises and to dream.  Your baby moves at least 10 times in 2 hours. If your baby isn't moving this much, talk to your doctor right away.  Your baby is about 16 inches (42 cm) long and weighs about 4 pounds (1,800 gm). Your baby is about the size of squash.  Things to Think About   Avoid alcohol, drugs, tobacco products, and second hand smoke  Think about what you want your baby's birth to be like. Who do you want with you?  Find out about what lactation services are covered by your insurance.  Look into lactation consultants and breastfeeding classes.  Where do you want to deliver? Its time to make a plan for labor and delivery.  Plan on getting a car seat and other things for your baby.  Talk to your doctor if you plan to travel or get on a plane.  When do I need to call the doctor?   Contractions every 10  minutes or more often that do not go away with drinking water or position changes.  Low, dull back pain that does not go away  Feeling unusually dizzy or tired  Pressure in your pelvis that feels like your baby is pushing down  A gush or constant trickle of watery or bloody fluid leaking from your vagina  Cramps in your lower belly that come and go or are constant  Little to no movement felt by baby in 2 hours. Your baby should be moving at least 10 times every 2 hours.  Headache that does not go away; blurry vision; seeing spots or halos; increase in swelling in your hands, feet, or face; and pain under your ribs on the right side  Vaginal bleeding with or without pain  Fever of 100.4°F (38°C) or higher  After a car accident, fall, or any trauma to your belly  Having thoughts of harming yourself or others, or do not feel safe at home  Where can I learn more?   [employer]  https://[email or web address]/changes-in-your-body-during-pregnancy-second-trimester/   American Academy [employer]icians  https://familydoctor.org/changes-in-your-body-during-pregnancy-third-trimester/   [employer]  http://kidshealth.org/en/parents/pregnancy-calendar-intro.html?WT.ac=p-jeannie   [employer] on Womens Health  https://www.womenshealth.gov/pregnancy/youre-pregnant-now-what/stages-pregnancy   Last Reviewed Date   2020-05-06  Consumer Information Use and Disclaimer   This information is not specific medical advice and does not replace information you receive from your health care provider. This is only a brief summary of general information. It does NOT include all information about conditions, illnesses, injuries, tests, procedures, treatments, therapies, discharge instructions or life-style choices that may apply to you. You must talk with your health care provider for complete information about your health and treatment options. This information should not be used to decide whether or not to accept your health  care providers advice, instructions or recommendations. Only your health care provider has the knowledge and training to provide advice that is right for you.  Copyright   Copyright © 2021 [employer] and its affiliates and/or licensors. All rights reserved.  Patient Education       Fetal Movement   About this topic   Feeling your baby move for the first time is a good sign that your baby is doing well. You may begin to feel these movements between the 18th and 25th weeks of your pregnancy. If this is your first time being pregnant, it may be closer to 25 weeks. Your baby has been moving around before this, but the kicks have not been strong enough for you to feel. During the first weeks of feeling movement, you may start to see a pattern during the day when your baby is most active. You can track your baby's kicks each day at home. This is also known as kick counting. It is a good way to check on your baby's movements and well being.  Most often, fetal kick counting is used in high-risk pregnancies. It may be useful for all pregnancies. Counting and writing down your baby's kicks, jabs, twists, flutters, rolls, turns, flips, and swishes may help find a problem that needs more evaluation. The American College of Obstetricians and Gynecologists, or ACOG, suggests that you record how much time it takes you to feel 10 of these movements. Ideally, you should be able to feel 10 movements within 2 hours. Many people will track these movements in much less time.  General   How to Track Your Baby's Kick Counts   Most often your doctor will want you to wait until the 28th to 30th weeks of your pregnancy to start kick counting. Here are some tips to help you get started.  Find the time of day when your baby is most active. For some people, this is right after eating. Others find their baby moving a lot after they have been exercising or more active. Some babies are more active in the evenings when your blood sugar starts  "to lower.  Try to count kicks at about the same time each day.  Before you start counting, have something to eat or drink. Also take a short walk or do some light activity.  Choose a quiet place where you can focus on your baby's movements. Also get in a comfortable position. Try and lie on one side or the other. You may need to change positions until you find one that works best for you and your baby.  Keep a notebook to track your baby's kicks. Your doctor may give you a chart to use or you can make your own. Write down the date, time you started counting, and the time of each "kick" during a 2-hour period until you have felt 10 kicks.  Once you have recorded 10 kicks within 2 hours you can stop counting.  If you are not able to record 10 movements over 2 hours you should get up and move around or eat something and try again.  If you are not able to record 10 movements over 2 hours the second time, call your doctor. They may want you to go to the hospital to get your baby checked.  When do I need to call the doctor?   You have felt less than 10 movements over a period of 2 hours.  It takes longer each day to record 10 movements.  There is a big change in the pattern of movements you are writing down.  You feel no movement for 2 hours even after eating a snack, light activity, and position changes.  Teach Back: Helping You Understand   The Teach Back Method helps you understand the information we are giving you. After you talk with the staff, tell them in your own words what you learned. This helps to make sure the staff has described each thing clearly. It also helps to explain things that may have been confusing. Before going home, make sure you can do these:  I can tell you about feeling my baby move.  I can tell you how I will track my babys kicks.  I can tell you what I will do if I feel less than 10 movements in 2 hours, it takes longer to feel my baby move 10 times, or there is a big change in how my baby " is moving.  Last Reviewed Date   2021-10-08  Consumer Information Use and Disclaimer   This information is not specific medical advice and does not replace information you receive from your health care provider. This is only a brief summary of general information. It does NOT include all information about conditions, illnesses, injuries, tests, procedures, treatments, therapies, discharge instructions or life-style choices that may apply to you. You must talk with your health care provider for complete information about your health and treatment options. This information should not be used to decide whether or not to accept your health care providers advice, instructions or recommendations. Only your health care provider has the knowledge and training to provide advice that is right for you.  Copyright   Copyright © 2021 [employer] and its affiliates and/or licensors. All rights reserved.

## 2022-08-30 LAB
HIV 1+2 AB+HIV1 P24 AG SERPL QL IA: NORMAL
RPR SER QL: NORMAL

## 2022-09-12 ENCOUNTER — ROUTINE PRENATAL (OUTPATIENT)
Dept: OBSTETRICS AND GYNECOLOGY | Facility: CLINIC | Age: 27
End: 2022-09-12
Payer: MEDICAID

## 2022-09-12 VITALS
DIASTOLIC BLOOD PRESSURE: 60 MMHG | BODY MASS INDEX: 30.56 KG/M2 | WEIGHT: 167.13 LBS | SYSTOLIC BLOOD PRESSURE: 114 MMHG

## 2022-09-12 DIAGNOSIS — Z34.80 SUPERVISION OF OTHER NORMAL PREGNANCY: Primary | ICD-10-CM

## 2022-09-12 DIAGNOSIS — Z87.59 HISTORY OF PRIOR PREGNANCY WITH IUGR NEWBORN: ICD-10-CM

## 2022-09-12 PROCEDURE — 99213 OFFICE O/P EST LOW 20 MIN: CPT | Mod: PBBFAC,TH,PO | Performed by: ADVANCED PRACTICE MIDWIFE

## 2022-09-12 PROCEDURE — 99999 PR PBB SHADOW E&M-EST. PATIENT-LVL III: CPT | Mod: PBBFAC,,, | Performed by: ADVANCED PRACTICE MIDWIFE

## 2022-09-12 PROCEDURE — 99213 PR OFFICE/OUTPT VISIT, EST, LEVL III, 20-29 MIN: ICD-10-PCS | Mod: TH,S$PBB,, | Performed by: ADVANCED PRACTICE MIDWIFE

## 2022-09-12 PROCEDURE — 99213 OFFICE O/P EST LOW 20 MIN: CPT | Mod: TH,S$PBB,, | Performed by: ADVANCED PRACTICE MIDWIFE

## 2022-09-12 PROCEDURE — 99999 PR PBB SHADOW E&M-EST. PATIENT-LVL III: ICD-10-PCS | Mod: PBBFAC,,, | Performed by: ADVANCED PRACTICE MIDWIFE

## 2022-09-12 NOTE — PROGRESS NOTES
27 y.o. female  at 32w1d   Reports + FM, denies VB, LOF or CTX  Doing well without concerns       TW lbs   Reviewed 28wk lab results (A POS) 1hr 114  Declines Tdap  mild Anemia Fe daily  Reviewed warning signs, normal FKCs,  labor precautions and how/when to call.  RTC x 2 wks, call or present sooner prn.     I spent a total of 20 minutes on the day of the visit.This includes face to face time and non-face to face time preparing to see the patient (eg, review of tests), Obtaining and/or reviewing separately obtained history, Documenting clinical information in the electronic or other health record, Independently interpreting resultsand communicating results to the patient/family/caregiver, or Care coordination.

## 2022-09-26 ENCOUNTER — ROUTINE PRENATAL (OUTPATIENT)
Dept: OBSTETRICS AND GYNECOLOGY | Facility: CLINIC | Age: 27
End: 2022-09-26
Payer: MEDICAID

## 2022-09-26 VITALS
BODY MASS INDEX: 31.25 KG/M2 | SYSTOLIC BLOOD PRESSURE: 114 MMHG | WEIGHT: 170.88 LBS | DIASTOLIC BLOOD PRESSURE: 62 MMHG

## 2022-09-26 DIAGNOSIS — Z34.80 SUPERVISION OF OTHER NORMAL PREGNANCY: Primary | ICD-10-CM

## 2022-09-26 DIAGNOSIS — D50.8 OTHER IRON DEFICIENCY ANEMIA: ICD-10-CM

## 2022-09-26 PROCEDURE — 99213 OFFICE O/P EST LOW 20 MIN: CPT | Mod: PBBFAC,TH,PO | Performed by: ADVANCED PRACTICE MIDWIFE

## 2022-09-26 PROCEDURE — 99999 PR PBB SHADOW E&M-EST. PATIENT-LVL III: CPT | Mod: PBBFAC,,, | Performed by: ADVANCED PRACTICE MIDWIFE

## 2022-09-26 PROCEDURE — 99999 PR PBB SHADOW E&M-EST. PATIENT-LVL III: ICD-10-PCS | Mod: PBBFAC,,, | Performed by: ADVANCED PRACTICE MIDWIFE

## 2022-09-26 PROCEDURE — 99213 OFFICE O/P EST LOW 20 MIN: CPT | Mod: TH,S$PBB,, | Performed by: ADVANCED PRACTICE MIDWIFE

## 2022-09-26 PROCEDURE — 99213 PR OFFICE/OUTPT VISIT, EST, LEVL III, 20-29 MIN: ICD-10-PCS | Mod: TH,S$PBB,, | Performed by: ADVANCED PRACTICE MIDWIFE

## 2022-09-26 NOTE — PATIENT INSTRUCTIONS
Patient Education       Pregnancy - The Ninth Month   About this topic   It is important for you to learn how to take care of yourself to help you have a healthy baby and safe delivery. It is good to have health care throughout your pregnancy.  The ninth month of your pregnancy starts around week 37 and lasts through delivery. By knowing how far along you are, you can learn what is normal for this stage of your pregnancy and plan for what is next.  General   Your body   During the ninth month of your pregnancy, here are some things you can expect.  You may:  Lose your mucous plug as your cervix starts to get thinner and dilate.  Notice a small amount of streaky red or pink spotting.  Your doctor may discuss stripping your membranes to help the labor progress.  Go into labor any time. Most women deliver their baby between 38 and 42 weeks.  Notice your belly button sticks out. It should go back to normal after you give birth.  Have a bit of extra energy as you get ready for your baby  Notice your breasts are leaking more. This is normal as your body is making the first milk you can feed your baby.  Have tests to check on how your baby is doing. Your doctor will most likely not let you be pregnant for more than 42 weeks.  Not gain any weight this month. Some mothers even lose 1 to 2 pounds (.45 to .9 kg).  Your baby's growth and development:  Your baby has been busy swallowing fluid and building up waste products for their first bowel movement.  Your baby may start sucking their thumb.  They may come out with dry skin, bruises, or a misshapen head. Living in a watery fluid and going through labor is tough on your baby too. These are all normal and will change in the first weeks of life.  Your baby may have lots of hair on their head or not very much at all. Long fingernails are normal.  Your baby is about 20 inches (51 cm) long and weighs about 7 1/2 pounds (3,400 gm). Your baby is about the size of a  watermelon.  Things to Think About   Avoid alcohol, drugs, tobacco products, and second hand smoke.  Talk to your doctor if you plan to travel or get on a plane.  Have your bag packed so you are ready for delivery.  Are you planning a natural childbirth or thinking about an epidural? Know things you can do to help cope with labor pain.  If you are having a boy, decide if you want to have him circumcised.  Be sure the car seat is installed the right way so you are ready to bring your baby home.  When do I need to call the doctor?   Contractions every 5 minutes or more often that do not go away with rest, drinking water, or position changes  Headache that does not go away; blurry vision; seeing spots or halos; increase in swelling in your hands, feet, or face; and pain under your ribs on the right side  Low, dull back pain that does not go away  Pressure in your pelvis that feels like your baby is pushing down  A gush or constant trickle of watery or bloody fluid leaking from your vagina  Little to no movement felt by baby in 2 hours. Your baby should be moving at least 10 times every 2 hours.  Vaginal bleeding with or without pain  Fever of 100.4°F (38°C) or higher  After a car accident, fall, or any trauma to your belly  Having thoughts of harming yourself or others, or do not feel safe at home  Where can I learn more?   American Academy of Family Physicians  https://familydoctor.org/changes-in-your-body-during-pregnancy-third-trimester/   Kids Health  http://kidshealth.org/en/parents/pregnancy-calendar-intro.html?WT.ac=p-jeannie   Office on Womens Health  https://www.womenshealth.gov/pregnancy/youre-pregnant-now-what/stages-pregnancy   Last Reviewed Date   2020-05-06  Consumer Information Use and Disclaimer   This information is not specific medical advice and does not replace information you receive from your health care provider. This is only a brief summary of general information. It does NOT include all  information about conditions, illnesses, injuries, tests, procedures, treatments, therapies, discharge instructions or life-style choices that may apply to you. You must talk with your health care provider for complete information about your health and treatment options. This information should not be used to decide whether or not to accept your health care providers advice, instructions or recommendations. Only your health care provider has the knowledge and training to provide advice that is right for you.  Copyright   Copyright © 2021 [employer] Inc. and its affiliates and/or licensors. All rights reserved.  Patient Education       Fetal Movement   About this topic   Feeling your baby move for the first time is a good sign that your baby is doing well. You may begin to feel these movements between the 18th and 25th weeks of your pregnancy. If this is your first time being pregnant, it may be closer to 25 weeks. Your baby has been moving around before this, but the kicks have not been strong enough for you to feel. During the first weeks of feeling movement, you may start to see a pattern during the day when your baby is most active. You can track your baby's kicks each day at home. This is also known as kick counting. It is a good way to check on your baby's movements and well being.  Most often, fetal kick counting is used in high-risk pregnancies. It may be useful for all pregnancies. Counting and writing down your baby's kicks, jabs, twists, flutters, rolls, turns, flips, and swishes may help find a problem that needs more evaluation. The American College of Obstetricians and Gynecologists, or ACOG, suggests that you record how much time it takes you to feel 10 of these movements. Ideally, you should be able to feel 10 movements within 2 hours. Many people will track these movements in much less time.  General   How to Track Your Baby's Kick Counts   Most often your doctor will want you to wait until the 28th  "to 30th weeks of your pregnancy to start kick counting. Here are some tips to help you get started.  Find the time of day when your baby is most active. For some people, this is right after eating. Others find their baby moving a lot after they have been exercising or more active. Some babies are more active in the evenings when your blood sugar starts to lower.  Try to count kicks at about the same time each day.  Before you start counting, have something to eat or drink. Also take a short walk or do some light activity.  Choose a quiet place where you can focus on your baby's movements. Also get in a comfortable position. Try and lie on one side or the other. You may need to change positions until you find one that works best for you and your baby.  Keep a notebook to track your baby's kicks. Your doctor may give you a chart to use or you can make your own. Write down the date, time you started counting, and the time of each "kick" during a 2-hour period until you have felt 10 kicks.  Once you have recorded 10 kicks within 2 hours you can stop counting.  If you are not able to record 10 movements over 2 hours you should get up and move around or eat something and try again.  If you are not able to record 10 movements over 2 hours the second time, call your doctor. They may want you to go to the hospital to get your baby checked.  When do I need to call the doctor?   You have felt less than 10 movements over a period of 2 hours.  It takes longer each day to record 10 movements.  There is a big change in the pattern of movements you are writing down.  You feel no movement for 2 hours even after eating a snack, light activity, and position changes.  Teach Back: Helping You Understand   The Teach Back Method helps you understand the information we are giving you. After you talk with the staff, tell them in your own words what you learned. This helps to make sure the staff has described each thing clearly. It also " "helps to explain things that may have been confusing. Before going home, make sure you can do these:  I can tell you about feeling my baby move.  I can tell you how I will track my babys kicks.  I can tell you what I will do if I feel less than 10 movements in 2 hours, it takes longer to feel my baby move 10 times, or there is a big change in how my baby is moving.  Last Reviewed Date   2021-10-08  Consumer Information Use and Disclaimer   This information is not specific medical advice and does not replace information you receive from your health care provider. This is only a brief summary of general information. It does NOT include all information about conditions, illnesses, injuries, tests, procedures, treatments, therapies, discharge instructions or life-style choices that may apply to you. You must talk with your health care provider for complete information about your health and treatment options. This information should not be used to decide whether or not to accept your health care providers advice, instructions or recommendations. Only your health care provider has the knowledge and training to provide advice that is right for you.  Copyright   Copyright ©  UpToDate, Inc. and its affiliates and/or licensors. All rights reserved.  Patient Education       How to Tell When Labor Starts   The Basics   Written by the doctors and editors at [facility]   What is labor? -- Labor is the way your body prepares to give birth when you are pregnant. Labor usually starts on its own between 37 and 42 weeks of pregnancy. Your "due date" is at 40 weeks.  A pregnancy that lasts 37 to 42 weeks is called a "term" pregnancy. When labor starts before 37 weeks, doctors call it "" labor.  What are the signs that labor is starting? -- The different signs that labor is starting can include the following:  The baby moves lower (or "drops") in your belly.  You have increased vaginal discharge that is thick, mucus-like, or " "slightly bloody. ("Vaginal discharge" is the term doctors use to describe the fluid that comes out of the vagina.) The increased vaginal discharge is sometimes called a "mucus plug" or a "bloody show."  Your "water breaks." During pregnancy, your baby is in a sac in your uterus and surrounded by a fluid called "amniotic fluid." This sac typically breaks open sometime before your baby is born. When it breaks open, the fluid inside comes out of your vagina. This can feel like a big gush or just a trickle of fluid.  You have low back pain or belly cramps.  You start having contractions. During a contraction, the uterus tightens. This can be painful and make your belly feel hard. After a contraction, the uterus relaxes and the pain goes away. Some people have "Storey Cruz contractions" or "false-labor contractions." These feel like contractions, but they are not true contractions. They do not mean that you are in labor.  How can I tell if I'm having true contractions? -- It can be hard to tell if you are having true contractions or Dev Cruz contractions. But here are some ways to help tell the difference.  True contractions come every few minutes and get more frequent over time. Storey Cruz contractions can come every few minutes, but they don't get more frequent over time.  True contractions don't go away, even when you rest. Storey Cruz contractions usually go away when you rest.  True contractions will get stronger and more painful over time. Storey Cruz contractions usually don't get stronger or more painful over time.  True contractions might be felt in your back and front. Dev Cruz contractions are usually only in front.  If you are still not sure whether you are having true contractions, call your doctor or midwife.  What should I do if I start having contractions? -- If you start having contractions, you should time them to see how far apart they are. That way, you can tell if they get more " "frequent.  You can time your contractions by keeping track of the time when each contraction starts. If you have a clock with a second hand or a timer on your smartphone, you can also time how long each contraction lasts. Your doctor or midwife will want to know how far apart your contractions are and how long they last.  When should I call my doctor or midwife? -- Call your doctor or midwife if you think you are in labor. You should also call if any of the following things happen:  You have blood, mucus, or fluid leaking from your vagina.  You have 6 or more contractions in 1 hour. (That means your contractions are 10 minutes apart or less.)  Your contractions are getting stronger and are painful.  Your doctor or midwife will probably want to see you to do an exam. To tell if you are in labor, they will check your cervix to see if it is opening ("dilated") and thinning out. They will see how frequent your contractions are. They might also do other tests.  What if my labor starts too soon? -- If you start having any symptoms of labor before 37 weeks, call your doctor right away. They might want to give you medicine to try to stop your labor.  What if my labor doesn't start on its own? -- If your labor doesn't start on its own, your doctor will talk to you about your options. They might try to start your labor with medicines. This is called "inducing labor."  How long will my labor last? -- If it's your first baby, your labor will probably last for many hours. If it's not your first baby, your labor will probably be shorter.  All topics are updated as new evidence becomes available and our peer review process is complete.  This topic retrieved from [employer] on: Sep 21, 2021.  Topic 93944 Version 9.0  Release: 29.4.2 - C29.263  © 2021 UpToDate, Inc. and/or its affiliates. All rights reserved.  Consumer Information Use and Disclaimer   This information is not specific medical advice and does not replace information you " receive from your health care provider. This is only a brief summary of general information. It does NOT include all information about conditions, illnesses, injuries, tests, procedures, treatments, therapies, discharge instructions or life-style choices that may apply to you. You must talk with your health care provider for complete information about your health and treatment options. This information should not be used to decide whether or not to accept your health care provider's advice, instructions or recommendations. Only your health care provider has the knowledge and training to provide advice that is right for you. The use of this information is governed by the [employer] End User License Agreement, available at https://www.[employer].aDealio/en/solutions/Athersys/about/felecia.The use of [employer] content is governed by the [employer] Terms of Use. ©2021 [employer] All rights reserved.  Copyright   © 2021 [employer] and/or its affiliates. All rights reserved.

## 2022-09-26 NOTE — PROGRESS NOTES
27 y.o. female  at 34w1d   Reports + FM, denies VB, LOF or regular CTX  Doing well without concerns   TW lbs   GBS  Next visit  Reviewed warning signs, normal FKCs, labor precautions and how/when to call.  RTC x 1 wks, call or present sooner prn.     I spent a total of 20 minutes on the day of the visit.This includes face to face time and non-face to face time preparing to see the patient (eg, review of tests), Obtaining and/or reviewing separately obtained history, Documenting clinical information in the electronic or other health record, Independently interpreting resultsand communicating results to the patient/family/caregiver, or Care coordination.

## 2022-10-06 ENCOUNTER — PROCEDURE VISIT (OUTPATIENT)
Dept: OBSTETRICS AND GYNECOLOGY | Facility: CLINIC | Age: 27
End: 2022-10-06
Payer: MEDICAID

## 2022-10-06 ENCOUNTER — ROUTINE PRENATAL (OUTPATIENT)
Dept: OBSTETRICS AND GYNECOLOGY | Facility: CLINIC | Age: 27
End: 2022-10-06
Payer: MEDICAID

## 2022-10-06 VITALS — DIASTOLIC BLOOD PRESSURE: 62 MMHG | SYSTOLIC BLOOD PRESSURE: 116 MMHG | WEIGHT: 168 LBS | BODY MASS INDEX: 30.73 KG/M2

## 2022-10-06 DIAGNOSIS — Z34.93 NORMAL PREGNANCY IN THIRD TRIMESTER: Primary | ICD-10-CM

## 2022-10-06 DIAGNOSIS — Z34.80 SUPERVISION OF OTHER NORMAL PREGNANCY: ICD-10-CM

## 2022-10-06 PROCEDURE — 99999 PR PBB SHADOW E&M-EST. PATIENT-LVL III: ICD-10-PCS | Mod: PBBFAC,,, | Performed by: ADVANCED PRACTICE MIDWIFE

## 2022-10-06 PROCEDURE — 99213 OFFICE O/P EST LOW 20 MIN: CPT | Mod: 25,TH,S$PBB, | Performed by: ADVANCED PRACTICE MIDWIFE

## 2022-10-06 PROCEDURE — 99213 PR OFFICE/OUTPT VISIT, EST, LEVL III, 20-29 MIN: ICD-10-PCS | Mod: 25,TH,S$PBB, | Performed by: ADVANCED PRACTICE MIDWIFE

## 2022-10-06 PROCEDURE — 87147 CULTURE TYPE IMMUNOLOGIC: CPT | Performed by: ADVANCED PRACTICE MIDWIFE

## 2022-10-06 PROCEDURE — 99999 PR PBB SHADOW E&M-EST. PATIENT-LVL III: CPT | Mod: PBBFAC,,, | Performed by: ADVANCED PRACTICE MIDWIFE

## 2022-10-06 PROCEDURE — 76816 OB US FOLLOW-UP PER FETUS: CPT | Mod: PBBFAC,PO | Performed by: OBSTETRICS & GYNECOLOGY

## 2022-10-06 PROCEDURE — 76816 US OB/GYN PROCEDURE (VIEWPOINT): ICD-10-PCS | Mod: 26,S$PBB,, | Performed by: OBSTETRICS & GYNECOLOGY

## 2022-10-06 PROCEDURE — 87081 CULTURE SCREEN ONLY: CPT | Performed by: ADVANCED PRACTICE MIDWIFE

## 2022-10-06 PROCEDURE — 99213 OFFICE O/P EST LOW 20 MIN: CPT | Mod: PBBFAC,PO | Performed by: ADVANCED PRACTICE MIDWIFE

## 2022-10-06 NOTE — PROGRESS NOTES
27 y.o. female  at 35w4d  Reports + FM, denies VB, LOF or regular CTX  Doing well without concerns     Ultrasound today-vertex, MVP 4.9 cm, EFW 6 lb even at 36 percentile-reassuring    TW lbs   GBS collected today cervix posterior starting to efface and soften  The skin of the suprapubic region was evaluated and appears clean and dry.  Counseled the patient to shower daily and to wash this area with an antibacterial soap such as Dial daily.  Advised her to not shave the hair from this area from now until after delivery.  I also counseled the patient to place antibacterial hand soap in all her bathrooms and kitchen at home to help facilitate proper hand hygiene practices before and after delivery.   Reviewed warning signs, normal FKCs, labor precautions and how/when to call.  RTC x 1 wks, call or present sooner prn.     I spent a total of 20 minutes on the day of the visit.This includes face to face time and non-face to face time preparing to see the patient (eg, review of tests), Obtaining and/or reviewing separately obtained history, Documenting clinical information in the electronic or other health record, Independently interpreting resultsand communicating results to the patient/family/caregiver, or Care coordination.

## 2022-10-06 NOTE — PATIENT INSTRUCTIONS
Patient Education       Pregnancy - The Ninth Month   About this topic   It is important for you to learn how to take care of yourself to help you have a healthy baby and safe delivery. It is good to have health care throughout your pregnancy.  The ninth month of your pregnancy starts around week 37 and lasts through delivery. By knowing how far along you are, you can learn what is normal for this stage of your pregnancy and plan for what is next.  General   Your body   During the ninth month of your pregnancy, here are some things you can expect.  You may:  Lose your mucous plug as your cervix starts to get thinner and dilate.  Notice a small amount of streaky red or pink spotting.  Your doctor may discuss stripping your membranes to help the labor progress.  Go into labor any time. Most women deliver their baby between 38 and 42 weeks.  Notice your belly button sticks out. It should go back to normal after you give birth.  Have a bit of extra energy as you get ready for your baby  Notice your breasts are leaking more. This is normal as your body is making the first milk you can feed your baby.  Have tests to check on how your baby is doing. Your doctor will most likely not let you be pregnant for more than 42 weeks.  Not gain any weight this month. Some mothers even lose 1 to 2 pounds (.45 to .9 kg).  Your baby's growth and development:  Your baby has been busy swallowing fluid and building up waste products for their first bowel movement.  Your baby may start sucking their thumb.  They may come out with dry skin, bruises, or a misshapen head. Living in a watery fluid and going through labor is tough on your baby too. These are all normal and will change in the first weeks of life.  Your baby may have lots of hair on their head or not very much at all. Long fingernails are normal.  Your baby is about 20 inches (51 cm) long and weighs about 7 1/2 pounds (3,400 gm). Your baby is about the size of a  watermelon.  Things to Think About   Avoid alcohol, drugs, tobacco products, and second hand smoke.  Talk to your doctor if you plan to travel or get on a plane.  Have your bag packed so you are ready for delivery.  Are you planning a natural childbirth or thinking about an epidural? Know things you can do to help cope with labor pain.  If you are having a boy, decide if you want to have him circumcised.  Be sure the car seat is installed the right way so you are ready to bring your baby home.  When do I need to call the doctor?   Contractions every 5 minutes or more often that do not go away with rest, drinking water, or position changes  Headache that does not go away; blurry vision; seeing spots or halos; increase in swelling in your hands, feet, or face; and pain under your ribs on the right side  Low, dull back pain that does not go away  Pressure in your pelvis that feels like your baby is pushing down  A gush or constant trickle of watery or bloody fluid leaking from your vagina  Little to no movement felt by baby in 2 hours. Your baby should be moving at least 10 times every 2 hours.  Vaginal bleeding with or without pain  Fever of 100.4°F (38°C) or higher  After a car accident, fall, or any trauma to your belly  Having thoughts of harming yourself or others, or do not feel safe at home  Where can I learn more?   American Academy of Family Physicians  https://familydoctor.org/changes-in-your-body-during-pregnancy-third-trimester/   Kids Health  http://kidshealth.org/en/parents/pregnancy-calendar-intro.html?WT.ac=p-jeannie   Office on Womens Health  https://www.womenshealth.gov/pregnancy/youre-pregnant-now-what/stages-pregnancy   Last Reviewed Date   2020-05-06  Consumer Information Use and Disclaimer   This information is not specific medical advice and does not replace information you receive from your health care provider. This is only a brief summary of general information. It does NOT include all  information about conditions, illnesses, injuries, tests, procedures, treatments, therapies, discharge instructions or life-style choices that may apply to you. You must talk with your health care provider for complete information about your health and treatment options. This information should not be used to decide whether or not to accept your health care providers advice, instructions or recommendations. Only your health care provider has the knowledge and training to provide advice that is right for you.  Copyright   Copyright © 2021 UpToDate, Inc. and its affiliates and/or licensors. All rights reserved.  Patient Education       Fetal Movement   About this topic   Feeling your baby move for the first time is a good sign that your baby is doing well. You may begin to feel these movements between the 18th and 25th weeks of your pregnancy. If this is your first time being pregnant, it may be closer to 25 weeks. Your baby has been moving around before this, but the kicks have not been strong enough for you to feel. During the first weeks of feeling movement, you may start to see a pattern during the day when your baby is most active. You can track your baby's kicks each day at home. This is also known as kick counting. It is a good way to check on your baby's movements and well being.  Most often, fetal kick counting is used in high-risk pregnancies. It may be useful for all pregnancies. Counting and writing down your baby's kicks, jabs, twists, flutters, rolls, turns, flips, and swishes may help find a problem that needs more evaluation. The American College of Obstetricians and Gynecologists, or ACOG, suggests that you record how much time it takes you to feel 10 of these movements. Ideally, you should be able to feel 10 movements within 2 hours. Many people will track these movements in much less time.  General   How to Track Your Baby's Kick Counts   Most often your doctor will want you to wait until the 28th  "to 30th weeks of your pregnancy to start kick counting. Here are some tips to help you get started.  Find the time of day when your baby is most active. For some people, this is right after eating. Others find their baby moving a lot after they have been exercising or more active. Some babies are more active in the evenings when your blood sugar starts to lower.  Try to count kicks at about the same time each day.  Before you start counting, have something to eat or drink. Also take a short walk or do some light activity.  Choose a quiet place where you can focus on your baby's movements. Also get in a comfortable position. Try and lie on one side or the other. You may need to change positions until you find one that works best for you and your baby.  Keep a notebook to track your baby's kicks. Your doctor may give you a chart to use or you can make your own. Write down the date, time you started counting, and the time of each "kick" during a 2-hour period until you have felt 10 kicks.  Once you have recorded 10 kicks within 2 hours you can stop counting.  If you are not able to record 10 movements over 2 hours you should get up and move around or eat something and try again.  If you are not able to record 10 movements over 2 hours the second time, call your doctor. They may want you to go to the hospital to get your baby checked.  When do I need to call the doctor?   You have felt less than 10 movements over a period of 2 hours.  It takes longer each day to record 10 movements.  There is a big change in the pattern of movements you are writing down.  You feel no movement for 2 hours even after eating a snack, light activity, and position changes.  Teach Back: Helping You Understand   The Teach Back Method helps you understand the information we are giving you. After you talk with the staff, tell them in your own words what you learned. This helps to make sure the staff has described each thing clearly. It also " "helps to explain things that may have been confusing. Before going home, make sure you can do these:  I can tell you about feeling my baby move.  I can tell you how I will track my babys kicks.  I can tell you what I will do if I feel less than 10 movements in 2 hours, it takes longer to feel my baby move 10 times, or there is a big change in how my baby is moving.  Last Reviewed Date   2021-10-08  Consumer Information Use and Disclaimer   This information is not specific medical advice and does not replace information you receive from your health care provider. This is only a brief summary of general information. It does NOT include all information about conditions, illnesses, injuries, tests, procedures, treatments, therapies, discharge instructions or life-style choices that may apply to you. You must talk with your health care provider for complete information about your health and treatment options. This information should not be used to decide whether or not to accept your health care providers advice, instructions or recommendations. Only your health care provider has the knowledge and training to provide advice that is right for you.  Copyright   Copyright ©  UpToDate, Inc. and its affiliates and/or licensors. All rights reserved.  Patient Education       How to Tell When Labor Starts   The Basics   Written by the doctors and editors at [facility]   What is labor? -- Labor is the way your body prepares to give birth when you are pregnant. Labor usually starts on its own between 37 and 42 weeks of pregnancy. Your "due date" is at 40 weeks.  A pregnancy that lasts 37 to 42 weeks is called a "term" pregnancy. When labor starts before 37 weeks, doctors call it "" labor.  What are the signs that labor is starting? -- The different signs that labor is starting can include the following:  The baby moves lower (or "drops") in your belly.  You have increased vaginal discharge that is thick, mucus-like, or " "slightly bloody. ("Vaginal discharge" is the term doctors use to describe the fluid that comes out of the vagina.) The increased vaginal discharge is sometimes called a "mucus plug" or a "bloody show."  Your "water breaks." During pregnancy, your baby is in a sac in your uterus and surrounded by a fluid called "amniotic fluid." This sac typically breaks open sometime before your baby is born. When it breaks open, the fluid inside comes out of your vagina. This can feel like a big gush or just a trickle of fluid.  You have low back pain or belly cramps.  You start having contractions. During a contraction, the uterus tightens. This can be painful and make your belly feel hard. After a contraction, the uterus relaxes and the pain goes away. Some people have "[place] contractions" or "false-labor contractions." These feel like contractions, but they are not true contractions. They do not mean that you are in labor.  How can I tell if I'm having true contractions? -- It can be hard to tell if you are having true contractions or [place] contractions. But here are some ways to help tell the difference.  True contractions come every few minutes and get more frequent over time. [place] contractions can come every few minutes, but they don't get more frequent over time.  True contractions don't go away, even when you rest. [place] contractions usually go away when you rest.  True contractions will get stronger and more painful over time. [place] contractions usually don't get stronger or more painful over time.  True contractions might be felt in your back and front. Dev [place] contractions are usually only in front.  If you are still not sure whether you are having true contractions, call your doctor or midwife.  What should I do if I start having contractions? -- If you start having contractions, you should time them to see how far apart they are. That way, you can tell if they get more " "frequent.  You can time your contractions by keeping track of the time when each contraction starts. If you have a clock with a second hand or a timer on your smartphone, you can also time how long each contraction lasts. Your doctor or midwife will want to know how far apart your contractions are and how long they last.  When should I call my doctor or midwife? -- Call your doctor or midwife if you think you are in labor. You should also call if any of the following things happen:  You have blood, mucus, or fluid leaking from your vagina.  You have 6 or more contractions in 1 hour. (That means your contractions are 10 minutes apart or less.)  Your contractions are getting stronger and are painful.  Your doctor or midwife will probably want to see you to do an exam. To tell if you are in labor, they will check your cervix to see if it is opening ("dilated") and thinning out. They will see how frequent your contractions are. They might also do other tests.  What if my labor starts too soon? -- If you start having any symptoms of labor before 37 weeks, call your doctor right away. They might want to give you medicine to try to stop your labor.  What if my labor doesn't start on its own? -- If your labor doesn't start on its own, your doctor will talk to you about your options. They might try to start your labor with medicines. This is called "inducing labor."  How long will my labor last? -- If it's your first baby, your labor will probably last for many hours. If it's not your first baby, your labor will probably be shorter.  All topics are updated as new evidence becomes available and our peer review process is complete.  This topic retrieved from [employer] on: Sep 21, 2021.  Topic 14173 Version 9.0  Release: 29.4.2 - C29.263  © 2021 UpToDate, Inc. and/or its affiliates. All rights reserved.  Consumer Information Use and Disclaimer   This information is not specific medical advice and does not replace information you " receive from your health care provider. This is only a brief summary of general information. It does NOT include all information about conditions, illnesses, injuries, tests, procedures, treatments, therapies, discharge instructions or life-style choices that may apply to you. You must talk with your health care provider for complete information about your health and treatment options. This information should not be used to decide whether or not to accept your health care provider's advice, instructions or recommendations. Only your health care provider has the knowledge and training to provide advice that is right for you. The use of this information is governed by the [employer] End User License Agreement, available at https://www.[employer]/en/solutions/E[employer]/about/felecia.The use of [employer] content is governed by the [employer] Terms of Use. ©2021 [employer] All rights reserved.  Copyright   © 2021 [employer] and/or its affiliates. All rights reserved.

## 2022-10-10 LAB — BACTERIA SPEC AEROBE CULT: ABNORMAL

## 2022-10-11 ENCOUNTER — PATIENT MESSAGE (OUTPATIENT)
Dept: OBSTETRICS AND GYNECOLOGY | Facility: CLINIC | Age: 27
End: 2022-10-11
Payer: MEDICAID

## 2022-10-11 PROBLEM — O99.820 GBS (GROUP B STREPTOCOCCUS CARRIER), +RV CULTURE, CURRENTLY PREGNANT: Status: ACTIVE | Noted: 2022-10-11

## 2022-10-11 PROBLEM — B96.89 BACTERIAL VAGINOSIS IN PREGNANCY: Status: RESOLVED | Noted: 2022-03-26 | Resolved: 2022-10-11

## 2022-10-11 PROBLEM — O23.599 BACTERIAL VAGINOSIS IN PREGNANCY: Status: RESOLVED | Noted: 2022-03-26 | Resolved: 2022-10-11

## 2022-10-13 ENCOUNTER — TELEPHONE (OUTPATIENT)
Dept: OBSTETRICS AND GYNECOLOGY | Facility: CLINIC | Age: 27
End: 2022-10-13
Payer: MEDICAID

## 2022-10-13 ENCOUNTER — ROUTINE PRENATAL (OUTPATIENT)
Dept: OBSTETRICS AND GYNECOLOGY | Facility: CLINIC | Age: 27
End: 2022-10-13
Payer: MEDICAID

## 2022-10-13 VITALS — DIASTOLIC BLOOD PRESSURE: 70 MMHG | BODY MASS INDEX: 31.37 KG/M2 | WEIGHT: 171.5 LBS | SYSTOLIC BLOOD PRESSURE: 132 MMHG

## 2022-10-13 DIAGNOSIS — Z3A.36 36 WEEKS GESTATION OF PREGNANCY: ICD-10-CM

## 2022-10-13 DIAGNOSIS — Z34.93 NORMAL PREGNANCY IN THIRD TRIMESTER: Primary | ICD-10-CM

## 2022-10-13 DIAGNOSIS — O12.13 GESTATIONAL PROTEINURIA IN THIRD TRIMESTER: ICD-10-CM

## 2022-10-13 LAB
CREAT UR-MCNC: 166.4 MG/DL (ref 15–325)
PROT UR-MCNC: 30 MG/DL (ref 0–15)
PROT/CREAT UR: 0.18 MG/G{CREAT} (ref 0–0.2)

## 2022-10-13 PROCEDURE — 99213 PR OFFICE/OUTPT VISIT, EST, LEVL III, 20-29 MIN: ICD-10-PCS | Mod: TH,S$PBB,, | Performed by: MIDWIFE

## 2022-10-13 PROCEDURE — 82570 ASSAY OF URINE CREATININE: CPT | Mod: PO | Performed by: MIDWIFE

## 2022-10-13 PROCEDURE — 99999 PR PBB SHADOW E&M-EST. PATIENT-LVL III: CPT | Mod: PBBFAC,,, | Performed by: MIDWIFE

## 2022-10-13 PROCEDURE — 99213 OFFICE O/P EST LOW 20 MIN: CPT | Mod: PBBFAC,TH,PO | Performed by: MIDWIFE

## 2022-10-13 PROCEDURE — 99213 OFFICE O/P EST LOW 20 MIN: CPT | Mod: TH,S$PBB,, | Performed by: MIDWIFE

## 2022-10-13 PROCEDURE — 99999 PR PBB SHADOW E&M-EST. PATIENT-LVL III: ICD-10-PCS | Mod: PBBFAC,,, | Performed by: MIDWIFE

## 2022-10-13 NOTE — TELEPHONE ENCOUNTER
Gayla, this number looks ok. Normal Protein/Creatinine ratio   Written by Michael Sandoval CNM on 10/13/2022 10:39 AM CDT    Called patient and after verifying with 2 identifiers the above results discussed.  Patient advised to go to L&D with any headaches or visual changes.  Patient verbalized understanding.

## 2022-10-13 NOTE — TELEPHONE ENCOUNTER
----- Message from Jasmin Marie sent at 10/13/2022  4:34 PM CDT -----  Contact: Gayla  The patient is requesting a callback from the nurse of Pamela Walsh for test results please.    Please call Gayla at [phone] (home)      Thanks

## 2022-10-13 NOTE — PROGRESS NOTES
27 y.o. female  at 36w4d   Reports + FM, denies VB, LOF or regular CTX  Doing ok, some N/V/D, for the last 2 days, will see how th day goes in worsens or does not improve will let us know, feels like she may be getting ready for labor, urine dip 1+ protein, will run PCR  TW lbs   Reviewed warning signs, normal FKCs, labor precautions and how/when to call.  RTC x 1 wks, call or present sooner prn.     I spent 20 minutes w/ pt. This includes face to face time and non-face to face time preparing to see the patient (eg, review of tests), obtaining and/or reviewing separately obtained history, documenting clinical information in the electronic or other health record, independently interpreting results and communicating results to the patient/family/caregiver, or care coordinator.

## 2022-10-17 ENCOUNTER — HOSPITAL ENCOUNTER (OUTPATIENT)
Facility: HOSPITAL | Age: 27
Discharge: HOME | End: 2022-10-18
Attending: OBSTETRICS & GYNECOLOGY | Admitting: OBSTETRICS & GYNECOLOGY
Payer: MEDICAID

## 2022-10-17 DIAGNOSIS — O47.9 THREATENED LABOR AT TERM: ICD-10-CM

## 2022-10-17 PROCEDURE — 99211 OFF/OP EST MAY X REQ PHY/QHP: CPT | Mod: 25

## 2022-10-17 PROCEDURE — 59025 FETAL NON-STRESS TEST: CPT

## 2022-10-18 VITALS
OXYGEN SATURATION: 96 % | HEART RATE: 94 BPM | DIASTOLIC BLOOD PRESSURE: 75 MMHG | RESPIRATION RATE: 18 BRPM | SYSTOLIC BLOOD PRESSURE: 125 MMHG | TEMPERATURE: 98 F

## 2022-10-18 PROBLEM — O47.9 THREATENED LABOR AT TERM: Status: ACTIVE | Noted: 2022-10-18

## 2022-10-18 PROCEDURE — 59025 FETAL NON-STRESS TEST: CPT | Mod: 26,,, | Performed by: ADVANCED PRACTICE MIDWIFE

## 2022-10-18 PROCEDURE — 99213 PR OFFICE/OUTPT VISIT, EST, LEVL III, 20-29 MIN: ICD-10-PCS | Mod: TH,25,, | Performed by: ADVANCED PRACTICE MIDWIFE

## 2022-10-18 PROCEDURE — 99213 OFFICE O/P EST LOW 20 MIN: CPT | Mod: TH,25,, | Performed by: ADVANCED PRACTICE MIDWIFE

## 2022-10-18 PROCEDURE — 59025 FETAL NON-STRESS TEST: CPT

## 2022-10-18 PROCEDURE — 59025 OBTAIN FETAL NONSTRESS TEST (NST): ICD-10-PCS | Mod: 26,,, | Performed by: ADVANCED PRACTICE MIDWIFE

## 2022-10-18 RX ORDER — ACETAMINOPHEN 500 MG
500 TABLET ORAL EVERY 6 HOURS PRN
Status: DISCONTINUED | OUTPATIENT
Start: 2022-10-18 | End: 2022-10-18 | Stop reason: HOSPADM

## 2022-10-18 RX ORDER — ONDANSETRON 8 MG/1
8 TABLET, ORALLY DISINTEGRATING ORAL EVERY 8 HOURS PRN
Status: DISCONTINUED | OUTPATIENT
Start: 2022-10-18 | End: 2022-10-18 | Stop reason: HOSPADM

## 2022-10-18 RX ORDER — PROCHLORPERAZINE EDISYLATE 5 MG/ML
5 INJECTION INTRAMUSCULAR; INTRAVENOUS EVERY 6 HOURS PRN
Status: DISCONTINUED | OUTPATIENT
Start: 2022-10-18 | End: 2022-10-18 | Stop reason: HOSPADM

## 2022-10-18 NOTE — HPI
28yo  EGA 37w2d presents to LD with c/o regular contractions. Denies VB or LOF. Reports good fetal movement.

## 2022-10-18 NOTE — SUBJECTIVE & OBJECTIVE
Obstetric HPI:    This pregnancy has been complicated by depression, hx IUGR baby    OB History    Para Term  AB Living   4 3 3 0 0 3   SAB IAB Ectopic Multiple Live Births   0 0 0 0 3      # Outcome Date GA Lbr Dennis/2nd Weight Sex Delivery Anes PTL Lv   4 Current            3 Term 21 37w0d  1.88 kg (4 lb 2.3 oz) F Vag-Spont EPI N TOMASA      Name: RAINE,GIRL ZIYAD      Apgar1: 8  Apgar5: 9   2 Term 11/15/17 38w0d  3.225 kg (7 lb 1.8 oz) M Vag-Spont EPI N TOMSAA      Name: RAINE, BOY ZIYAD      Apgar1: 8  Apgar5: 9   1 Term 11/30/15 39w2d / 00:26 3.15 kg (6 lb 15.1 oz) F Vag-Spont EPI N TOMASA      Apgar1: 9  Apgar5: 9     Past Medical History:   Diagnosis Date    Abnormal Pap smear of cervix     MITZI 2    Anemia     Anxiety     Depression     Depression 3/15/2022    Headache(784.0)     Hemorrhoids     Inflammatory bowel disease     Postpartum depression      Past Surgical History:   Procedure Laterality Date    ADENOIDECTOMY      APPENDECTOMY      CERVICAL BIOPSY  W/ LOOP ELECTRODE EXCISION      FOOT SURGERY      TONSILLECTOMY         PTA Medications   Medication Sig    hydrOXYzine pamoate (VISTARIL) 50 MG Cap TAKE 1 CAPSULE BY MOUTH ONCE DAILY AS NEEDED    pantoprazole (PROTONIX) 40 MG tablet Take 40 mg by mouth once daily.    PNV no.95/ferrous fum/folic ac (PRENATAL ORAL) Take by mouth.    sertraline (ZOLOFT) 50 MG tablet Take 50 mg by mouth once daily.       Review of patient's allergies indicates:   Allergen Reactions    Latex, natural rubber Swelling and Rash        Family History       Problem Relation (Age of Onset)    Diabetes Mother, Father    Multiple sclerosis Maternal Aunt    No Known Problems Brother, Sister    Ovarian cancer Mother, Maternal Aunt          Tobacco Use    Smoking status: Never    Smokeless tobacco: Never   Substance and Sexual Activity    Alcohol use: Not Currently     Alcohol/week: 1.0 standard drink     Types: 1 Glasses of wine per week     Comment: socially    Drug  use: Yes     Types: Marijuana     Comment: Occassionally     Sexual activity: Yes     Partners: Male     Review of Systems   Gastrointestinal:  Positive for abdominal pain.   Genitourinary:  Negative for vaginal bleeding and vaginal discharge.   All other systems reviewed and are negative.   Objective:     Vital Signs (Most Recent):  Temp: 98.4 °F (36.9 °C) (10/17/22 2200)  Pulse: 79 (10/18/22 0210)  Resp: 18 (10/17/22 2210)  BP: 113/73 (10/18/22 0210)  SpO2: 96 % (10/18/22 0210) Vital Signs (24h Range):  Temp:  [98.4 °F (36.9 °C)] 98.4 °F (36.9 °C)  Pulse:  [] 79  Resp:  [18] 18  SpO2:  [95 %-100 %] 96 %  BP: (113-129)/(73-83) 113/73        There is no height or weight on file to calculate BMI.    FHT: 145 Cat 1 (reassuring)  TOCO:  Q 2-4 minutes    Physical Exam:   Constitutional: She is oriented to person, place, and time. Vital signs are normal. She appears well-developed and well-nourished. She is cooperative.    HENT:   Head: Normocephalic.      Cardiovascular:  Normal rate, regular rhythm and normal heart sounds.             Pulmonary/Chest: Effort normal.        Abdominal: Soft.   Gravid, non-tender     Genitourinary:    Vagina and uterus normal.             Musculoskeletal: Normal range of motion and moves all extremeties.       Neurological: She is alert and oriented to person, place, and time. She has normal strength.    Skin: Skin is warm and dry. Capillary refill takes less than 2 seconds.    Psychiatric: She has a normal mood and affect. Her speech is normal and behavior is normal. Judgment and thought content normal.     Cervix: per RN, no change after 4 hours of observation  Dilation:  3  Effacement:  70  Station: -3  Presentation: Vertex     Significant Labs:  Lab Results   Component Value Date    GROUPTRH A POS 03/15/2022    HEPBSAG Negative 03/15/2022    STREPBCULT (A) 10/06/2022     STREPTOCOCCUS AGALACTIAE (GROUP B)  In case of Penicillin allergy, call lab for further  testing.  Beta-hemolytic streptococci are routinely susceptible to   penicillins,cephalosporins and carbapenems.         I have personallly reviewed all pertinent lab results from the last 24 hours.  Recent Lab Results       None

## 2022-10-18 NOTE — PROCEDURES
Gayla Montana is a 27 y.o. female patient.    Temp: 98.4 °F (36.9 °C) (10/17/22 2200)  Pulse: 79 (10/18/22 0210)  Resp: 18 (10/17/22 2210)  BP: 113/73 (10/18/22 0210)  SpO2: 96 % (10/18/22 0210)       Obtain Fetal nonstress test (NST)    Date/Time: 10/18/2022 2:24 AM  Performed by: Beti Wilkins CNM  Authorized by: Beti Wilkins CNM     Nonstress Test:     Variability:  6-25 BPM    Decelerations:  None    Accelerations:  15 bpm    Baseline:  140    Uterine Irritability: No      Contractions:  Regular    Contraction Frequency:  2-4  Biophysical Profile:     Nonstress Test Interpretation: reactive      Overall Impression:  Reassuring  Post-procedure:     Patient tolerance:  Patient tolerated the procedure well with no immediate complications    10/18/2022

## 2022-10-18 NOTE — DISCHARGE SUMMARY
[facility]  Obstetrics  Discharge Summary      Patient Name: aGyla Montana  MRN: [id number]  Admission Date: 10/17/2022  Hospital Length of Stay: 0 days  Discharge Date and Time:  10/18/2022 2:23 AM  Attending Physician: Barbi Beth MD   Discharging Provider: Beti Wilkins CNM   Primary Care Provider: Primary Doctor No    HPI: 26yo  EGA 37w2d presents to  with c/o regular contractions. Denies VB or LOF. Reports good fetal movement.      FHT: 140 Cat 1 (reassuring)  TOCO:  Q 2-4 minutes    * No surgery found *     Hospital Course:   OB triage, NST reactive and reassuring  Regular contractions  No cervical  change over 4 hours  Offered therapeutic rest but patient declines, comfortable going home.  Will d/c home and f/u in clinic on Thursday  Labor precautions and FKC discussed           Final Active Diagnoses:    Diagnosis Date Noted POA    PRINCIPAL PROBLEM:  Threatened labor at term [O47.9] 10/18/2022 Yes      Problems Resolved During this Admission:        Immunizations     None          This patient has no babies on file.  Pending Diagnostic Studies:     None          Discharged Condition: good    Disposition: Home or Self Care    Follow Up:   Follow-up Information     Marian Lange CNM Follow up in 1 week(s).    Specialty: Obstetrics and Gynecology  Why: routine prenatal visit  Contact information:  [id number]  [place] [address] [phone]                       Patient Instructions:      Diet Adult Regular     Notify your health care provider if you experience any of the following:  temperature >100.4     Notify your health care provider if you experience any of the following:  persistent nausea and vomiting or diarrhea     Notify your health care provider if you experience any of the following:  severe uncontrolled pain     Notify your health care provider if you experience any of the following:  difficulty breathing or increased cough     Notify your  health care provider if you experience any of the following:  severe persistent headache     Notify your health care provider if you experience any of the following:  worsening rash     Notify your health care provider if you experience any of the following:  persistent dizziness, light-headedness, or visual disturbances     Notify your health care provider if you experience any of the following:  increased confusion or weakness     Activity as tolerated     Medications:  Current Discharge Medication List      CONTINUE these medications which have NOT CHANGED    Details   hydrOXYzine pamoate (VISTARIL) 50 MG Cap TAKE 1 CAPSULE BY MOUTH ONCE DAILY AS NEEDED  Qty: 30 capsule, Refills: 0      pantoprazole (PROTONIX) 40 MG tablet Take 40 mg by mouth once daily.      PNV no.95/ferrous fum/folic ac (PRENATAL ORAL) Take by mouth.      sertraline (ZOLOFT) 50 MG tablet Take 50 mg by mouth once daily.             Beti Wilkins CNM  Obstetrics  [facility]

## 2022-10-18 NOTE — ASSESSMENT & PLAN NOTE
OB triage, NST reactive and reassuring  Regular contractions  No cervical  change over 4 hours  Offered therapeutic rest but patient declines, comfortable going home.  Will d/c home and f/u in clinic on Thursday  Labor precautions and FKC discussed

## 2022-10-18 NOTE — H&P
[facility]  Obstetrics  History & Physical    Patient Name: Gayla Ni  MRN: [id number]  Admission Date: 10/17/2022  Primary Care Provider: Primary Doctor No    Subjective:     Principal Problem:Threatened labor at term    History of Present Illness:  26yo  EGA 37w2d presents to LD with c/o regular contractions. Denies VB or LOF. Reports good fetal movement.      Obstetric HPI:    This pregnancy has been complicated by depression, hx IUGR baby    OB History    Para Term  AB Living   4 3 3 0 0 3   SAB IAB Ectopic Multiple Live Births   0 0 0 0 3      # Outcome Date GA Lbr Dennis/2nd Weight Sex Delivery Anes PTL Lv   4 Current            3 Term 21 37w0d  1.88 kg (4 lb 2.3 oz) F Vag-Spont EPI N TOMASA      Name: JAZMINE NI      Apgar1: 8  Apgar5: 9   2 Term 11/15/17 38w0d  3.225 kg (7 lb 1.8 oz) M Vag-Spont EPI N TOMASA      Name: CELI NI      Apgar1: 8  Apgar5: 9   1 Term 11/30/15 39w2d / 00:26 3.15 kg (6 lb 15.1 oz) F Vag-Spont EPI N TOMASA      Apgar1: 9  Apgar5: 9     Past Medical History:   Diagnosis Date    Abnormal Pap smear of cervix     MITZI 2    Anemia     Anxiety     Depression     Depression 3/15/2022    Headache(784.0)     Hemorrhoids     Inflammatory bowel disease     Postpartum depression      Past Surgical History:   Procedure Laterality Date    ADENOIDECTOMY      APPENDECTOMY      CERVICAL BIOPSY  W/ LOOP ELECTRODE EXCISION      FOOT SURGERY      TONSILLECTOMY         PTA Medications   Medication Sig    hydrOXYzine pamoate (VISTARIL) 50 MG Cap TAKE 1 CAPSULE BY MOUTH ONCE DAILY AS NEEDED    pantoprazole (PROTONIX) 40 MG tablet Take 40 mg by mouth once daily.    PNV no.95/ferrous fum/folic ac (PRENATAL ORAL) Take by mouth.    sertraline (ZOLOFT) 50 MG tablet Take 50 mg by mouth once daily.       Review of patient's allergies indicates:   Allergen Reactions    Latex, natural rubber Swelling and Rash        Family History        Problem Relation (Age of Onset)    Diabetes Mother, Father    Multiple sclerosis Maternal Aunt    No Known Problems Brother, Sister    Ovarian cancer Mother, Maternal Aunt          Tobacco Use    Smoking status: Never    Smokeless tobacco: Never   Substance and Sexual Activity    Alcohol use: Not Currently     Alcohol/week: 1.0 standard drink     Types: 1 Glasses of wine per week     Comment: socially    Drug use: Yes     Types: Marijuana     Comment: Occassionally     Sexual activity: Yes     Partners: Male     Review of Systems   Gastrointestinal:  Positive for abdominal pain.   Genitourinary:  Negative for vaginal bleeding and vaginal discharge.   All other systems reviewed and are negative.   Objective:     Vital Signs (Most Recent):  Temp: 98.4 °F (36.9 °C) (10/17/22 2200)  Pulse: 79 (10/18/22 0210)  Resp: 18 (10/17/22 2210)  BP: 113/73 (10/18/22 0210)  SpO2: 96 % (10/18/22 0210) Vital Signs (24h Range):  Temp:  [98.4 °F (36.9 °C)] 98.4 °F (36.9 °C)  Pulse:  [] 79  Resp:  [18] 18  SpO2:  [95 %-100 %] 96 %  BP: (113-129)/(73-83) 113/73        There is no height or weight on file to calculate BMI.    FHT: 145 Cat 1 (reassuring)  TOCO:  Q 2-4 minutes    Physical Exam:   Constitutional: She is oriented to person, place, and time. Vital signs are normal. She appears well-developed and well-nourished. She is cooperative.    HENT:   Head: Normocephalic.      Cardiovascular:  Normal rate, regular rhythm and normal heart sounds.             Pulmonary/Chest: Effort normal.        Abdominal: Soft.   Gravid, non-tender     Genitourinary:    Vagina and uterus normal.             Musculoskeletal: Normal range of motion and moves all extremeties.       Neurological: She is alert and oriented to person, place, and time. She has normal strength.    Skin: Skin is warm and dry. Capillary refill takes less than 2 seconds.    Psychiatric: She has a normal mood and affect. Her speech is normal and behavior is normal.  Judgment and thought content normal.     Cervix: per RN, no change after 4 hours of observation  Dilation:  3  Effacement:  70  Station: -3  Presentation: Vertex     Significant Labs:  Lab Results   Component Value Date    GROUPTRH A POS 03/15/2022    HEPBSAG Negative 03/15/2022    STREPBCULT (A) 10/06/2022     STREPTOCOCCUS AGALACTIAE (GROUP B)  In case of Penicillin allergy, call lab for further testing.  Beta-hemolytic streptococci are routinely susceptible to   penicillins,cephalosporins and carbapenems.         I have personallly reviewed all pertinent lab results from the last 24 hours.  Recent Lab Results       None          Assessment/Plan:     27 y.o. female  at 37w2d for:    * Threatened labor at term  OB triage, NST reactive and reassuring  Regular contractions  No cervical  change over 4 hours  Offered therapeutic rest but patient declines, comfortable going home.  Will d/c home and f/u in clinic on Thursday  Labor precautions and C discussed        Beti Wilkins CNM  Obstetrics  [facility]

## 2022-10-18 NOTE — DISCHARGE INSTRUCTIONS

## 2022-10-20 ENCOUNTER — ROUTINE PRENATAL (OUTPATIENT)
Dept: OBSTETRICS AND GYNECOLOGY | Facility: CLINIC | Age: 27
End: 2022-10-20
Payer: MEDICAID

## 2022-10-20 VITALS
SYSTOLIC BLOOD PRESSURE: 120 MMHG | DIASTOLIC BLOOD PRESSURE: 70 MMHG | WEIGHT: 172.38 LBS | BODY MASS INDEX: 31.53 KG/M2

## 2022-10-20 DIAGNOSIS — Z34.93 NORMAL PREGNANCY IN THIRD TRIMESTER: Primary | ICD-10-CM

## 2022-10-20 DIAGNOSIS — Z87.59 HISTORY OF PRIOR PREGNANCY WITH IUGR NEWBORN: ICD-10-CM

## 2022-10-20 PROCEDURE — 99213 PR OFFICE/OUTPT VISIT, EST, LEVL III, 20-29 MIN: ICD-10-PCS | Mod: TH,S$PBB,, | Performed by: ADVANCED PRACTICE MIDWIFE

## 2022-10-20 PROCEDURE — 99213 OFFICE O/P EST LOW 20 MIN: CPT | Mod: PBBFAC,PO | Performed by: ADVANCED PRACTICE MIDWIFE

## 2022-10-20 PROCEDURE — 99213 OFFICE O/P EST LOW 20 MIN: CPT | Mod: TH,S$PBB,, | Performed by: ADVANCED PRACTICE MIDWIFE

## 2022-10-20 PROCEDURE — 99999 PR PBB SHADOW E&M-EST. PATIENT-LVL III: ICD-10-PCS | Mod: PBBFAC,,, | Performed by: ADVANCED PRACTICE MIDWIFE

## 2022-10-20 PROCEDURE — 99999 PR PBB SHADOW E&M-EST. PATIENT-LVL III: CPT | Mod: PBBFAC,,, | Performed by: ADVANCED PRACTICE MIDWIFE

## 2022-10-20 NOTE — PROGRESS NOTES
27 y.o. female  at 37w4d   Reports + FM, denies VB, LOF or regular CTX  Doing well without concerns     Reason Labor and delivery the visit secondary to contractions.  Category 1 strip with reactiveNST discharged home with labor precautions     PCR from last visit 0.18.  Normotensive  TW lbs   Cervix is favorable, soft and effacing with dilation  Reviewed warning signs, normal FKCs, labor precautions and how/when to call.  RTC x 1 wks, call or present sooner prn.     I spent a total of 20 minutes on the day of the visit.This includes face to face time and non-face to face time preparing to see the patient (eg, review of tests), Obtaining and/or reviewing separately obtained history, Documenting clinical information in the electronic or other health record, Independently interpreting resultsand communicating results to the patient/family/caregiver, or Care coordination.

## 2022-10-20 NOTE — PATIENT INSTRUCTIONS
Patient Education       Pregnancy - The Ninth Month   About this topic   It is important for you to learn how to take care of yourself to help you have a healthy baby and safe delivery. It is good to have health care throughout your pregnancy.  The ninth month of your pregnancy starts around week 37 and lasts through delivery. By knowing how far along you are, you can learn what is normal for this stage of your pregnancy and plan for what is next.  General   Your body   During the ninth month of your pregnancy, here are some things you can expect.  You may:  Lose your mucous plug as your cervix starts to get thinner and dilate.  Notice a small amount of streaky red or pink spotting.  Your doctor may discuss stripping your membranes to help the labor progress.  Go into labor any time. Most women deliver their baby between 38 and 42 weeks.  Notice your belly button sticks out. It should go back to normal after you give birth.  Have a bit of extra energy as you get ready for your baby  Notice your breasts are leaking more. This is normal as your body is making the first milk you can feed your baby.  Have tests to check on how your baby is doing. Your doctor will most likely not let you be pregnant for more than 42 weeks.  Not gain any weight this month. Some mothers even lose 1 to 2 pounds (.45 to .9 kg).  Your baby's growth and development:  Your baby has been busy swallowing fluid and building up waste products for their first bowel movement.  Your baby may start sucking their thumb.  They may come out with dry skin, bruises, or a misshapen head. Living in a watery fluid and going through labor is tough on your baby too. These are all normal and will change in the first weeks of life.  Your baby may have lots of hair on their head or not very much at all. Long fingernails are normal.  Your baby is about 20 inches (51 cm) long and weighs about 7 1/2 pounds (3,400 gm). Your baby is about the size of a  watermelon.  Things to Think About   Avoid alcohol, drugs, tobacco products, and second hand smoke.  Talk to your doctor if you plan to travel or get on a plane.  Have your bag packed so you are ready for delivery.  Are you planning a natural childbirth or thinking about an epidural? Know things you can do to help cope with labor pain.  If you are having a boy, decide if you want to have him circumcised.  Be sure the car seat is installed the right way so you are ready to bring your baby home.  When do I need to call the doctor?   Contractions every 5 minutes or more often that do not go away with rest, drinking water, or position changes  Headache that does not go away; blurry vision; seeing spots or halos; increase in swelling in your hands, feet, or face; and pain under your ribs on the right side  Low, dull back pain that does not go away  Pressure in your pelvis that feels like your baby is pushing down  A gush or constant trickle of watery or bloody fluid leaking from your vagina  Little to no movement felt by baby in 2 hours. Your baby should be moving at least 10 times every 2 hours.  Vaginal bleeding with or without pain  Fever of 100.4°F (38°C) or higher  After a car accident, fall, or any trauma to your belly  Having thoughts of harming yourself or others, or do not feel safe at home  Where can I learn more?   American Academy of Family Physicians  https://familydoctor.org/changes-in-your-body-during-pregnancy-third-trimester/   Kids Health  http://kidshealth.org/en/parents/pregnancy-calendar-intro.html?WT.ac=p-jeannie   Office on Womens Health  https://www.womenshealth.gov/pregnancy/youre-pregnant-now-what/stages-pregnancy   Last Reviewed Date   2020-05-06  Consumer Information Use and Disclaimer   This information is not specific medical advice and does not replace information you receive from your health care provider. This is only a brief summary of general information. It does NOT include all  information about conditions, illnesses, injuries, tests, procedures, treatments, therapies, discharge instructions or life-style choices that may apply to you. You must talk with your health care provider for complete information about your health and treatment options. This information should not be used to decide whether or not to accept your health care providers advice, instructions or recommendations. Only your health care provider has the knowledge and training to provide advice that is right for you.  Copyright   Copyright © 2021 UpToDate, Inc. and its affiliates and/or licensors. All rights reserved.  Patient Education       Fetal Movement   About this topic   Feeling your baby move for the first time is a good sign that your baby is doing well. You may begin to feel these movements between the 18th and 25th weeks of your pregnancy. If this is your first time being pregnant, it may be closer to 25 weeks. Your baby has been moving around before this, but the kicks have not been strong enough for you to feel. During the first weeks of feeling movement, you may start to see a pattern during the day when your baby is most active. You can track your baby's kicks each day at home. This is also known as kick counting. It is a good way to check on your baby's movements and well being.  Most often, fetal kick counting is used in high-risk pregnancies. It may be useful for all pregnancies. Counting and writing down your baby's kicks, jabs, twists, flutters, rolls, turns, flips, and swishes may help find a problem that needs more evaluation. The American College of Obstetricians and Gynecologists, or ACOG, suggests that you record how much time it takes you to feel 10 of these movements. Ideally, you should be able to feel 10 movements within 2 hours. Many people will track these movements in much less time.  General   How to Track Your Baby's Kick Counts   Most often your doctor will want you to wait until the 28th  "to 30th weeks of your pregnancy to start kick counting. Here are some tips to help you get started.  Find the time of day when your baby is most active. For some people, this is right after eating. Others find their baby moving a lot after they have been exercising or more active. Some babies are more active in the evenings when your blood sugar starts to lower.  Try to count kicks at about the same time each day.  Before you start counting, have something to eat or drink. Also take a short walk or do some light activity.  Choose a quiet place where you can focus on your baby's movements. Also get in a comfortable position. Try and lie on one side or the other. You may need to change positions until you find one that works best for you and your baby.  Keep a notebook to track your baby's kicks. Your doctor may give you a chart to use or you can make your own. Write down the date, time you started counting, and the time of each "kick" during a 2-hour period until you have felt 10 kicks.  Once you have recorded 10 kicks within 2 hours you can stop counting.  If you are not able to record 10 movements over 2 hours you should get up and move around or eat something and try again.  If you are not able to record 10 movements over 2 hours the second time, call your doctor. They may want you to go to the hospital to get your baby checked.  When do I need to call the doctor?   You have felt less than 10 movements over a period of 2 hours.  It takes longer each day to record 10 movements.  There is a big change in the pattern of movements you are writing down.  You feel no movement for 2 hours even after eating a snack, light activity, and position changes.  Teach Back: Helping You Understand   The Teach Back Method helps you understand the information we are giving you. After you talk with the staff, tell them in your own words what you learned. This helps to make sure the staff has described each thing clearly. It also " "helps to explain things that may have been confusing. Before going home, make sure you can do these:  I can tell you about feeling my baby move.  I can tell you how I will track my babys kicks.  I can tell you what I will do if I feel less than 10 movements in 2 hours, it takes longer to feel my baby move 10 times, or there is a big change in how my baby is moving.  Last Reviewed Date   2021-10-08  Consumer Information Use and Disclaimer   This information is not specific medical advice and does not replace information you receive from your health care provider. This is only a brief summary of general information. It does NOT include all information about conditions, illnesses, injuries, tests, procedures, treatments, therapies, discharge instructions or life-style choices that may apply to you. You must talk with your health care provider for complete information about your health and treatment options. This information should not be used to decide whether or not to accept your health care providers advice, instructions or recommendations. Only your health care provider has the knowledge and training to provide advice that is right for you.  Copyright   Copyright ©  UpToDate, Inc. and its affiliates and/or licensors. All rights reserved.  Patient Education       How to Tell When Labor Starts   The Basics   Written by the doctors and editors at [facility]   What is labor? -- Labor is the way your body prepares to give birth when you are pregnant. Labor usually starts on its own between 37 and 42 weeks of pregnancy. Your "due date" is at 40 weeks.  A pregnancy that lasts 37 to 42 weeks is called a "term" pregnancy. When labor starts before 37 weeks, doctors call it "" labor.  What are the signs that labor is starting? -- The different signs that labor is starting can include the following:  The baby moves lower (or "drops") in your belly.  You have increased vaginal discharge that is thick, mucus-like, or " "slightly bloody. ("Vaginal discharge" is the term doctors use to describe the fluid that comes out of the vagina.) The increased vaginal discharge is sometimes called a "mucus plug" or a "bloody show."  Your "water breaks." During pregnancy, your baby is in a sac in your uterus and surrounded by a fluid called "amniotic fluid." This sac typically breaks open sometime before your baby is born. When it breaks open, the fluid inside comes out of your vagina. This can feel like a big gush or just a trickle of fluid.  You have low back pain or belly cramps.  You start having contractions. During a contraction, the uterus tightens. This can be painful and make your belly feel hard. After a contraction, the uterus relaxes and the pain goes away. Some people have "Kingfisher Cruz contractions" or "false-labor contractions." These feel like contractions, but they are not true contractions. They do not mean that you are in labor.  How can I tell if I'm having true contractions? -- It can be hard to tell if you are having true contractions or Dev Cruz contractions. But here are some ways to help tell the difference.  True contractions come every few minutes and get more frequent over time. King[place]isher Cruz contractions can come every few minutes, but they don't get more frequent over time.  True contractions don't go away, even when you rest. Kingfisher Cruz contractions usually go away when you rest.  True contractions will get stronger and more painful over time. Kingfisher Cruz contractions usually don't get stronger or more painful over time.  True contractions might be felt in your back and front. Dev Cruz contractions are usually only in front.  If you are still not sure whether you are having true contractions, call your doctor or midwife.  What should I do if I start having contractions? -- If you start having contractions, you should time them to see how far apart they are. That way, you can tell if they get more " "frequent.  You can time your contractions by keeping track of the time when each contraction starts. If you have a clock with a second hand or a timer on your smartphone, you can also time how long each contraction lasts. Your doctor or midwife will want to know how far apart your contractions are and how long they last.  When should I call my doctor or midwife? -- Call your doctor or midwife if you think you are in labor. You should also call if any of the following things happen:  You have blood, mucus, or fluid leaking from your vagina.  You have 6 or more contractions in 1 hour. (That means your contractions are 10 minutes apart or less.)  Your contractions are getting stronger and are painful.  Your doctor or midwife will probably want to see you to do an exam. To tell if you are in labor, they will check your cervix to see if it is opening ("dilated") and thinning out. They will see how frequent your contractions are. They might also do other tests.  What if my labor starts too soon? -- If you start having any symptoms of labor before 37 weeks, call your doctor right away. They might want to give you medicine to try to stop your labor.  What if my labor doesn't start on its own? -- If your labor doesn't start on its own, your doctor will talk to you about your options. They might try to start your labor with medicines. This is called "inducing labor."  How long will my labor last? -- If it's your first baby, your labor will probably last for many hours. If it's not your first baby, your labor will probably be shorter.  All topics are updated as new evidence becomes available and our peer review process is complete.  This topic retrieved from [employer] on: Sep 21, 2021.  Topic 00848 Version 9.0  Release: 29.4.2 - C29.263  © 2021 UpToDate, Inc. and/or its affiliates. All rights reserved.  Consumer Information Use and Disclaimer   This information is not specific medical advice and does not replace information you " receive from your health care provider. This is only a brief summary of general information. It does NOT include all information about conditions, illnesses, injuries, tests, procedures, treatments, therapies, discharge instructions or life-style choices that may apply to you. You must talk with your health care provider for complete information about your health and treatment options. This information should not be used to decide whether or not to accept your health care provider's advice, instructions or recommendations. Only your health care provider has the knowledge and training to provide advice that is right for you. The use of this information is governed by the [employer] End User License Agreement, available at https://www.[employer]/en/solutions/Docalytics/about/felecia.The use of [employer] content is governed by the [employer] Terms of Use. ©2021 [employer] All rights reserved.  Copyright   © 2021 [employer] and/or its affiliates. All rights reserved.

## 2022-10-24 ENCOUNTER — HOSPITAL ENCOUNTER (INPATIENT)
Facility: HOSPITAL | Age: 27
LOS: 2 days | Discharge: HOME | End: 2022-10-26
Attending: OBSTETRICS & GYNECOLOGY | Admitting: OBSTETRICS & GYNECOLOGY
Payer: MEDICAID

## 2022-10-24 DIAGNOSIS — O47.9 THREATENED LABOR AT TERM: ICD-10-CM

## 2022-10-24 DIAGNOSIS — Z37.9 NORMAL LABOR: ICD-10-CM

## 2022-10-24 PROCEDURE — 11000001 HC ACUTE MED/SURG PRIVATE ROOM

## 2022-10-24 PROCEDURE — 99211 OFF/OP EST MAY X REQ PHY/QHP: CPT | Mod: 25

## 2022-10-24 PROCEDURE — 72100002 HC LABOR CARE, 1ST 8 HOURS

## 2022-10-24 PROCEDURE — 85025 COMPLETE CBC W/AUTO DIFF WBC: CPT

## 2022-10-24 PROCEDURE — 86850 RBC ANTIBODY SCREEN: CPT

## 2022-10-24 PROCEDURE — 59025 FETAL NON-STRESS TEST: CPT

## 2022-10-24 RX ORDER — OXYTOCIN/RINGER'S LACTATE 30/500 ML
95 PLASTIC BAG, INJECTION (ML) INTRAVENOUS ONCE
Status: DISCONTINUED | OUTPATIENT
Start: 2022-10-25 | End: 2022-10-25

## 2022-10-24 RX ORDER — DIPHENOXYLATE HYDROCHLORIDE AND ATROPINE SULFATE 2.5; .025 MG/1; MG/1
1 TABLET ORAL 4 TIMES DAILY PRN
Status: DISCONTINUED | OUTPATIENT
Start: 2022-10-25 | End: 2022-10-25

## 2022-10-24 RX ORDER — TRANEXAMIC ACID 100 MG/ML
1000 INJECTION, SOLUTION INTRAVENOUS ONCE AS NEEDED
Status: DISCONTINUED | OUTPATIENT
Start: 2022-10-24 | End: 2022-10-25

## 2022-10-24 RX ORDER — SIMETHICONE 80 MG
1 TABLET,CHEWABLE ORAL 4 TIMES DAILY PRN
Status: DISCONTINUED | OUTPATIENT
Start: 2022-10-25 | End: 2022-10-25

## 2022-10-24 RX ORDER — CARBOPROST TROMETHAMINE 250 UG/ML
250 INJECTION, SOLUTION INTRAMUSCULAR
Status: DISCONTINUED | OUTPATIENT
Start: 2022-10-25 | End: 2022-10-25

## 2022-10-24 RX ORDER — SODIUM CHLORIDE, SODIUM LACTATE, POTASSIUM CHLORIDE, CALCIUM CHLORIDE 600; 310; 30; 20 MG/100ML; MG/100ML; MG/100ML; MG/100ML
INJECTION, SOLUTION INTRAVENOUS CONTINUOUS
Status: DISCONTINUED | OUTPATIENT
Start: 2022-10-25 | End: 2022-10-25

## 2022-10-24 RX ORDER — MISOPROSTOL 200 UG/1
800 TABLET ORAL
Status: DISCONTINUED | OUTPATIENT
Start: 2022-10-25 | End: 2022-10-25

## 2022-10-24 RX ORDER — LIDOCAINE HYDROCHLORIDE 10 MG/ML
10 INJECTION, SOLUTION EPIDURAL; INFILTRATION; INTRACAUDAL; PERINEURAL ONCE AS NEEDED
Status: DISCONTINUED | OUTPATIENT
Start: 2022-10-25 | End: 2022-10-25

## 2022-10-24 RX ORDER — OXYTOCIN/RINGER'S LACTATE 30/500 ML
334 PLASTIC BAG, INJECTION (ML) INTRAVENOUS ONCE
Status: COMPLETED | OUTPATIENT
Start: 2022-10-24 | End: 2022-10-25

## 2022-10-24 RX ORDER — CALCIUM CARBONATE 200(500)MG
500 TABLET,CHEWABLE ORAL 3 TIMES DAILY PRN
Status: DISCONTINUED | OUTPATIENT
Start: 2022-10-25 | End: 2022-10-25

## 2022-10-24 RX ORDER — PROCHLORPERAZINE EDISYLATE 5 MG/ML
5 INJECTION INTRAMUSCULAR; INTRAVENOUS EVERY 6 HOURS PRN
Status: DISCONTINUED | OUTPATIENT
Start: 2022-10-25 | End: 2022-10-25

## 2022-10-24 RX ORDER — ONDANSETRON 8 MG/1
8 TABLET, ORALLY DISINTEGRATING ORAL EVERY 8 HOURS PRN
Status: DISCONTINUED | OUTPATIENT
Start: 2022-10-24 | End: 2022-10-25

## 2022-10-24 RX ORDER — ACETAMINOPHEN 500 MG
500 TABLET ORAL EVERY 6 HOURS PRN
Status: DISCONTINUED | OUTPATIENT
Start: 2022-10-24 | End: 2022-10-25

## 2022-10-24 RX ORDER — METHYLERGONOVINE MALEATE 0.2 MG/ML
200 INJECTION INTRAVENOUS
Status: DISCONTINUED | OUTPATIENT
Start: 2022-10-25 | End: 2022-10-25

## 2022-10-24 RX ORDER — SODIUM CHLORIDE, SODIUM LACTATE, POTASSIUM CHLORIDE, CALCIUM CHLORIDE 600; 310; 30; 20 MG/100ML; MG/100ML; MG/100ML; MG/100ML
INJECTION, SOLUTION INTRAVENOUS CONTINUOUS
Status: DISCONTINUED | OUTPATIENT
Start: 2022-10-24 | End: 2022-10-25

## 2022-10-24 RX ORDER — ONDANSETRON 8 MG/1
8 TABLET, ORALLY DISINTEGRATING ORAL EVERY 8 HOURS PRN
Status: DISCONTINUED | OUTPATIENT
Start: 2022-10-25 | End: 2022-10-25

## 2022-10-24 RX ORDER — PROCHLORPERAZINE EDISYLATE 5 MG/ML
5 INJECTION INTRAMUSCULAR; INTRAVENOUS EVERY 6 HOURS PRN
Status: DISCONTINUED | OUTPATIENT
Start: 2022-10-24 | End: 2022-10-25

## 2022-10-25 ENCOUNTER — ANESTHESIA EVENT (OUTPATIENT)
Dept: OBSTETRICS AND GYNECOLOGY | Facility: HOSPITAL | Age: 27
End: 2022-10-25
Payer: MEDICAID

## 2022-10-25 ENCOUNTER — ANESTHESIA (OUTPATIENT)
Dept: OBSTETRICS AND GYNECOLOGY | Facility: HOSPITAL | Age: 27
End: 2022-10-25
Payer: MEDICAID

## 2022-10-25 PROBLEM — Z37.9 NORMAL LABOR: Status: RESOLVED | Noted: 2022-10-24 | Resolved: 2022-10-25

## 2022-10-25 LAB
ABO + RH BLD: NORMAL
AMPHET+METHAMPHET UR QL: NEGATIVE
BARBITURATES UR QL SCN>200 NG/ML: NEGATIVE
BASOPHILS # BLD AUTO: 0.02 K/UL (ref 0–0.2)
BASOPHILS NFR BLD: 0.2 % (ref 0–1.9)
BENZODIAZ UR QL SCN>200 NG/ML: NEGATIVE
BLD GP AB SCN CELLS X3 SERPL QL: NORMAL
BZE UR QL SCN: NEGATIVE
CANNABINOIDS UR QL SCN: NEGATIVE
CREAT UR-MCNC: 76.6 MG/DL (ref 15–325)
DIFFERENTIAL METHOD: ABNORMAL
EOSINOPHIL # BLD AUTO: 0 K/UL (ref 0–0.5)
EOSINOPHIL NFR BLD: 0.2 % (ref 0–8)
ERYTHROCYTE [DISTWIDTH] IN BLOOD BY AUTOMATED COUNT: 13.7 % (ref 11.5–14.5)
HCT VFR BLD AUTO: 28.8 % (ref 37–48.5)
HGB BLD-MCNC: 9.2 G/DL (ref 12–16)
IMM GRANULOCYTES # BLD AUTO: 0.05 K/UL (ref 0–0.04)
IMM GRANULOCYTES NFR BLD AUTO: 0.4 % (ref 0–0.5)
LYMPHOCYTES # BLD AUTO: 1.6 K/UL (ref 1–4.8)
LYMPHOCYTES NFR BLD: 12.6 % (ref 18–48)
MCH RBC QN AUTO: 25.6 PG (ref 27–31)
MCHC RBC AUTO-ENTMCNC: 31.9 G/DL (ref 32–36)
MCV RBC AUTO: 80 FL (ref 82–98)
METHADONE UR QL SCN>300 NG/ML: NEGATIVE
MONOCYTES # BLD AUTO: 0.7 K/UL (ref 0.3–1)
MONOCYTES NFR BLD: 5.7 % (ref 4–15)
NEUTROPHILS # BLD AUTO: 10.5 K/UL (ref 1.8–7.7)
NEUTROPHILS NFR BLD: 80.9 % (ref 38–73)
NRBC BLD-RTO: 0 /100 WBC
OPIATES UR QL SCN: NEGATIVE
PCP UR QL SCN>25 NG/ML: NEGATIVE
PLATELET # BLD AUTO: 188 K/UL (ref 150–450)
PMV BLD AUTO: 12.3 FL (ref 9.2–12.9)
RBC # BLD AUTO: 3.6 M/UL (ref 4–5.4)
SARS-COV-2 RDRP RESP QL NAA+PROBE: NEGATIVE
TOXICOLOGY INFORMATION: NORMAL
WBC # BLD AUTO: 12.93 K/UL (ref 3.9–12.7)

## 2022-10-25 PROCEDURE — 25000003 PHARM REV CODE 250

## 2022-10-25 PROCEDURE — 63600175 PHARM REV CODE 636 W HCPCS: Performed by: NURSE ANESTHETIST, CERTIFIED REGISTERED

## 2022-10-25 PROCEDURE — 11000001 HC ACUTE MED/SURG PRIVATE ROOM

## 2022-10-25 PROCEDURE — 25000003 PHARM REV CODE 250: Performed by: NURSE ANESTHETIST, CERTIFIED REGISTERED

## 2022-10-25 PROCEDURE — C1751 CATH, INF, PER/CENT/MIDLINE: HCPCS | Performed by: STUDENT IN AN ORGANIZED HEALTH CARE EDUCATION/TRAINING PROGRAM

## 2022-10-25 PROCEDURE — 59409 OBSTETRICAL CARE: CPT | Mod: AT,,,

## 2022-10-25 PROCEDURE — U0002 COVID-19 LAB TEST NON-CDC: HCPCS

## 2022-10-25 PROCEDURE — 62326 NJX INTERLAMINAR LMBR/SAC: CPT | Performed by: NURSE ANESTHETIST, CERTIFIED REGISTERED

## 2022-10-25 PROCEDURE — 51701 INSERT BLADDER CATHETER: CPT

## 2022-10-25 PROCEDURE — 80307 DRUG TEST PRSMV CHEM ANLYZR: CPT | Performed by: OBSTETRICS & GYNECOLOGY

## 2022-10-25 PROCEDURE — 59409 PR OBSTETRICAL CARE,VAG DELIV ONLY: ICD-10-PCS | Mod: AT,,,

## 2022-10-25 PROCEDURE — 63600175 PHARM REV CODE 636 W HCPCS

## 2022-10-25 PROCEDURE — 72200004 HC VAGINAL DELIVERY LEVEL I

## 2022-10-25 RX ORDER — LIDOCAINE HYDROCHLORIDE AND EPINEPHRINE 15; 5 MG/ML; UG/ML
INJECTION, SOLUTION EPIDURAL
Status: DISCONTINUED | OUTPATIENT
Start: 2022-10-25 | End: 2022-10-25

## 2022-10-25 RX ORDER — ACETAMINOPHEN 325 MG/1
650 TABLET ORAL EVERY 6 HOURS PRN
Status: DISCONTINUED | OUTPATIENT
Start: 2022-10-25 | End: 2022-10-26 | Stop reason: HOSPADM

## 2022-10-25 RX ORDER — LIDOCAINE HYDROCHLORIDE 10 MG/ML
10 INJECTION INFILTRATION; PERINEURAL ONCE AS NEEDED
Status: CANCELLED | OUTPATIENT
Start: 2022-10-25 | End: 2034-03-22

## 2022-10-25 RX ORDER — DIPHENHYDRAMINE HYDROCHLORIDE 50 MG/ML
25 INJECTION INTRAMUSCULAR; INTRAVENOUS EVERY 4 HOURS PRN
Status: DISCONTINUED | OUTPATIENT
Start: 2022-10-25 | End: 2022-10-26 | Stop reason: HOSPADM

## 2022-10-25 RX ORDER — PROCHLORPERAZINE EDISYLATE 5 MG/ML
5 INJECTION INTRAMUSCULAR; INTRAVENOUS EVERY 6 HOURS PRN
Status: DISCONTINUED | OUTPATIENT
Start: 2022-10-25 | End: 2022-10-26 | Stop reason: HOSPADM

## 2022-10-25 RX ORDER — HYDROCODONE BITARTRATE AND ACETAMINOPHEN 5; 325 MG/1; MG/1
1 TABLET ORAL EVERY 4 HOURS PRN
Status: DISCONTINUED | OUTPATIENT
Start: 2022-10-25 | End: 2022-10-26 | Stop reason: HOSPADM

## 2022-10-25 RX ORDER — OXYTOCIN/RINGER'S LACTATE 30/500 ML
95 PLASTIC BAG, INJECTION (ML) INTRAVENOUS ONCE
Status: CANCELLED | OUTPATIENT
Start: 2022-10-25 | End: 2022-10-25

## 2022-10-25 RX ORDER — TRANEXAMIC ACID 100 MG/ML
1000 INJECTION, SOLUTION INTRAVENOUS ONCE AS NEEDED
Status: CANCELLED | OUTPATIENT
Start: 2022-10-25 | End: 2034-03-22

## 2022-10-25 RX ORDER — SODIUM CHLORIDE, SODIUM LACTATE, POTASSIUM CHLORIDE, CALCIUM CHLORIDE 600; 310; 30; 20 MG/100ML; MG/100ML; MG/100ML; MG/100ML
INJECTION, SOLUTION INTRAVENOUS CONTINUOUS
Status: CANCELLED | OUTPATIENT
Start: 2022-10-25

## 2022-10-25 RX ORDER — OXYTOCIN/RINGER'S LACTATE 30/500 ML
95 PLASTIC BAG, INJECTION (ML) INTRAVENOUS ONCE
Status: DISCONTINUED | OUTPATIENT
Start: 2022-10-25 | End: 2022-10-26 | Stop reason: HOSPADM

## 2022-10-25 RX ORDER — PROCHLORPERAZINE EDISYLATE 5 MG/ML
5 INJECTION INTRAMUSCULAR; INTRAVENOUS EVERY 6 HOURS PRN
Status: CANCELLED | OUTPATIENT
Start: 2022-10-25

## 2022-10-25 RX ORDER — ONDANSETRON 8 MG/1
8 TABLET, ORALLY DISINTEGRATING ORAL EVERY 8 HOURS PRN
Status: DISCONTINUED | OUTPATIENT
Start: 2022-10-25 | End: 2022-10-26 | Stop reason: HOSPADM

## 2022-10-25 RX ORDER — DIPHENHYDRAMINE HCL 25 MG
25 CAPSULE ORAL EVERY 4 HOURS PRN
Status: DISCONTINUED | OUTPATIENT
Start: 2022-10-25 | End: 2022-10-26 | Stop reason: HOSPADM

## 2022-10-25 RX ORDER — PRENATAL WITH FERROUS FUM AND FOLIC ACID 3080; 920; 120; 400; 22; 1.84; 3; 20; 10; 1; 12; 200; 27; 25; 2 [IU]/1; [IU]/1; MG/1; [IU]/1; MG/1; MG/1; MG/1; MG/1; MG/1; MG/1; UG/1; MG/1; MG/1; MG/1; MG/1
1 TABLET ORAL DAILY
Status: DISCONTINUED | OUTPATIENT
Start: 2022-10-25 | End: 2022-10-26 | Stop reason: HOSPADM

## 2022-10-25 RX ORDER — SIMETHICONE 80 MG
1 TABLET,CHEWABLE ORAL EVERY 6 HOURS PRN
Status: DISCONTINUED | OUTPATIENT
Start: 2022-10-25 | End: 2022-10-26 | Stop reason: HOSPADM

## 2022-10-25 RX ORDER — ROPIVACAINE HYDROCHLORIDE 2 MG/ML
INJECTION, SOLUTION EPIDURAL; INFILTRATION; PERINEURAL
Status: DISCONTINUED | OUTPATIENT
Start: 2022-10-25 | End: 2022-10-25

## 2022-10-25 RX ORDER — DOCUSATE SODIUM 100 MG/1
200 CAPSULE, LIQUID FILLED ORAL 2 TIMES DAILY PRN
Status: DISCONTINUED | OUTPATIENT
Start: 2022-10-25 | End: 2022-10-26 | Stop reason: HOSPADM

## 2022-10-25 RX ORDER — SIMETHICONE 80 MG
1 TABLET,CHEWABLE ORAL 4 TIMES DAILY PRN
Status: CANCELLED | OUTPATIENT
Start: 2022-10-25

## 2022-10-25 RX ORDER — DIPHENOXYLATE HYDROCHLORIDE AND ATROPINE SULFATE 2.5; .025 MG/1; MG/1
1 TABLET ORAL 4 TIMES DAILY PRN
Status: CANCELLED | OUTPATIENT
Start: 2022-10-25

## 2022-10-25 RX ORDER — SODIUM CHLORIDE 0.9 % (FLUSH) 0.9 %
10 SYRINGE (ML) INJECTION
Status: DISCONTINUED | OUTPATIENT
Start: 2022-10-25 | End: 2022-10-26 | Stop reason: HOSPADM

## 2022-10-25 RX ORDER — METHYLERGONOVINE MALEATE 0.2 MG/ML
200 INJECTION INTRAVENOUS
Status: CANCELLED | OUTPATIENT
Start: 2022-10-25

## 2022-10-25 RX ORDER — CALCIUM CARBONATE 200(500)MG
500 TABLET,CHEWABLE ORAL 3 TIMES DAILY PRN
Status: CANCELLED | OUTPATIENT
Start: 2022-10-25

## 2022-10-25 RX ORDER — ONDANSETRON 8 MG/1
8 TABLET, ORALLY DISINTEGRATING ORAL EVERY 8 HOURS PRN
Status: CANCELLED | OUTPATIENT
Start: 2022-10-25

## 2022-10-25 RX ORDER — CARBOPROST TROMETHAMINE 250 UG/ML
250 INJECTION, SOLUTION INTRAMUSCULAR
Status: CANCELLED | OUTPATIENT
Start: 2022-10-25

## 2022-10-25 RX ORDER — SODIUM CHLORIDE 9 MG/ML
INJECTION, SOLUTION INTRAVENOUS
Status: CANCELLED | OUTPATIENT
Start: 2022-10-25

## 2022-10-25 RX ORDER — MISOPROSTOL 200 UG/1
800 TABLET ORAL
Status: CANCELLED | OUTPATIENT
Start: 2022-10-25

## 2022-10-25 RX ORDER — IBUPROFEN 600 MG/1
600 TABLET ORAL EVERY 6 HOURS
Status: DISCONTINUED | OUTPATIENT
Start: 2022-10-25 | End: 2022-10-26 | Stop reason: HOSPADM

## 2022-10-25 RX ORDER — OXYTOCIN/RINGER'S LACTATE 30/500 ML
334 PLASTIC BAG, INJECTION (ML) INTRAVENOUS ONCE
Status: CANCELLED | OUTPATIENT
Start: 2022-10-25 | End: 2022-10-25

## 2022-10-25 RX ORDER — HYDROCORTISONE 25 MG/G
CREAM TOPICAL 3 TIMES DAILY PRN
Status: DISCONTINUED | OUTPATIENT
Start: 2022-10-25 | End: 2022-10-26 | Stop reason: HOSPADM

## 2022-10-25 RX ADMIN — SODIUM CHLORIDE, SODIUM LACTATE, POTASSIUM CHLORIDE, AND CALCIUM CHLORIDE: .6; .31; .03; .02 INJECTION, SOLUTION INTRAVENOUS at 12:10

## 2022-10-25 RX ADMIN — IBUPROFEN 600 MG: 600 TABLET, FILM COATED ORAL at 11:10

## 2022-10-25 RX ADMIN — ONDANSETRON 8 MG: 8 TABLET, ORALLY DISINTEGRATING ORAL at 03:10

## 2022-10-25 RX ADMIN — DEXTROSE 5 MILLION UNITS: 50 INJECTION, SOLUTION INTRAVENOUS at 12:10

## 2022-10-25 RX ADMIN — ROPIVACAINE HYDROCHLORIDE 12 ML/HR: 2 INJECTION, SOLUTION EPIDURAL; INFILTRATION at 12:10

## 2022-10-25 RX ADMIN — ROPIVACAINE HYDROCHLORIDE 4 ML: 2 INJECTION, SOLUTION EPIDURAL; INFILTRATION at 12:10

## 2022-10-25 RX ADMIN — PRENATAL VITAMINS-IRON FUMARATE 27 MG IRON-FOLIC ACID 0.8 MG TABLET 1 TABLET: at 08:10

## 2022-10-25 RX ADMIN — IBUPROFEN 600 MG: 600 TABLET, FILM COATED ORAL at 06:10

## 2022-10-25 RX ADMIN — ACETAMINOPHEN 650 MG: 325 TABLET ORAL at 06:10

## 2022-10-25 RX ADMIN — IBUPROFEN 600 MG: 600 TABLET, FILM COATED ORAL at 01:10

## 2022-10-25 RX ADMIN — LIDOCAINE HYDROCHLORIDE,EPINEPHRINE BITARTRATE 3 ML: 15; .005 INJECTION, SOLUTION EPIDURAL; INFILTRATION; INTRACAUDAL; PERINEURAL at 12:10

## 2022-10-25 RX ADMIN — DEXTROSE MONOHYDRATE 3 MILLION UNITS: 50 INJECTION, SOLUTION INTRAVENOUS at 03:10

## 2022-10-25 RX ADMIN — Medication 334 MILLI-UNITS/MIN: at 05:10

## 2022-10-25 RX ADMIN — IBUPROFEN 600 MG: 600 TABLET, FILM COATED ORAL at 08:10

## 2022-10-25 NOTE — SUBJECTIVE & OBJECTIVE
Obstetric HPI:   Patient reports frequent contractions, active fetal movement, No vaginal bleeding , No loss of fluid     This pregnancy has been complicated by IBS, depression, history of FGR, history of leep, anemia, GBS+, THC use    OB History    Para Term  AB Living   4 3 3 0 0 3   SAB IAB Ectopic Multiple Live Births   0 0 0 0 3      # Outcome Date GA Lbr Dennis/2nd Weight Sex Delivery Anes PTL Lv   4 Current            3 Term 21 37w0d  1.88 kg (4 lb 2.3 oz) F Vag-Spont EPI N TOMASA      Name: JAZMINE NI      Apgar1: 8  Apgar5: 9   2 Term 11/15/17 38w0d  3.225 kg (7 lb 1.8 oz) M Vag-Spont EPI N TOMASA      Name: RAINE BOY ZIYAD      Apgar1: 8  Apgar5: 9   1 Term 11/30/15 39w2d / 00:26 3.15 kg (6 lb 15.1 oz) F Vag-Spont EPI N TOMASA      Apgar1: 9  Apgar5: 9     Past Medical History:   Diagnosis Date    Abnormal Pap smear of cervix     MITZI 2    Anemia     Anxiety     Depression     Depression 3/15/2022    Headache(784.0)     Hemorrhoids     Inflammatory bowel disease     Postpartum depression      Past Surgical History:   Procedure Laterality Date    ADENOIDECTOMY      APPENDECTOMY      CERVICAL BIOPSY  W/ LOOP ELECTRODE EXCISION      FOOT SURGERY      TONSILLECTOMY         PTA Medications   Medication Sig    hydrOXYzine pamoate (VISTARIL) 50 MG Cap TAKE 1 CAPSULE BY MOUTH ONCE DAILY AS NEEDED    pantoprazole (PROTONIX) 40 MG tablet Take 40 mg by mouth once daily.    PNV no.95/ferrous fum/folic ac (PRENATAL ORAL) Take by mouth.    sertraline (ZOLOFT) 50 MG tablet Take 50 mg by mouth once daily.       Review of patient's allergies indicates:   Allergen Reactions    Latex, natural rubber Swelling and Rash        Family History       Problem Relation (Age of Onset)    Diabetes Mother, Father    Multiple sclerosis Maternal Aunt    No Known Problems Brother, Sister    Ovarian cancer Mother, Maternal Aunt          Tobacco Use    Smoking status: Never    Smokeless tobacco: Never   Substance and  Sexual Activity    Alcohol use: Not Currently     Alcohol/week: 1.0 standard drink     Types: 1 Glasses of wine per week     Comment: socially    Drug use: Yes     Types: Marijuana     Comment: Occassionally     Sexual activity: Yes     Partners: Male     Review of Systems   Gastrointestinal:  Positive for abdominal pain.   Genitourinary:  Negative for vaginal bleeding and vaginal discharge.   All other systems reviewed and are negative.   Objective:     Vital Signs (Most Recent):    Vital Signs (24h Range):           There is no height or weight on file to calculate BMI.    FHT: 130Cat 1 (reassuring)  TOCO:  Q 3-6 minutes    Physical Exam:   Constitutional: She is oriented to person, place, and time. She appears well-developed and well-nourished.       Cardiovascular:  Normal rate.             Pulmonary/Chest: Effort normal. No respiratory distress.        Abdominal: Soft.     Genitourinary:    Vagina and uterus normal.             Musculoskeletal: Moves all extremeties.       Neurological: She is alert and oriented to person, place, and time.    Skin: Skin is warm and dry.    Psychiatric: She has a normal mood and affect.     Cervix:  Dilation:  3  Effacement:  60  Station: -4  Presentation: Vertex     Significant Labs:  Lab Results   Component Value Date    GROUPTRH A POS 03/15/2022    HEPBSAG Negative 03/15/2022    STREPBCULT (A) 10/06/2022     STREPTOCOCCUS AGALACTIAE (GROUP B)  In case of Penicillin allergy, call lab for further testing.  Beta-hemolytic streptococci are routinely susceptible to   penicillins,cephalosporins and carbapenems.         I have personallly reviewed all pertinent lab results from the last 24 hours.

## 2022-10-25 NOTE — PLAN OF CARE
Plan of care reviewed. Vital signs stable. Tolerating diet. Voiding and ambulating independently. Mom is responding to feeding cues. FOB at bedside. Bonding with baby. Will continue to monitor.

## 2022-10-25 NOTE — HOSPITAL COURSE
R/O labor  VE mid position/soft/3/60/-4, recheck in 2 hours  NST    10/24/22 @2330:  VE 6cm  Admit to LD for labor  GBS prophylaxis  Epidural upon patient request  Anticipate     10/26/22 PPD1  Doing well. Wanting to go home.   Discharge today

## 2022-10-25 NOTE — H&P
[facility] - Labor & Delivery  Obstetrics  History & Physical    Patient Name: Gayla Ni  MRN: [id number]  Admission Date: 10/24/2022  Primary Care Provider: Primary Doctor No    Subjective:     Principal Problem:Threatened labor at term    History of Present Illness:  27 y.o.  38w1d with c/o contractions        Obstetric HPI:   Patient reports frequent contractions, active fetal movement, No vaginal bleeding , No loss of fluid     This pregnancy has been complicated by IBS, depression, history of FGR, history of leep, anemia, GBS+, THC use    OB History    Para Term  AB Living   4 3 3 0 0 3   SAB IAB Ectopic Multiple Live Births   0 0 0 0 3      # Outcome Date GA Lbr Dennis/2nd Weight Sex Delivery Anes PTL Lv   4 Current            3 Term 21 37w0d  1.88 kg (4 lb 2.3 oz) F Vag-Spont EPI N TOMASA      Name: JAZMINE NI      Apgar1: 8  Apgar5: 9   2 Term 11/15/17 38w0d  3.225 kg (7 lb 1.8 oz) M Vag-Spont EPI N TOMASA      Name: CELI NI      Apgar1: 8  Apgar5: 9   1 Term 11/30/15 39w2d / 00:26 3.15 kg (6 lb 15.1 oz) F Vag-Spont EPI N TOMASA      Apgar1: 9  Apgar5: 9     Past Medical History:   Diagnosis Date    Abnormal Pap smear of cervix     MITZI 2    Anemia     Anxiety     Depression     Depression 3/15/2022    Headache(784.0)     Hemorrhoids     Inflammatory bowel disease     Postpartum depression      Past Surgical History:   Procedure Laterality Date    ADENOIDECTOMY      APPENDECTOMY      CERVICAL BIOPSY  W/ LOOP ELECTRODE EXCISION      FOOT SURGERY      TONSILLECTOMY         PTA Medications   Medication Sig    hydrOXYzine pamoate (VISTARIL) 50 MG Cap TAKE 1 CAPSULE BY MOUTH ONCE DAILY AS NEEDED    pantoprazole (PROTONIX) 40 MG tablet Take 40 mg by mouth once daily.    PNV no.95/ferrous fum/folic ac (PRENATAL ORAL) Take by mouth.    sertraline (ZOLOFT) 50 MG tablet Take 50 mg by mouth once daily.       Review of patient's allergies indicates:    Allergen Reactions    Latex, natural rubber Swelling and Rash        Family History       Problem Relation (Age of Onset)    Diabetes Mother, Father    Multiple sclerosis Maternal Aunt    No Known Problems Brother, Sister    Ovarian cancer Mother, Maternal Aunt          Tobacco Use    Smoking status: Never    Smokeless tobacco: Never   Substance and Sexual Activity    Alcohol use: Not Currently     Alcohol/week: 1.0 standard drink     Types: 1 Glasses of wine per week     Comment: socially    Drug use: Yes     Types: Marijuana     Comment: Occassionally     Sexual activity: Yes     Partners: Male     Review of Systems   Gastrointestinal:  Positive for abdominal pain.   Genitourinary:  Negative for vaginal bleeding and vaginal discharge.   All other systems reviewed and are negative.   Objective:     Vital Signs (Most Recent):    Vital Signs (24h Range):           There is no height or weight on file to calculate BMI.    FHT: 130Cat 1 (reassuring)  TOCO:  Q 3-6 minutes    Physical Exam:   Constitutional: She is oriented to person, place, and time. She appears well-developed and well-nourished.       Cardiovascular:  Normal rate.             Pulmonary/Chest: Effort normal. No respiratory distress.        Abdominal: Soft.     Genitourinary:    Vagina and uterus normal.             Musculoskeletal: Moves all extremeties.       Neurological: She is alert and oriented to person, place, and time.    Skin: Skin is warm and dry.    Psychiatric: She has a normal mood and affect.     Cervix:  Dilation:  3  Effacement:  60  Station: -4  Presentation: Vertex     Significant Labs:  Lab Results   Component Value Date    GROUPTRH A POS 03/15/2022    HEPBSAG Negative 03/15/2022    STREPBCULT (A) 10/06/2022     STREPTOCOCCUS AGALACTIAE (GROUP B)  In case of Penicillin allergy, call lab for further testing.  Beta-hemolytic streptococci are routinely susceptible to   penicillins,cephalosporins and carbapenems.         I have  personallly reviewed all pertinent lab results from the last 24 hours.    Assessment/Plan:     27 y.o. female  at 38w1d for:    * Threatened labor at term  R/O labor  VE mid position/soft/3/60/-4  NST        Kathya Stover CNM  Obstetrics  [facility] - Labor & Delivery

## 2022-10-25 NOTE — PLAN OF CARE
[facility] - Labor & Delivery  Discharge Assessment    Primary Care Provider: Primary Doctor No     OB Screen (most recent)       OB Screen - 10/25/22 0818          OB SCREEN    Assessment Type Discharge Planning Assessment     Source of Information patient     Received Prenatal Care Yes     Any indications/suspicions for None     Is this a teen pregnancy No     Is the baby in NICU No     Indication for adoption/Safe Haven No     Indication for DME/post-acute needs No     HIV (+) No     Any congenital  disorders No     Fetal demise/ death No

## 2022-10-25 NOTE — L&D DELIVERY NOTE
[facility] - Labor & Delivery  Vaginal Delivery   Operative Note    SUMMARY     Normal spontaneous vaginal delivery of live male infant, was placed on mothers abdomen for skin to skin and bulb suctioning performed.  Infant delivered position OA over intact perineum.  Nuchal cord: No.    Spontaneous delivery of placenta and IV pitocin given noting good uterine tone.  No lacerations noted.  Patient tolerated delivery well. Sponge needle and lap counted correctly x2.    Indications: Normal labor  Pregnancy complicated by:   Patient Active Problem List   Diagnosis    IBS (irritable bowel syndrome)    History of prior pregnancy with IUGR     Depression during pregnancy in third trimester    H/O LEEP    Marijuana use    LGSIL on Pap smear of cervix    Normal pregnancy in third trimester    Anemia    GBS (group B Streptococcus carrier), +RV culture, currently pregnant    Normal labor     Admitting GA: 38w2d    Delivery Information for Tomi Montana    Birth information:  YOB: 2021   Time of birth: 5:32 AM   Sex: male   Head Delivery Date/Time: 10/25/2022  5:31 AM   Delivery type: Vaginal, Spontaneous   Gestational Age: 38w2d    Delivery Providers    Delivering clinician: Kathya Stover CNM   Provider Role    Stacia Lynch RN Registered Nurse    Beti Traylor RN Registered Nurse    ST Kelli Scrub Person              Measurements    Weight:   Length:          Apgars    Living status: Living  Apgars:  1 min.:  5 min.:  10 min.:  15 min.:  20 min.:    Skin color:  1  1       Heart rate:  2  2       Reflex irritability:  2  2       Muscle tone:  2  2       Respiratory effort:  2  2       Total:  9  9       Apgars assigned by: JAMARCUS TRAYLOR RN         Operative Delivery    Forceps attempted?: No  Vacuum extractor attempted?: No         Shoulder Dystocia    Shoulder dystocia present?: No           Presentation    Presentation: Vertex  Position: Left Occiput Anterior            Interventions/Resuscitation    Method: Bulb Suctioning, Tactile Stimulation       Cord    Vessels: 3 vessels  Complications: None  Delayed Cord Clamping?: Yes  Cord Clamped Date/Time: 10/25/2022  5:36 AM  Cord Blood Disposition: Sent with Baby  Gases Sent?: No  Stem Cell Collection (by MD): No       Placenta    Placenta delivery date/time: 10/25/2022 0549  Placenta removal: Spontaneous  Placenta appearance: Intact  Placenta disposition: discarded           Labor Events:       labor: No     Labor Onset Date/Time:         Dilation Complete Date/Time:         Start Pushing Date/Time:         Start Pushing Date/Time:       Rupture Date/Time:            Rupture type:            Fluid Amount:         Fluid Color:         Fluid Odor:         Membrane Status: INT (Intact)                 steroids: None     Antibiotics given for GBS: Yes     Induction:       Indications for induction:        Augmentation:       Indications for augmentation:       Labor complications: None     Additional complications:          Cervical ripening:                     Delivery:      Episiotomy: None     Indication for Episiotomy:       Perineal Lacerations:   Repaired:      Periurethral Laceration:   Repaired:     Labial Laceration:   Repaired:     Sulcus Laceration:   Repaired:     Vaginal Laceration:   Repaired:     Cervical Laceration:   Repaired:     Repair suture:       Repair # of packets:       Last Value - EBL - Nursing (mL):       Sum - EBL - Nursing (mL): 0     Last Value - EBL - Anesthesia (mL):        Calculated QBL (mL):         Vaginal Sweep Performed:       Surgicount Correct: Yes       Other providers:       Anesthesia    Method: Epidural          Details (if applicable):  Trial of Labor      Categorization:      Priority:     Indications for :     Incision Type:       Additional  information:  Forceps:    Vacuum:    Breech:    Observed anomalies    Other (Comments):

## 2022-10-25 NOTE — LACTATION NOTE
Lactation rounds: Breastfeeding history:  #1 9 months, no complications  #2 6 months, tongue tie with no revision, no complications  #3 NICU infant formula fed  Wishes to exclusively breastfeed this infant.     Mother verbalizes understanding of expected  behaviors and output for the first 48 hours of life.  Discussed the importance of cue based feedings on demand, unrestricted access to the breast, and frequent uninterrupted skin to skin contact.  Risk and implications of artificial nipples and non medically indicated formula supplementation discussed.      Mother states she is obtaining her breast pump through [facility].    Mother denies any further lactation needs or concerns at this time. Encouraged mother to call for assistance when desired or when infant is showing signs of hunger. Mother verbalizes understanding of all education and counseling.

## 2022-10-25 NOTE — PROGRESS NOTES
S: patient resting comfortably in bed with epidural. Family at bedside   O:  VS reviewed, afebrile   Vitals:    10/25/22 0300 10/25/22 0315 10/25/22 0330 10/25/22 0345   BP: 119/72 116/71 (!) 110/57 120/67   Pulse: 96 97 100 93   SpO2: 98% 100% 100% 98%       FHTs 145 moderate varibility, category 1  UC 2-3.5min  SVE 9-10/80/-1    A: IUP @ 38w2d ;     Patient Active Problem List   Diagnosis    IBS (irritable bowel syndrome)    History of prior pregnancy with IUGR     Depression during pregnancy in third trimester    H/O LEEP    Marijuana use    LGSIL on Pap smear of cervix    Normal pregnancy in third trimester    Anemia    GBS (group B Streptococcus carrier), +RV culture, currently pregnant    Normal labor       P:   Continue close monitoring of maternal/fetal status  Continue GBS prophylaxis  Anticipate

## 2022-10-25 NOTE — PROGRESS NOTES
10/25/22 0015   Tele[facility] Luis Angel Note - Strip   Strip Reviewed by Luis Angel Nurse? Yes   [facility] Luis Angel Note - Communication   [place] Nurse Communicated with Bedside Nurse Regarding: Fetal Status   [facility] Luis Angel Note - Notification   Nurse Notified? Yes  (at BS)   Name of Nurse Spoke with Gregorio

## 2022-10-25 NOTE — PROGRESS NOTES
S: patient requesting epidural  O:  VS reviewed, afebrile   Vitals:    10/24/22 2100 10/24/22 2115 10/24/22 2130   BP: 126/85 125/78 127/80   Pulse: 103 96 103   SpO2: 99% 96% 97%       FHTs 125 moderate variability, category 1  UC 3-5min  SVE 6cm per RN    A: IUP @ 38w1d ;     Patient Active Problem List   Diagnosis    IBS (irritable bowel syndrome)    History of prior pregnancy with IUGR     Depression during pregnancy in third trimester    H/O LEEP    Marijuana use    LGSIL on Pap smear of cervix    Normal pregnancy in third trimester    Anemia    GBS (group B Streptococcus carrier), +RV culture, currently pregnant    Normal labor       P:   Continue close monitoring of maternal/fetal status  Admit to LD for labor  GBS prophylaxis  Sitting up for epidural  Anticipate

## 2022-10-25 NOTE — LACTATION NOTE
This note was copied from a baby's chart.  Discussed practices that support optimal maternity care and  feeding such as immediate skin to skin, the magic first hour, the importance of the first feeding, and delaying routine procedures. Also discussed continued skin to skin contact, rooming-in, and feeding on cue. Discussed feeding choice with mother. Reviewed benefits of breastfeeding and risks of formula feeding. Mother states her intention is to breast feed.    Discussed early feeding cues and encouraged mother to feed baby in response to those cues. Encouraged unrestricted feedings rather than timed/amount limits, procedural schedules, or visitation schedules. Reviewed normal feeding expectations of 8 or more feedings per 24 hour period, cues that babies use to signal hunger and satiety, and the importance of physical contact during feeding.

## 2022-10-25 NOTE — ASSESSMENT & PLAN NOTE
R/O labor  VE mid position/soft/3/60/-4  NST    10/24/22 @2330:  VE 6cm  Admit to LD for labor  GBS prophylaxis  Epidural upon patient request  Anticipate

## 2022-10-25 NOTE — ANESTHESIA PREPROCEDURE EVALUATION
10/25/2022  Gayla Montana is a 27 y.o., female.      Pre-op Assessment    I have reviewed the Patient Summary Reports.     I have reviewed the Nursing Notes. I have reviewed the NPO Status.   I have reviewed the Medications.     Review of Systems  Anesthesia Hx:  History of prior surgery of interest to airway management or planning: Denies Family Hx of Anesthesia complications.   Denies Personal Hx of Anesthesia complications.   Social:  Non-Smoker, No Alcohol Use    Hematology/Oncology:     Oncology Normal    -- Anemia:   EENT/Dental:EENT/Dental Normal   Cardiovascular:  Cardiovascular Normal     Pulmonary:  Pulmonary Normal    Renal/:  Renal/ Normal     Hepatic/GI:  Hepatic/GI Normal    Musculoskeletal:  Musculoskeletal Normal    Neurological:   Headaches    Endocrine:  Endocrine Normal    Dermatological:  Skin Normal    Psych:   Psychiatric History anxiety depression          Physical Exam  General: Well nourished, Cooperative, Oriented and Alert    Airway:  Mallampati: II   Mouth Opening: Normal  TM Distance: Normal  Tongue: Normal  Neck ROM: Normal ROM    Dental:  Intact        Anesthesia Plan  Type of Anesthesia, risks & benefits discussed:    Anesthesia Type: Epidural  Informed Consent: Informed consent signed with the Patient and all parties understand the risks and agree with anesthesia plan.  All questions answered. Patient consented to blood products? Yes  ASA Score: 2    Ready For Surgery From Anesthesia Perspective.     .

## 2022-10-25 NOTE — ANESTHESIA PROCEDURE NOTES
Epidural    Patient location during procedure: OB   Reason for block: primary anesthetic   Reason for block: labor analgesia requested by patient and obstetrician  Diagnosis: IUP Labor   Start time: 10/25/2022 12:26 AM  Timeout: 10/25/2022 12:25 AM  End time: 10/25/2022 12:42 AM    Staffing  Performing Provider: Jason Underwood CRNA  Authorizing Provider: Wei Salamanca MD        Preanesthetic Checklist  Completed: patient identified, IV checked, risks and benefits discussed, monitors and equipment checked, pre-op evaluation, timeout performed, anesthesia consent given, hand hygiene performed and patient being monitored  Preparation  Patient position: sitting  Prep: Betadine  Patient monitoring: Pulse Ox and Blood Pressure  Reason for block: primary anesthetic   Epidural  Skin Anesthetic: lidocaine 1%  Skin Wheal: 2 mL  Administration type: continuous  Approach: midline  Interspace: L4-5    Injection technique: RIDDHI air  Needle and Epidural Catheter  Needle type: Tuohy   Needle gauge: 18  Needle length: 3.5 inches  Needle insertion depth: 5 cm  Catheter type: multi-orifice  Catheter size: 20 G  Catheter at skin depth: 12 cm  Insertion Attempts: 1  Test dose: 3 mL of lidocaine 1.5% with Epi 1-to-200,000  Additional Documentation: negative aspiration for heme and CSF, no paresthesia on injection, incremental injection, no significant complaints from patient, no significant pain on injection and no signs/symptoms of IV or SA injection  Needle localization: anatomical landmarks  Assessment  Ease of block: easy  Patient's tolerance of the procedure: comfortable throughout block and no complaints No inadvertent dural puncture with Tuohy.  Dural puncture not performed with spinal needle

## 2022-10-25 NOTE — LACTATION NOTE

## 2022-10-25 NOTE — ANESTHESIA POSTPROCEDURE EVALUATION
Anesthesia Post Evaluation    Patient: Gayla Montana    Procedure(s) Performed: * No procedures listed *    Final Anesthesia Type: epidural      Patient location during evaluation: labor & delivery  Patient participation: Yes- Able to Participate  Level of consciousness: awake and alert and oriented  Post-procedure vital signs: reviewed and stable  Pain management: adequate  Airway patency: patent    PONV status at discharge: No PONV  Anesthetic complications: no      Cardiovascular status: blood pressure returned to baseline, stable and hemodynamically stable  Respiratory status: unassisted, room air and spontaneous ventilation  Hydration status: euvolemic  Follow-up not needed.          Vitals Value Taken Time   /82 10/25/22 0747   Temp 37.1 °C (98.8 °F) 10/25/22 0545   Pulse 71 10/25/22 0756   Resp 20 10/25/22 0545   SpO2 96 % 10/25/22 0756   Vitals shown include unvalidated device data.      No case tracking events are documented in the log.      Pain/Aurelia Score: No data recorded

## 2022-10-26 VITALS
SYSTOLIC BLOOD PRESSURE: 115 MMHG | DIASTOLIC BLOOD PRESSURE: 61 MMHG | OXYGEN SATURATION: 97 % | HEART RATE: 68 BPM | TEMPERATURE: 99 F | RESPIRATION RATE: 18 BRPM

## 2022-10-26 PROBLEM — O99.820 GBS (GROUP B STREPTOCOCCUS CARRIER), +RV CULTURE, CURRENTLY PREGNANT: Status: RESOLVED | Noted: 2022-10-11 | Resolved: 2022-10-26

## 2022-10-26 PROCEDURE — 99238 HOSP IP/OBS DSCHRG MGMT 30/<: CPT | Mod: ,,, | Performed by: MIDWIFE

## 2022-10-26 PROCEDURE — 99238 PR HOSPITAL DISCHARGE DAY,<30 MIN: ICD-10-PCS | Mod: ,,, | Performed by: MIDWIFE

## 2022-10-26 PROCEDURE — 25000003 PHARM REV CODE 250

## 2022-10-26 RX ORDER — IBUPROFEN 600 MG/1
600 TABLET ORAL EVERY 6 HOURS
Qty: 30 TABLET | Refills: 0 | Status: SHIPPED | OUTPATIENT
Start: 2022-10-26 | End: 2024-03-11

## 2022-10-26 RX ADMIN — IBUPROFEN 600 MG: 600 TABLET, FILM COATED ORAL at 11:10

## 2022-10-26 RX ADMIN — IBUPROFEN 600 MG: 600 TABLET, FILM COATED ORAL at 05:10

## 2022-10-26 NOTE — DISCHARGE INSTRUCTIONS
"Mother Self Care:    Activity: Avoid strenuous exercise and get adequate rest.  No driving until the physician consent given.  Emotional Changes: Most women find birth to be a time of great emotional upheaval.  Sense of loss, mood swings, fatigue, anxiety, and feeling "let down" are common.  If feelings worsen or last more than a week, call your physician.  Breast Care/Breastfeeding: Wear a bra for comfort.  Keep nipples dry and apply your own breast milk or lanolin cream as needed for soreness.  Engorgement can be relieved with warm, moist heat before feedings.  You may also take Ibuprofen.  Breast Care/Bottle Feeding: Wear support bra 24 hours a day for one week.  Avoid stimulation to breasts.  You may use ice packs for discomfort.  Walker-Care/Vaginal Bleeding: Remember to use your walker-bottle after urinating.  Your flow will change from red, to pink, to yellow/white color over a period of 2 weeks.  Menstruation will return in 3-8 weeks, or longer if breastfeeding.  Episiotomy Vaginal Delivery: Stitches will dissolve within 10 days to 3 weeks.  Warm baths, tucks, and dermoplast will promote healing.  Avoid bubble baths or strong soaps.  Sexual Activity/Pelvic Rest: No sexual activity, tampons, or douching until your physician gives you consent.  Diet: Continue to eat from the five basic food groups, including plenty of protein, fruits, vegetables, and whole grains.  Limit empty calories and high fat foods.  Drink enough fluids to satisfy thirst and add an extra 500 calories for breastfeeding.  Constipation/Hemorrhoids: Drink plenty of water.  You may take a stool softener or natural laxative (Metamucil). You may use tucks or hemorrhoid ointment and soak in a warm tub.    CALL YOUR OB DOCTOR IF ANY OF THE FOLLOWING OCCURS:  *Heavy bleeding - saturating a pad an hour or passing any large (2-3 inches in size) blood clots.  *Any pain, redness, or tenderness in lower leg.  *You cannot care for yourself or your " baby.  *Any signs of infection-      - Temperature greater than 100.5 degrees F      - Foul smelling vaginal discharge and/or incisional drainage      - Increased episiotomy or incisional pain      - Hot, hard, red or sore area on breast      - Flu-like symptoms      - Any urgency, frequency or burning with urination    Return To the Hospital for further Evaluation:  Headache not relieved by tylenol or ibuprofen  Blurry vision, double vision, seeing spots, or flashing lights  Feeling faint or passing out  Right epigastric pain  Difficulty breathing  Swelling in hands, face, or feet  Any of these symptoms accompanied by nausea/vomiting  Gaining more than 5 pounds in one week  Seizures  These symptoms could be an indication of elevated blood pressure.       If you have any questions that need to be answered immediately please call the Labor & Delivery Unit at [phone] and ask to speak to a nurse.

## 2022-10-26 NOTE — LACTATION NOTE
Lactation rounds: Infant output and weight loss WNL    Mother reports that breastfeeding is going well. She denies nipple pain and reports hearing swallowing at the breast. Mother states that she does not have a breast pump, but is obtaining one through [facility].    Mother anticipates discharge home today. Reviewed signs of good attachment. Reviewed breast massage and compression during feedings and indications for use. Reviewed signs of effective milk transfer and instructed to call pediatrician and lactation if signs not present. Discussed expected feeding and output pattern for days of life 2, 3, 4, & 5+; mother instructed to call pediatrician and lactation if infant is not meeting feeding and output goals.     Reviewed signs of engorgement and expectant management. Reviewed signs of mastitis and instructed mother to call OB provider and lactation if any signs present. Discussed proper use of First Alert Form. Reviewed proper milk handling, collection and storage guidelines. Reviewed nursing diet and nutrition. Discussed resources for medication safety while breastfeeding. Reviewed available outpatient lactation resources.     Infant currently sleeping in crib. Mother states that baby last ate around 0930 and she is waiting on him to wake up. Encouraged mother to wake infant to feed and to not go longer than 4 hours without attempting a feed.    Mother denies any further lactation needs or concerns at this time. Encouraged mother to call for assistance when desired or when infant is showing signs of hunger. Lactation availability discussed. Mother verbalizes understanding of all education and counseling. Primary nurse updated.

## 2022-10-26 NOTE — SUBJECTIVE & OBJECTIVE
Interval History: PPD 1    She is doing well this morning. She is tolerating a regular diet without nausea or vomiting. She is voiding spontaneously. She is ambulating. She has passed flatus, and has a BM. Vaginal bleeding is mild. She denies fever or chills. Abdominal pain is mild and controlled with oral medications. She Is breastfeeding. She desires circumcision for her male baby: yes.    Objective:     Vital Signs (Most Recent):  Temp: 98.8 °F (37.1 °C) (10/26/22 1623)  Pulse: 68 (10/26/22 1623)  Resp: 18 (10/26/22 1623)  BP: 115/61 (10/26/22 1623)  SpO2: 97 % (10/26/22 1623) Vital Signs (24h Range):  Temp:  [98.1 °F (36.7 °C)-98.9 °F (37.2 °C)] 98.8 °F (37.1 °C)  Pulse:  [68-92] 68  Resp:  [18-20] 18  SpO2:  [97 %-98 %] 97 %  BP: (100-126)/(58-80) 115/61        There is no height or weight on file to calculate BMI.    No intake or output data in the 24 hours ending 10/26/22 2567      Significant Labs:  Lab Results   Component Value Date    GROUPTRH A POS 10/24/2022    HEPBSAG Negative 03/15/2022    STREPBCULT (A) 10/06/2022     STREPTOCOCCUS AGALACTIAE (GROUP B)  In case of Penicillin allergy, call lab for further testing.  Beta-hemolytic streptococci are routinely susceptible to   penicillins,cephalosporins and carbapenems.       Recent Labs   Lab 10/24/22  8306   HGB 9.2*   HCT 28.8*       I have personallly reviewed all pertinent lab results from the last 24 hours.  Recent Lab Results       None            Physical Exam:   Constitutional: She is oriented to person, place, and time. She appears well-developed and well-nourished.    HENT:   Head: Normocephalic and atraumatic.    Eyes: Conjunctivae and EOM are normal.     Cardiovascular:  Normal rate, regular rhythm and normal heart sounds.             Pulmonary/Chest: Effort normal and breath sounds normal. No respiratory distress.        Abdominal: Soft. Bowel sounds are normal. She exhibits no distension. There is no abdominal tenderness.     Genitourinary:     Vagina and uterus normal.             Musculoskeletal: Normal range of motion and moves all extremeties.       Neurological: She is alert and oriented to person, place, and time.    Skin: Skin is warm and dry.    Psychiatric: She has a normal mood and affect. Her behavior is normal. Judgment and thought content normal.

## 2022-10-26 NOTE — PROGRESS NOTES
[facility] ([facility])  Obstetrics  Postpartum Progress Note    Patient Name: Gayla Montana  MRN: [id number]  Admission Date: 10/24/2022  Hospital Length of Stay: 1 days  Attending Physician: Evy Briseno MD  Primary Care Provider: Primary Doctor No    Subjective:     Principal Problem: (spontaneous vaginal delivery)    Hospital Course:  R/O labor  VE mid position/soft/3/60/-4, recheck in 2 hours  NST    10/24/22 @2330:  VE 6cm  Admit to LD for labor  GBS prophylaxis  Epidural upon patient request  Anticipate     10/26/22 PPD1  Doing well. Wanting to go home.   Discharge today      Interval History: PPD 1    She is doing well this morning. She is tolerating a regular diet without nausea or vomiting. She is voiding spontaneously. She is ambulating. She has passed flatus, and has a BM. Vaginal bleeding is mild. She denies fever or chills. Abdominal pain is mild and controlled with oral medications. She Is breastfeeding. She desires circumcision for her male baby: yes.    Objective:     Vital Signs (Most Recent):  Temp: 98.8 °F (37.1 °C) (10/26/22 1623)  Pulse: 68 (10/26/22 1623)  Resp: 18 (10/26/22 1623)  BP: 115/61 (10/26/22 1623)  SpO2: 97 % (10/26/22 1623) Vital Signs (24h Range):  Temp:  [98.1 °F (36.7 °C)-98.9 °F (37.2 °C)] 98.8 °F (37.1 °C)  Pulse:  [68-92] 68  Resp:  [18-20] 18  SpO2:  [97 %-98 %] 97 %  BP: (100-126)/(58-80) 115/61        There is no height or weight on file to calculate BMI.    No intake or output data in the 24 hours ending 10/26/22 1749      Significant Labs:  Lab Results   Component Value Date    GROUPTRH A POS 10/24/2022    HEPBSAG Negative 03/15/2022    STREPBCULT (A) 10/06/2022     STREPTOCOCCUS AGALACTIAE (GROUP B)  In case of Penicillin allergy, call lab for further testing.  Beta-hemolytic streptococci are routinely susceptible to   penicillins,cephalosporins and carbapenems.       Recent Labs   Lab 10/24/22  2764   HGB 9.2*   HCT 28.8*       I have  personallly reviewed all pertinent lab results from the last 24 hours.  Recent Lab Results       None            Physical Exam:   Constitutional: She is oriented to person, place, and time. She appears well-developed and well-nourished.    HENT:   Head: Normocephalic and atraumatic.    Eyes: Conjunctivae and EOM are normal.     Cardiovascular:  Normal rate, regular rhythm and normal heart sounds.             Pulmonary/Chest: Effort normal and breath sounds normal. No respiratory distress.        Abdominal: Soft. Bowel sounds are normal. She exhibits no distension. There is no abdominal tenderness.     Genitourinary:    Vagina and uterus normal.             Musculoskeletal: Normal range of motion and moves all extremeties.       Neurological: She is alert and oriented to person, place, and time.    Skin: Skin is warm and dry.    Psychiatric: She has a normal mood and affect. Her behavior is normal. Judgment and thought content normal.     Assessment/Plan:     27 y.o. female  for:    *  (spontaneous vaginal delivery)  Routine PP care    Single liveborn  Cared for by peds    Anemia  H/H on admit 9.2/28.8  Continue PP iron    Depression during pregnancy in third trimester  Doing well emotionally  Continue zoloft PP        Disposition: As patient meets milestones, will plan to discharge today.    ERIC Shankar CNM  Obstetrics  [facility] ([facility])

## 2022-10-26 NOTE — DISCHARGE SUMMARY
[facility] ([facility])  Obstetrics  Discharge Summary      Patient Name: Gayla Montana  MRN: [id number]  Admission Date: 10/24/2022  Hospital Length of Stay: 1 days  Discharge Date and Time:  10/26/2022 5:58 PM  Attending Physician: Evy Briseno MD   Discharging Provider: Gris Lomas CNM   Primary Care Provider: Primary Doctor No    HPI: 27 y.o.  38w1d with c/o contractions            * No surgery found *     Hospital Course:   R/O labor  VE mid position/soft/3/60/-4, recheck in 2 hours  NST    10/24/22 @2330:  VE 6cm  Admit to LD for labor  GBS prophylaxis  Epidural upon patient request  Anticipate     10/26/22 PPD1  Doing well. Wanting to go home.   Discharge today           Final Active Diagnoses:    Diagnosis Date Noted POA    PRINCIPAL PROBLEM:   (spontaneous vaginal delivery) [O80] 10/25/2022 Not Applicable    Single liveborn [Z38.2] 10/25/2022 No    Anemia [D64.9]  Yes    Depression during pregnancy in third trimester [O99.343, F32.A] 03/15/2022 Yes      Problems Resolved During this Admission:    Diagnosis Date Noted Date Resolved POA    Normal labor [O80, Z37.9] 10/24/2022 10/25/2022 Not Applicable    GBS (group B Streptococcus carrier), +RV culture, currently pregnant [O99.820] 10/11/2022 10/26/2022 Not Applicable        Significant Diagnostic Studies: Labs: All labs within the past 24 hours have been reviewed      Feeding Method: breast    Immunizations     Date Immunization Status Dose Route/Site Given by    10/25/22 0710 MMR Incomplete 0.5 mL Subcutaneous/     10/25/22 0710 Tdap Incomplete 0.5 mL Intramuscular/           Delivery:    Episiotomy: None   Lacerations: None   Repair suture: None   Repair # of packets:     Blood loss (ml):       Birth information:  YOB: 2021   Time of birth: 5:32 AM   Sex: male   Delivery type: Vaginal, Spontaneous   Gestational Age: 38w2d    Delivery Clinician:      Other providers:       Additional   information:  Forceps:    Vacuum:    Breech:    Observed anomalies      Living?:           APGARS  One minute Five minutes Ten minutes   Skin color:         Heart rate:         Grimace:         Muscle tone:         Breathing:         Totals: 9  9        Placenta: Delivered:       appearance    Pending Diagnostic Studies:     None          Discharged Condition: good    Disposition: Home or Self Care    Follow Up:   Follow-up Information     [facility], [facility]. Schedule an appointment as soon as possible for a visit in 4 week(s).                     Patient Instructions:      Activity as tolerated     Medications:  Current Discharge Medication List      START taking these medications    Details   ibuprofen (ADVIL,MOTRIN) 600 MG tablet Take 1 tablet (600 mg total) by mouth every 6 (six) hours.  Qty: 30 tablet, Refills: 0         CONTINUE these medications which have NOT CHANGED    Details   PNV no.95/ferrous fum/folic ac (PRENATAL ORAL) Take by mouth.      sertraline (ZOLOFT) 50 MG tablet Take 50 mg by mouth once daily.         STOP taking these medications       hydrOXYzine pamoate (VISTARIL) 50 MG Cap Comments:   Reason for Stopping:         pantoprazole (PROTONIX) 40 MG tablet Comments:   Reason for Stopping:             ERIC Shankar CNM  Obstetrics  [facility] ([facility])

## 2022-10-26 NOTE — LACTATION NOTE
Lactation Rounds:     Mother states that breastfeeding is going well. Mother reports hearing swallows and denies pain. Reviewed expected  behaviors and output for the first 48 hours of life. Discussed the importance of cue based feedings on demand, unrestricted access to the breast, and frequent uninterrupted skin to skin contact.     Mother denies any lactation needs or concerns at this time. Encouraged mother to call for assistance when desired or when infant is showing signs of hunger. Mother verbalizes understanding of all education and counseling.

## 2022-11-28 ENCOUNTER — POSTPARTUM VISIT (OUTPATIENT)
Dept: OBSTETRICS AND GYNECOLOGY | Facility: CLINIC | Age: 27
End: 2022-11-28
Payer: MEDICAID

## 2022-11-28 VITALS
DIASTOLIC BLOOD PRESSURE: 72 MMHG | WEIGHT: 164.69 LBS | BODY MASS INDEX: 30.31 KG/M2 | HEIGHT: 62 IN | SYSTOLIC BLOOD PRESSURE: 136 MMHG

## 2022-11-28 DIAGNOSIS — R87.612 LGSIL ON PAP SMEAR OF CERVIX: Primary | ICD-10-CM

## 2022-11-28 PROCEDURE — 99999 PR PBB SHADOW E&M-EST. PATIENT-LVL III: CPT | Mod: PBBFAC,,, | Performed by: ADVANCED PRACTICE MIDWIFE

## 2022-11-28 PROCEDURE — 88175 CYTOPATH C/V AUTO FLUID REDO: CPT | Performed by: ADVANCED PRACTICE MIDWIFE

## 2022-11-28 PROCEDURE — 59430 PR CARE AFTER DELIVERY ONLY: ICD-10-PCS | Mod: ,,, | Performed by: ADVANCED PRACTICE MIDWIFE

## 2022-11-28 PROCEDURE — 99999 PR PBB SHADOW E&M-EST. PATIENT-LVL III: ICD-10-PCS | Mod: PBBFAC,,, | Performed by: ADVANCED PRACTICE MIDWIFE

## 2022-11-28 PROCEDURE — 99213 OFFICE O/P EST LOW 20 MIN: CPT | Mod: PBBFAC,PO | Performed by: ADVANCED PRACTICE MIDWIFE

## 2022-11-28 RX ORDER — ACETAMINOPHEN AND CODEINE PHOSPHATE 120; 12 MG/5ML; MG/5ML
1 SOLUTION ORAL DAILY
Qty: 28 TABLET | Refills: 11 | Status: SHIPPED | OUTPATIENT
Start: 2022-11-28 | End: 2023-10-05 | Stop reason: ALTCHOICE

## 2022-11-28 NOTE — PROGRESS NOTES
"CC: Post-partum follow-up    Gayla Montana is a 27 y.o. female  who presents for post-partum visit.  She is S/P a .  She and the baby are doing well.  No pain.  No fever.   No bowel / bladder complaints.    Delivery Date: 10/25/22  Delivery MD: Kathya GALLEGO  Gender: male  Breast Feeding: YES  Depression: NO scored 4 on [place] scale   Contraception: oral progesterone-only contraceptive    Pregnancy was complicated by:  LGSIL to be followed up postpartum    /72   Ht 5' 2" (1.575 m)   Wt 74.7 kg (164 lb 10.9 oz)   LMP 2022 (Approximate)   Breastfeeding Yes   BMI 30.12 kg/m²     ROS:  GENERAL: No fever, chills, fatigability.  VULVAR: No pain, no lesions and no itching.  VAGINAL: No relaxation, no itching, no discharge, no abnormal bleeding and no lesions.  ABDOMEN: No abdominal pain. Denies nausea. Denies vomiting. No diarrhea. No constipation  BREAST: Denies pain. No lumps.  URINARY: No incontinence, no nocturia, no frequency and no dysuria.  CARDIOVASCULAR: No chest pain. No shortness of breath. No leg cramps.  NEUROLOGICAL: No headaches. No vision changes.    PHYSICAL EXAM:  Exam chaperoned by nurse  ABDOMEN:  Soft, non-tender, non-distended  VULVA:  Normal, no lesions  CERVIX:  Without lesions, polyps or tenderness.  UTERUS:  Normal size, shape, consistency, no mass or tenderness.  ADNEXA:  Normal in size without mass or tenderness  Breast-no mastitis, lesions, lymphadenopathy.  Lactating well  IMP:  Doing well S/P   Breast-feeding  After discussion decides on Micronor which is prescribed.    LGSIL  Instructions / precautions reviewed  Contraceptive counseling    Rx Micronor to be taken as directed with 11 refills and backup method 1st month.    Continue prenatal vitamins for at least a year    PLAN:  Pap today.    May resume normal activities  Return: 1yr or prn     "

## 2022-12-02 LAB
CLINICAL INFO: NORMAL
CYTO CVX: NORMAL
CYTOLOGIST CVX/VAG CYTO: NORMAL
CYTOLOGIST CVX/VAG CYTO: NORMAL
CYTOLOGY CMNT CVX/VAG CYTO-IMP: NORMAL
CYTOLOGY PAP THIN PREP EXPLANATION: NORMAL
DATE OF PREVIOUS PAP: NORMAL
DATE PREVIOUS BX: YES
GEN CATEG CVX/VAG CYTO-IMP: NORMAL
LMP START DATE: NORMAL
MICROORGANISM CVX/VAG CYTO: NORMAL
PATHOLOGIST CVX/VAG CYTO: NORMAL
SERVICE CMNT-IMP: NORMAL
SPECIMEN SOURCE CVX/VAG CYTO: NORMAL
STAT OF ADQ CVX/VAG CYTO-IMP: NORMAL

## 2023-04-22 ENCOUNTER — PATIENT MESSAGE (OUTPATIENT)
Dept: OBSTETRICS AND GYNECOLOGY | Facility: CLINIC | Age: 28
End: 2023-04-22
Payer: MEDICAID

## 2023-07-22 ENCOUNTER — PATIENT MESSAGE (OUTPATIENT)
Dept: OBSTETRICS AND GYNECOLOGY | Facility: CLINIC | Age: 28
End: 2023-07-22
Payer: MEDICAID

## 2023-07-24 ENCOUNTER — PATIENT MESSAGE (OUTPATIENT)
Dept: OBSTETRICS AND GYNECOLOGY | Facility: CLINIC | Age: 28
End: 2023-07-24
Payer: MEDICAID

## 2023-07-25 ENCOUNTER — TELEPHONE (OUTPATIENT)
Dept: OBSTETRICS AND GYNECOLOGY | Facility: CLINIC | Age: 28
End: 2023-07-25
Payer: MEDICAID

## 2023-07-25 RX ORDER — SERTRALINE HYDROCHLORIDE 50 MG/1
50 TABLET, FILM COATED ORAL DAILY
Qty: 30 TABLET | Refills: 2 | Status: SHIPPED | OUTPATIENT
Start: 2023-07-25 | End: 2023-12-07

## 2023-07-25 NOTE — TELEPHONE ENCOUNTER
Called and advised to do pregnancy test if negative continue close  observation, this could be an isolated episode, if not would advise gyn follow-up appointment.  If positive, needs to be seen.    She also stated that she needed a refill on her Zoloft, advised that this care does need to be taken over by PCP but I did send a prescription that will lost her 3 months to give her the opportunity to schedule the appointment

## 2023-10-05 ENCOUNTER — PATIENT MESSAGE (OUTPATIENT)
Dept: OBSTETRICS AND GYNECOLOGY | Facility: CLINIC | Age: 28
End: 2023-10-05
Payer: MEDICAID

## 2023-10-05 RX ORDER — NORETHINDRONE ACETATE AND ETHINYL ESTRADIOL 1MG-20(21)
1 KIT ORAL DAILY
Qty: 30 TABLET | Refills: 11 | Status: SHIPPED | OUTPATIENT
Start: 2023-10-05 | End: 2024-10-04

## 2023-10-31 ENCOUNTER — TELEPHONE (OUTPATIENT)
Dept: OBSTETRICS AND GYNECOLOGY | Facility: CLINIC | Age: 28
End: 2023-10-31
Payer: MEDICAID

## 2023-10-31 NOTE — TELEPHONE ENCOUNTER
----- Message from Jeanne Rey sent at 10/31/2023  8:44 AM CDT -----  Contact: [phone] Patient  No blue slot available to schedule an appointment for the patient.  Patient is established with which : Marian Lange CNM [Marcy Leon]  Reason for the visit: per pt-I started taking birth control a month ago after having a miscarriage.I've been in a lot of pain, lost 20 lbs and have been bleeding a lot.   Would the patient like a call back, or a response through their [facility] portal?:  pt portal

## 2023-10-31 NOTE — TELEPHONE ENCOUNTER
Rtn pt call in regards to bleeding, Pt barley talking , washing dishes,  I could not understand exactly her complaint. Advised pt if she is bleeding heavily she needs to go to ER to be assessed. Pt verbalized understanding       Ashley MORE LPN  OB/GYN

## 2024-01-03 RX ORDER — SERTRALINE HYDROCHLORIDE 50 MG/1
50 TABLET, FILM COATED ORAL
Qty: 30 TABLET | Refills: 0 | Status: SHIPPED | OUTPATIENT
Start: 2024-01-03 | End: 2024-01-20 | Stop reason: SDUPTHER

## 2024-01-20 ENCOUNTER — PATIENT MESSAGE (OUTPATIENT)
Dept: URGENT CARE | Facility: CLINIC | Age: 29
End: 2024-01-20
Payer: MEDICAID

## 2024-01-22 RX ORDER — SERTRALINE HYDROCHLORIDE 50 MG/1
50 TABLET, FILM COATED ORAL DAILY
Qty: 30 TABLET | Refills: 11 | Status: SHIPPED | OUTPATIENT
Start: 2024-01-22

## 2024-03-11 ENCOUNTER — TELEPHONE (OUTPATIENT)
Dept: OBSTETRICS AND GYNECOLOGY | Facility: CLINIC | Age: 29
End: 2024-03-11
Payer: MEDICAID

## 2024-03-11 ENCOUNTER — HOSPITAL ENCOUNTER (EMERGENCY)
Facility: HOSPITAL | Age: 29
Discharge: HOME | End: 2024-03-11
Attending: EMERGENCY MEDICINE
Payer: MEDICAID

## 2024-03-11 VITALS
HEIGHT: 61 IN | OXYGEN SATURATION: 98 % | HEART RATE: 79 BPM | BODY MASS INDEX: 26.22 KG/M2 | DIASTOLIC BLOOD PRESSURE: 60 MMHG | RESPIRATION RATE: 20 BRPM | SYSTOLIC BLOOD PRESSURE: 117 MMHG | WEIGHT: 138.88 LBS | TEMPERATURE: 99 F

## 2024-03-11 DIAGNOSIS — R10.9 ABDOMINAL PAIN, UNSPECIFIED ABDOMINAL LOCATION: Primary | ICD-10-CM

## 2024-03-11 DIAGNOSIS — N93.9 VAGINAL BLEEDING: ICD-10-CM

## 2024-03-11 LAB
ALBUMIN SERPL BCP-MCNC: 4 G/DL (ref 3.5–5.2)
ALP SERPL-CCNC: 37 U/L (ref 55–135)
ALT SERPL W/O P-5'-P-CCNC: 11 U/L (ref 10–44)
ANION GAP SERPL CALC-SCNC: 8 MMOL/L (ref 8–16)
AST SERPL-CCNC: 16 U/L (ref 10–40)
BASOPHILS # BLD AUTO: 0.01 K/UL (ref 0–0.2)
BASOPHILS NFR BLD: 0.2 % (ref 0–1.9)
BILIRUB SERPL-MCNC: 0.6 MG/DL (ref 0.1–1)
BILIRUB UR QL STRIP: ABNORMAL
BUN SERPL-MCNC: 10 MG/DL (ref 6–20)
CALCIUM SERPL-MCNC: 8.8 MG/DL (ref 8.7–10.5)
CHLORIDE SERPL-SCNC: 109 MMOL/L (ref 95–110)
CLARITY UR REFRACT.AUTO: CLEAR
CO2 SERPL-SCNC: 20 MMOL/L (ref 23–29)
COLOR UR AUTO: YELLOW
CREAT SERPL-MCNC: 0.7 MG/DL (ref 0.5–1.4)
DIFFERENTIAL METHOD BLD: ABNORMAL
EOSINOPHIL # BLD AUTO: 0 K/UL (ref 0–0.5)
EOSINOPHIL NFR BLD: 0.3 % (ref 0–8)
ERYTHROCYTE [DISTWIDTH] IN BLOOD BY AUTOMATED COUNT: 12.9 % (ref 11.5–14.5)
EST. GFR  (NO RACE VARIABLE): >60 ML/MIN/1.73 M^2
GLUCOSE SERPL-MCNC: 97 MG/DL (ref 70–110)
GLUCOSE UR QL STRIP: NEGATIVE
HCG INTACT+B SERPL-ACNC: <1.2 MIU/ML
HCT VFR BLD AUTO: 38 % (ref 37–48.5)
HCV AB SERPL QL IA: NEGATIVE
HEP C VIRUS HOLD SPECIMEN: NORMAL
HGB BLD-MCNC: 13 G/DL (ref 12–16)
HGB UR QL STRIP: ABNORMAL
HIV 1+2 AB+HIV1 P24 AG SERPL QL IA: NEGATIVE
IMM GRANULOCYTES # BLD AUTO: 0.02 K/UL (ref 0–0.04)
IMM GRANULOCYTES NFR BLD AUTO: 0.3 % (ref 0–0.5)
KETONES UR QL STRIP: ABNORMAL
LEUKOCYTE ESTERASE UR QL STRIP: NEGATIVE
LIPASE SERPL-CCNC: 9 U/L (ref 4–60)
LYMPHOCYTES # BLD AUTO: 1.1 K/UL (ref 1–4.8)
LYMPHOCYTES NFR BLD: 16.3 % (ref 18–48)
MCH RBC QN AUTO: 30.7 PG (ref 27–31)
MCHC RBC AUTO-ENTMCNC: 34.2 G/DL (ref 32–36)
MCV RBC AUTO: 90 FL (ref 82–98)
MICROSCOPIC COMMENT: NORMAL
MONOCYTES # BLD AUTO: 0.5 K/UL (ref 0.3–1)
MONOCYTES NFR BLD: 7.9 % (ref 4–15)
NEUTROPHILS # BLD AUTO: 4.8 K/UL (ref 1.8–7.7)
NEUTROPHILS NFR BLD: 75 % (ref 38–73)
NITRITE UR QL STRIP: NEGATIVE
NRBC BLD-RTO: 0 /100 WBC
PH UR STRIP: 6 [PH] (ref 5–8)
PLATELET # BLD AUTO: 168 K/UL (ref 150–450)
PMV BLD AUTO: 11.6 FL (ref 9.2–12.9)
POTASSIUM SERPL-SCNC: 3.8 MMOL/L (ref 3.5–5.1)
PROT SERPL-MCNC: 6.9 G/DL (ref 6–8.4)
PROT UR QL STRIP: ABNORMAL
RBC # BLD AUTO: 4.24 M/UL (ref 4–5.4)
RBC #/AREA URNS AUTO: 1 /HPF (ref 0–4)
SODIUM SERPL-SCNC: 137 MMOL/L (ref 136–145)
SP GR UR STRIP: >=1.03 (ref 1–1.03)
URN SPEC COLLECT METH UR: ABNORMAL
UROBILINOGEN UR STRIP-ACNC: <2 EU/DL
WBC # BLD AUTO: 6.43 K/UL (ref 3.9–12.7)

## 2024-03-11 PROCEDURE — 25000003 PHARM REV CODE 250: Mod: ER | Performed by: NURSE PRACTITIONER

## 2024-03-11 PROCEDURE — 83690 ASSAY OF LIPASE: CPT | Mod: ER | Performed by: EMERGENCY MEDICINE

## 2024-03-11 PROCEDURE — 81000 URINALYSIS NONAUTO W/SCOPE: CPT | Mod: ER | Performed by: NURSE PRACTITIONER

## 2024-03-11 PROCEDURE — 96361 HYDRATE IV INFUSION ADD-ON: CPT | Mod: ER

## 2024-03-11 PROCEDURE — 96375 TX/PRO/DX INJ NEW DRUG ADDON: CPT | Mod: ER

## 2024-03-11 PROCEDURE — 99285 EMERGENCY DEPT VISIT HI MDM: CPT | Mod: 25,ER

## 2024-03-11 PROCEDURE — 87389 HIV-1 AG W/HIV-1&-2 AB AG IA: CPT | Performed by: EMERGENCY MEDICINE

## 2024-03-11 PROCEDURE — 84702 CHORIONIC GONADOTROPIN TEST: CPT | Mod: ER | Performed by: EMERGENCY MEDICINE

## 2024-03-11 PROCEDURE — 96374 THER/PROPH/DIAG INJ IV PUSH: CPT | Mod: ER

## 2024-03-11 PROCEDURE — 85025 COMPLETE CBC W/AUTO DIFF WBC: CPT | Mod: ER | Performed by: EMERGENCY MEDICINE

## 2024-03-11 PROCEDURE — 86803 HEPATITIS C AB TEST: CPT | Performed by: EMERGENCY MEDICINE

## 2024-03-11 PROCEDURE — 80053 COMPREHEN METABOLIC PANEL: CPT | Mod: ER | Performed by: EMERGENCY MEDICINE

## 2024-03-11 PROCEDURE — 63600175 PHARM REV CODE 636 W HCPCS: Mod: ER | Performed by: NURSE PRACTITIONER

## 2024-03-11 RX ORDER — KETOROLAC TROMETHAMINE 30 MG/ML
30 INJECTION, SOLUTION INTRAMUSCULAR; INTRAVENOUS
Status: COMPLETED | OUTPATIENT
Start: 2024-03-11 | End: 2024-03-11

## 2024-03-11 RX ORDER — ONDANSETRON 4 MG/1
4 TABLET, FILM COATED ORAL EVERY 6 HOURS
Qty: 12 TABLET | Refills: 0 | Status: SHIPPED | OUTPATIENT
Start: 2024-03-11 | End: 2024-03-14

## 2024-03-11 RX ORDER — SODIUM CHLORIDE 9 MG/ML
1000 INJECTION, SOLUTION INTRAVENOUS
Status: COMPLETED | OUTPATIENT
Start: 2024-03-11 | End: 2024-03-11

## 2024-03-11 RX ORDER — NAPROXEN 500 MG/1
500 TABLET ORAL 2 TIMES DAILY WITH MEALS
Qty: 10 TABLET | Refills: 0 | Status: SHIPPED | OUTPATIENT
Start: 2024-03-11 | End: 2024-03-16

## 2024-03-11 RX ORDER — ONDANSETRON HYDROCHLORIDE 2 MG/ML
4 INJECTION, SOLUTION INTRAVENOUS
Status: COMPLETED | OUTPATIENT
Start: 2024-03-11 | End: 2024-03-11

## 2024-03-11 RX ADMIN — ONDANSETRON 4 MG: 2 INJECTION INTRAMUSCULAR; INTRAVENOUS at 02:03

## 2024-03-11 RX ADMIN — KETOROLAC TROMETHAMINE 30 MG: 30 INJECTION, SOLUTION INTRAMUSCULAR; INTRAVENOUS at 02:03

## 2024-03-11 RX ADMIN — SODIUM CHLORIDE 1000 ML: 9 INJECTION, SOLUTION INTRAVENOUS at 12:03

## 2024-03-11 NOTE — TELEPHONE ENCOUNTER
----- Message from Kathrin Culebra sent at 3/11/2024 10:22 AM CDT -----  Contact: Gayla  Type:  Sooner Apoointment Request    Caller is requesting a sooner appointment.  Caller declined first available appointment listed below.  Caller will not accept being placed on the waitlist and is requesting a message be sent to doctor.  Name of Caller:Pt  When is the first available appointment?unk  Symptoms:pregnancy symptoms, and bleeding, nausea  Would the patient rather a call back or a response via [facility]? Call back  Best Call Back Number:[phone].  Additional Information: Pt is very concerned about symptoms plus bleeding.     Thanks  TS

## 2024-03-11 NOTE — ED PROVIDER NOTES
"Encounter Date: 3/11/2024       History     Chief Complaint   Patient presents with    Abdominal Pain     LLQ abd pain, dizzy, nausea, vomiting, heavy bleeding, onset 2 weeks ago, pt is on birth control     Patient presents to ER for left lower quadrant abdominal pain, onset yesterday.  Associated symptoms include intermittent vaginal bleeding that has been ongoing over last several months.  She states when vaginal bleeding occurs, she goes through 2 feminine pads per day.  She reports irregular menstrual cycles.  States she is on oral birth control medication, which she takes daily.  She also reports nausea, vomiting, dizziness, fatigue.  States that she believes that she is pregnant.  Has had negative home pregnancy tests recently.  Last bowel movement yesterday, states "normal".  She denies fever, chills, generalized body aches, diarrhea, constipation, flank pain, hematuria, dysuria.    The history is provided by the patient.     Review of patient's allergies indicates:   Allergen Reactions    Latex, natural rubber Swelling and Rash     Past Medical History:   Diagnosis Date    Abnormal Pap smear of cervix     MITZI 2    Anemia     Anxiety     Depression     Depression 3/15/2022    GBS (group B Streptococcus carrier), +RV culture, currently pregnant 10/11/2022    RX in labor    Headache(784.0)     Hemorrhoids     Inflammatory bowel disease     Postpartum depression      Past Surgical History:   Procedure Laterality Date    ADENOIDECTOMY      APPENDECTOMY      CERVICAL BIOPSY  W/ LOOP ELECTRODE EXCISION      FOOT SURGERY      TONSILLECTOMY       Family History   Problem Relation Age of Onset    Diabetes Mother     Ovarian cancer Mother     Diabetes Father     No Known Problems Brother     No Known Problems Sister     Multiple sclerosis Maternal Aunt     Ovarian cancer Maternal Aunt     Breast cancer Neg Hx     Colon cancer Neg Hx     Thrombosis Neg Hx      Social History     Tobacco Use "    Smoking status: Never    Smokeless tobacco: Never   Substance Use Topics    Alcohol use: Not Currently     Alcohol/week: 1.0 standard drink of alcohol     Types: 1 Glasses of wine per week     Comment: socially    Drug use: Not Currently     Types: Marijuana     Comment: Occassionally      Review of Systems   Constitutional:  Positive for fatigue. Negative for chills and fever.   HENT:  Negative for ear pain, rhinorrhea, sinus pain and sore throat.    Eyes:  Negative for pain.   Respiratory:  Negative for cough and shortness of breath.    Cardiovascular:  Negative for chest pain.   Gastrointestinal:  Positive for abdominal pain, nausea and vomiting. Negative for constipation and diarrhea.   Genitourinary:  Positive for vaginal bleeding. Negative for dysuria, flank pain and hematuria.   Musculoskeletal:  Negative for back pain, myalgias and neck pain.   Skin:  Negative for rash.   Neurological:  Negative for weakness and headaches.   All other systems reviewed and are negative.      Physical Exam     Initial Vitals [03/11/24 1128]   BP Pulse Resp Temp SpO2   (!) 149/84 100 16 98.5 °F (36.9 °C) 98 %      MAP       --         Physical Exam    Nursing note and vitals reviewed.  Constitutional: She appears well-developed and well-nourished. She is not diaphoretic. She is cooperative.  Non-toxic appearance. She does not have a sickly appearance. No distress.   HENT:   Head: Normocephalic and atraumatic.   Neck: Neck supple.   Normal range of motion.  Cardiovascular:  Normal rate, regular rhythm and intact distal pulses.           Pulmonary/Chest: Breath sounds normal. No respiratory distress.   Abdominal: Abdomen is soft. Bowel sounds are normal. She exhibits no distension. There is abdominal tenderness (+left lower quadrant abdominal tenderness upon palpation.).   No CVA tenderness. There is no rebound and no guarding.   Genitourinary: Cervix exhibits no motion tenderness. Right adnexum displays no tenderness.  Left adnexum displays no tenderness.    Vaginal bleeding (Moderate amount of blood noted in the vaginal canal.) present.      No vaginal discharge, erythema or tenderness.   There is bleeding (Moderate amount of blood noted in the vaginal canal.) in the vagina. No erythema or tenderness in the vagina.    No foreign body in the vagina.      Genitourinary Comments: Female chaperone, RN, present during pelvic examination.     Musculoskeletal:         General: Normal range of motion.      Cervical back: Normal range of motion and neck supple.     Neurological: She is alert and oriented to person, place, and time. She has normal strength. GCS score is 15. GCS eye subscore is 4. GCS verbal subscore is 5. GCS motor subscore is 6.   Skin: Skin is warm and dry. Capillary refill takes less than 2 seconds.       ED Course   Procedures  Labs Reviewed   CBC W/ AUTO DIFFERENTIAL - Abnormal; Notable for the following components:       Result Value    Gran % 75.0 (*)     Lymph % 16.3 (*)     All other components within normal limits    Narrative:     Release to patient->Immediate   COMPREHENSIVE METABOLIC PANEL - Abnormal; Notable for the following components:    CO2 20 (*)     Alkaline Phosphatase 37 (*)     All other components within normal limits    Narrative:     Release to patient->Immediate   URINALYSIS, REFLEX TO URINE CULTURE - Abnormal; Notable for the following components:    Specific Gravity, UA >=1.030 (*)     Protein, UA Trace (*)     Ketones, UA 3+ (*)     Bilirubin (UA) 2+ (*)     Occult Blood UA 3+ (*)     All other components within normal limits    Narrative:     Specimen Source->Urine   HEP C VIRUS HOLD SPECIMEN    Narrative:     Release to patient->Immediate   HCG, QUANTITATIVE    Narrative:     Release to patient->Immediate   LIPASE   LIPASE    Narrative:     Release to patient->Immediate   URINALYSIS MICROSCOPIC    Narrative:     Specimen Source->Urine   HIV 1 / 2 ANTIBODY   HEPATITIS C ANTIBODY         Results for orders placed or performed during the hospital encounter of 03/11/24   HCV Virus Hold Specimen   Result Value Ref Range    HEP C Virus Hold Specimen Hold for HCV sendout    CBC W/ AUTO DIFFERENTIAL   Result Value Ref Range    WBC 6.43 3.90 - 12.70 K/uL    RBC 4.24 4.00 - 5.40 M/uL    Hemoglobin 13.0 12.0 - 16.0 g/dL    Hematocrit 38.0 37.0 - 48.5 %    MCV 90 82 - 98 fL    MCH 30.7 27.0 - 31.0 pg    MCHC 34.2 32.0 - 36.0 g/dL    RDW 12.9 11.5 - 14.5 %    Platelets 168 150 - 450 K/uL    MPV 11.6 9.2 - 12.9 fL    Immature Granulocytes 0.3 0.0 - 0.5 %    Gran # (ANC) 4.8 1.8 - 7.7 K/uL    Immature Grans (Abs) 0.02 0.00 - 0.04 K/uL    Lymph # 1.1 1.0 - 4.8 K/uL    Mono # 0.5 0.3 - 1.0 K/uL    Eos # 0.0 0.0 - 0.5 K/uL    Baso # 0.01 0.00 - 0.20 K/uL    nRBC 0 0 /100 WBC    Gran % 75.0 (H) 38.0 - 73.0 %    Lymph % 16.3 (L) 18.0 - 48.0 %    Mono % 7.9 4.0 - 15.0 %    Eosinophil % 0.3 0.0 - 8.0 %    Basophil % 0.2 0.0 - 1.9 %    Differential Method Automated    Comp. Metabolic Panel   Result Value Ref Range    Sodium 137 136 - 145 mmol/L    Potassium 3.8 3.5 - 5.1 mmol/L    Chloride 109 95 - 110 mmol/L    CO2 20 (L) 23 - 29 mmol/L    Glucose 97 70 - 110 mg/dL    BUN 10 6 - 20 mg/dL    Creatinine 0.7 0.5 - 1.4 mg/dL    Calcium 8.8 8.7 - 10.5 mg/dL    Total Protein 6.9 6.0 - 8.4 g/dL    Albumin 4.0 3.5 - 5.2 g/dL    Total Bilirubin 0.6 0.1 - 1.0 mg/dL    Alkaline Phosphatase 37 (L) 55 - 135 U/L    AST 16 10 - 40 U/L    ALT 11 10 - 44 U/L    eGFR >60.0 >60 mL/min/1.73 m^2    Anion Gap 8 8 - 16 mmol/L   HCG, Quantitative   Result Value Ref Range    HCG Quant <1.2 See Text mIU/mL   Urinalysis, Reflex to Urine Culture Urine, Clean Catch    Specimen: Urine   Result Value Ref Range    Specimen UA Urine, Clean Catch     Color, UA Yellow Yellow, Straw, Dina    Appearance, UA Clear Clear    pH, UA 6.0 5.0 - 8.0    Specific Gravity, UA >=1.030 (A) 1.005 - 1.030    Protein, UA Trace (A) Negative    Glucose, UA Negative  Negative    Ketones, UA 3+ (A) Negative    Bilirubin (UA) 2+ (A) Negative    Occult Blood UA 3+ (A) Negative    Nitrite, UA Negative Negative    Urobilinogen, UA <2.0 <2.0 EU/dL    Leukocytes, UA Negative Negative   Lipase   Result Value Ref Range    Lipase 9 4 - 60 U/L   Urinalysis Microscopic   Result Value Ref Range    RBC, UA 1 0 - 4 /hpf    Microscopic Comment SEE COMMENT          Imaging Results              US Pelvis Comp with Transvag NON-OB (xpd) (Final result)  Result time 03/11/24 14:26:48   Procedure changed from US Pelvis Complete Non OB     Final result by Dev Marcus MD (03/11/24 14:26:48)                   Impression:      No sonographic abnormality.      Electronically signed by: Dev Marcus MD  Date:    03/11/2024  Time:    14:26               Narrative:    EXAMINATION:  US PELVIS COMP WITH TRANSVAG NON-OB (XPD)    CLINICAL HISTORY:  lower abdominal pain; vaginal bleeding.;    TECHNIQUE:  Transabdominal sonography of the pelvis was performed, followed by transvaginal sonography to better evaluate the uterus and ovaries.    COMPARISON:  None.    FINDINGS:  Uterus:    Size: 8.1 x 4.2 x 5.4 cm    Masses: None    Endometrium: Normal in this pre menopausal patient, measuring 5  mm.  A few punctate echogenic foci along the margin endometrial canal may be calcifications can be seen with previous instrumentation or inflammatory process.    Right ovary:    Size: 3.2 x 2.0 x 1.8 cm    Appearance: Normal    Vascular flow: Normal.    Left ovary:    Size: 3.0 x 2.3 x 2.8 cm    Appearance: Normal    Vascular Flow: Normal.    Free Fluid:    None.                                       Medications   0.9%  NaCl infusion (0 mLs Intravenous Stopped 3/11/24 1328)   ondansetron injection 4 mg (4 mg Intravenous Given 3/11/24 1454)   ketorolac injection 30 mg (30 mg Intravenous Given 3/11/24 1456)     Medical Decision Making  Amount and/or Complexity of Data Reviewed  Radiology: ordered.    Risk  Prescription drug  management.                   All results reviewed and discussed with patient she verbalized understanding.  Patient states she is established with OBGYN services, instructed patient follow-up with her OBGYN.  Discussed signs and symptoms to return to ER.  Patient is nontoxic/non ill-appearing.  Vital signs stable.  I discussed with patient  that evaluation in the ED does not suggest any emergent or life threatening medical conditions requiring immediate intervention beyond what was provided in the ED, and I believe patient is safe for discharge. Regardless, an unremarkable evaluation in the ED does not preclude the development or presence of a serious of life threatening condition. As such, patient was instructed to return immediately for any worsening or change in current symptoms.                     Clinical Impression:  Final diagnoses:  [R10.9] Abdominal pain, unspecified abdominal location (Primary)  [N93.9] Vaginal bleeding          ED Disposition Condition    Discharge Stable          ED Prescriptions       Medication Sig Dispense Start Date End Date Auth. Provider    naproxen (NAPROSYN) 500 MG tablet Take 1 tablet (500 mg total) by mouth 2 (two) times daily with meals. for 5 days 10 tablet 3/11/2024 3/16/2024 Leroy Torrez NP    ondansetron (ZOFRAN) 4 MG tablet Take 1 tablet (4 mg total) by mouth every 6 (six) hours. for 3 days 12 tablet 3/11/2024 3/14/2024 Leroy Torrez NP          Follow-up Information       Follow up With Specialties Details Why Contact Info    Marian Lange CNM Obstetrics and Gynecology In 1 day  [address]  [place] [address] [phone]      [facility] Dept Emergency Medicine  As needed, If symptoms worsen [address] [address] [address] [phone]             Leroy Torrez NP  03/11/24 2000

## 2024-03-11 NOTE — TELEPHONE ENCOUNTER
Pt called in regards to Symptoms of Pregnancy and Bleeding  Pt states that home test were negative, But she thinks she may be Pregnant.   Pt states she is bleeding as well, Advised pt to report to ER to be assessed. Pt Verbalized understanding.       Ashley MORE LPN  OB/GYN

## 2024-04-21 ENCOUNTER — HOSPITAL ENCOUNTER (EMERGENCY)
Facility: HOSPITAL | Age: 29
Discharge: HOME | End: 2024-04-21
Attending: EMERGENCY MEDICINE
Payer: MEDICAID

## 2024-04-21 VITALS
TEMPERATURE: 98 F | HEART RATE: 68 BPM | OXYGEN SATURATION: 100 % | WEIGHT: 132.69 LBS | DIASTOLIC BLOOD PRESSURE: 100 MMHG | BODY MASS INDEX: 25.08 KG/M2 | SYSTOLIC BLOOD PRESSURE: 165 MMHG | RESPIRATION RATE: 18 BRPM

## 2024-04-21 DIAGNOSIS — N39.0 URINARY TRACT INFECTION WITHOUT HEMATURIA, SITE UNSPECIFIED: Primary | ICD-10-CM

## 2024-04-21 DIAGNOSIS — R07.9 CHEST PAIN: ICD-10-CM

## 2024-04-21 LAB
ALBUMIN SERPL BCP-MCNC: 3.9 G/DL (ref 3.5–5.2)
ALP SERPL-CCNC: 36 U/L (ref 55–135)
ALT SERPL W/O P-5'-P-CCNC: 11 U/L (ref 10–44)
ANION GAP SERPL CALC-SCNC: 10 MMOL/L (ref 8–16)
APTT PPP: 28 SEC (ref 21–32)
AST SERPL-CCNC: 13 U/L (ref 10–40)
B-HCG UR QL: NEGATIVE
BACTERIA #/AREA URNS AUTO: ABNORMAL /HPF
BASOPHILS # BLD AUTO: 0.03 K/UL (ref 0–0.2)
BASOPHILS NFR BLD: 0.4 % (ref 0–1.9)
BILIRUB SERPL-MCNC: 0.5 MG/DL (ref 0.1–1)
BILIRUB UR QL STRIP: NEGATIVE
BNP SERPL-MCNC: 16 PG/ML (ref 0–99)
BUN SERPL-MCNC: 12 MG/DL (ref 6–20)
CALCIUM SERPL-MCNC: 9.4 MG/DL (ref 8.7–10.5)
CHLORIDE SERPL-SCNC: 109 MMOL/L (ref 95–110)
CLARITY UR REFRACT.AUTO: ABNORMAL
CO2 SERPL-SCNC: 22 MMOL/L (ref 23–29)
COLOR UR AUTO: YELLOW
CREAT SERPL-MCNC: 0.7 MG/DL (ref 0.5–1.4)
CTP QC/QA: YES
CTP QC/QA: YES
D DIMER PPP IA.FEU-MCNC: 0.42 MG/L FEU
DIFFERENTIAL METHOD BLD: NORMAL
EOSINOPHIL # BLD AUTO: 0.1 K/UL (ref 0–0.5)
EOSINOPHIL NFR BLD: 1.1 % (ref 0–8)
ERYTHROCYTE [DISTWIDTH] IN BLOOD BY AUTOMATED COUNT: 13 % (ref 11.5–14.5)
EST. GFR  (NO RACE VARIABLE): >60 ML/MIN/1.73 M^2
GLUCOSE SERPL-MCNC: 87 MG/DL (ref 70–110)
GLUCOSE UR QL STRIP: NEGATIVE
HCT VFR BLD AUTO: 38.7 % (ref 37–48.5)
HGB BLD-MCNC: 13 G/DL (ref 12–16)
HGB UR QL STRIP: ABNORMAL
IMM GRANULOCYTES # BLD AUTO: 0.03 K/UL (ref 0–0.04)
IMM GRANULOCYTES NFR BLD AUTO: 0.4 % (ref 0–0.5)
INR PPP: 1 (ref 0.8–1.2)
KETONES UR QL STRIP: NEGATIVE
LEUKOCYTE ESTERASE UR QL STRIP: ABNORMAL
LYMPHOCYTES # BLD AUTO: 2.3 K/UL (ref 1–4.8)
LYMPHOCYTES NFR BLD: 27.4 % (ref 18–48)
MCH RBC QN AUTO: 30.8 PG (ref 27–31)
MCHC RBC AUTO-ENTMCNC: 33.6 G/DL (ref 32–36)
MCV RBC AUTO: 92 FL (ref 82–98)
MICROSCOPIC COMMENT: ABNORMAL
MONOCYTES # BLD AUTO: 0.4 K/UL (ref 0.3–1)
MONOCYTES NFR BLD: 5.2 % (ref 4–15)
NEUTROPHILS # BLD AUTO: 5.6 K/UL (ref 1.8–7.7)
NEUTROPHILS NFR BLD: 65.5 % (ref 38–73)
NITRITE UR QL STRIP: NEGATIVE
NRBC BLD-RTO: 0 /100 WBC
PH UR STRIP: 6 [PH] (ref 5–8)
PLATELET # BLD AUTO: 218 K/UL (ref 150–450)
PMV BLD AUTO: 11.6 FL (ref 9.2–12.9)
POC MOLECULAR INFLUENZA A AGN: NEGATIVE
POC MOLECULAR INFLUENZA B AGN: NEGATIVE
POTASSIUM SERPL-SCNC: 3.7 MMOL/L (ref 3.5–5.1)
PROT SERPL-MCNC: 6.9 G/DL (ref 6–8.4)
PROT UR QL STRIP: NEGATIVE
PROTHROMBIN TIME: 10.9 SEC (ref 9–12.5)
RBC # BLD AUTO: 4.22 M/UL (ref 4–5.4)
RBC #/AREA URNS AUTO: 1 /HPF (ref 0–4)
SARS-COV-2 RDRP RESP QL NAA+PROBE: NEGATIVE
SODIUM SERPL-SCNC: 141 MMOL/L (ref 136–145)
SP GR UR STRIP: 1.02 (ref 1–1.03)
SQUAMOUS #/AREA URNS AUTO: 2 /HPF
TROPONIN I SERPL DL<=0.01 NG/ML-MCNC: <0.006 NG/ML (ref 0–0.03)
URN SPEC COLLECT METH UR: ABNORMAL
UROBILINOGEN UR STRIP-ACNC: <2 EU/DL
WBC # BLD AUTO: 8.51 K/UL (ref 3.9–12.7)
WBC #/AREA URNS AUTO: 30 /HPF (ref 0–5)

## 2024-04-21 PROCEDURE — 81025 URINE PREGNANCY TEST: CPT | Mod: ER | Performed by: EMERGENCY MEDICINE

## 2024-04-21 PROCEDURE — 99285 EMERGENCY DEPT VISIT HI MDM: CPT | Mod: 25,ER

## 2024-04-21 PROCEDURE — 85025 COMPLETE CBC W/AUTO DIFF WBC: CPT | Mod: ER | Performed by: EMERGENCY MEDICINE

## 2024-04-21 PROCEDURE — 83880 ASSAY OF NATRIURETIC PEPTIDE: CPT | Mod: ER | Performed by: EMERGENCY MEDICINE

## 2024-04-21 PROCEDURE — 85379 FIBRIN DEGRADATION QUANT: CPT | Mod: ER | Performed by: EMERGENCY MEDICINE

## 2024-04-21 PROCEDURE — 87502 INFLUENZA DNA AMP PROBE: CPT | Mod: ER

## 2024-04-21 PROCEDURE — 96374 THER/PROPH/DIAG INJ IV PUSH: CPT | Mod: ER

## 2024-04-21 PROCEDURE — 85730 THROMBOPLASTIN TIME PARTIAL: CPT | Mod: ER | Performed by: EMERGENCY MEDICINE

## 2024-04-21 PROCEDURE — 87635 SARS-COV-2 COVID-19 AMP PRB: CPT | Mod: ER | Performed by: EMERGENCY MEDICINE

## 2024-04-21 PROCEDURE — 85610 PROTHROMBIN TIME: CPT | Mod: ER | Performed by: EMERGENCY MEDICINE

## 2024-04-21 PROCEDURE — 63600175 PHARM REV CODE 636 W HCPCS: Mod: ER | Performed by: EMERGENCY MEDICINE

## 2024-04-21 PROCEDURE — 81000 URINALYSIS NONAUTO W/SCOPE: CPT | Mod: ER | Performed by: EMERGENCY MEDICINE

## 2024-04-21 PROCEDURE — 80053 COMPREHEN METABOLIC PANEL: CPT | Mod: ER | Performed by: EMERGENCY MEDICINE

## 2024-04-21 PROCEDURE — 87086 URINE CULTURE/COLONY COUNT: CPT | Performed by: EMERGENCY MEDICINE

## 2024-04-21 PROCEDURE — 84484 ASSAY OF TROPONIN QUANT: CPT | Mod: ER | Performed by: EMERGENCY MEDICINE

## 2024-04-21 PROCEDURE — 93010 ELECTROCARDIOGRAM REPORT: CPT | Mod: ,,, | Performed by: INTERNAL MEDICINE

## 2024-04-21 PROCEDURE — 93005 ELECTROCARDIOGRAM TRACING: CPT | Mod: ER

## 2024-04-21 RX ORDER — KETOROLAC TROMETHAMINE 30 MG/ML
15 INJECTION, SOLUTION INTRAMUSCULAR; INTRAVENOUS
Status: COMPLETED | OUTPATIENT
Start: 2024-04-21 | End: 2024-04-21

## 2024-04-21 RX ORDER — NITROFURANTOIN 25; 75 MG/1; MG/1
100 CAPSULE ORAL 2 TIMES DAILY
Qty: 10 CAPSULE | Refills: 0 | Status: SHIPPED | OUTPATIENT
Start: 2024-04-21 | End: 2024-04-26

## 2024-04-21 RX ADMIN — KETOROLAC TROMETHAMINE 15 MG: 30 INJECTION, SOLUTION INTRAMUSCULAR at 10:04

## 2024-04-22 LAB
OHS QRS DURATION: 78 MS
OHS QTC CALCULATION: 403 MS

## 2024-04-22 NOTE — ED PROVIDER NOTES
Encounter Date: 4/21/2024       History     Chief Complaint   Patient presents with    multiple issues     Pt wants to be evaluated for pneumonia, had flu several weeks ago. Gets sharp pain in lateral chest wall at times. Denies cough x 2 weeks, fever, but has been sneezing which makes pain worse. Vaginal bleeding x 6 months ( 3 pads per day), left pelvic/ ovary pain. Urinary frequency and sediments in urine x 3 weeks. Bruising easily x 1 month. 30lb weight loss since January without any changes to diet.      30 y/o F with PMH of IBS here with multiple complaints.     -Left sided chest pain that worsening with motion over the past 3 weeks. Initially had flu like symptoms with cough, but this improved. She only has chest pain and SOB now. Denies any sputum, hemoptysis.     -Dysuria over the past 3 weeks with increased frequency, despite Azo pills. She denies any current abdominal pain, but says he was evaluated for pelvic pain last month with normal US.     -Vaginal bleeding for 6 months since starting birth control.     -Easy bruising for 6 months, awakening with multiple bruises without significant trauma.     -Weight loss, about 30 pounds over the past 6 months, unintentional.     She has been unable to obtain PCP and OB/GYN evaluation since onset of these issues, next available apt's are in June and July.         The history is provided by the patient.     Review of patient's allergies indicates:   Allergen Reactions    Latex, natural rubber Swelling and Rash     Past Medical History:   Diagnosis Date    Abnormal Pap smear of cervix     MITZI 2    Anemia     Anxiety     Depression     Depression 3/15/2022    GBS (group B Streptococcus carrier), +RV culture, currently pregnant 10/11/2022    RX in labor    Headache(784.0)     Hemorrhoids     Inflammatory bowel disease     Postpartum depression      Past Surgical History:   Procedure Laterality Date    ADENOIDECTOMY      APPENDECTOMY      CERVICAL BIOPSY  W/ LOOP  ELECTRODE EXCISION      FOOT SURGERY      TONSILLECTOMY       Family History   Problem Relation Name Age of Onset    Diabetes Mother      Ovarian cancer Mother      Diabetes Father      No Known Problems Brother      No Known Problems Sister      Multiple sclerosis Maternal Aunt      Ovarian cancer Maternal Aunt      Breast cancer Neg Hx      Colon cancer Neg Hx      Thrombosis Neg Hx       Social History     Tobacco Use    Smoking status: Never    Smokeless tobacco: Never   Substance Use Topics    Alcohol use: Not Currently     Alcohol/week: 1.0 standard drink of alcohol     Types: 1 Glasses of wine per week     Comment: socially    Drug use: Not Currently     Types: Marijuana     Comment: Occassionally      Review of Systems   Constitutional:  Positive for unexpected weight change. Negative for diaphoresis and fever.   HENT:  Negative for congestion, dental problem and sore throat.    Eyes:  Negative for pain and visual disturbance.   Respiratory:  Positive for shortness of breath. Negative for cough.    Cardiovascular:  Positive for chest pain. Negative for palpitations.   Gastrointestinal:  Negative for abdominal pain, diarrhea, nausea and vomiting.   Genitourinary:  Positive for dysuria and vaginal bleeding. Negative for flank pain and pelvic pain.   Musculoskeletal:  Negative for back pain and neck pain.   Skin:  Negative for rash and wound.   Neurological:  Negative for weakness, numbness and headaches.   Hematological:  Bruises/bleeds easily.   Psychiatric/Behavioral:  Negative for agitation and confusion.        Physical Exam     Initial Vitals [04/21/24 2009]   BP Pulse Resp Temp SpO2   (!) 165/82 85 18 98.1 °F (36.7 °C) 100 %      MAP       --         Physical Exam    Constitutional: She appears well-developed and well-nourished.   HENT:   Head: Normocephalic and atraumatic.   Mouth/Throat: Oropharynx is clear and moist.   Eyes: EOM are normal. Pupils are equal, round, and reactive to light.   Neck: Neck  supple.   Normal range of motion.  Cardiovascular:  Normal rate and regular rhythm.           Pulmonary/Chest: Breath sounds normal. No respiratory distress. She has no wheezes. She has no rales.   Abdominal: Abdomen is soft. She exhibits no distension and no mass. There is no abdominal tenderness. There is no rebound and no guarding.   Musculoskeletal:      Cervical back: Normal range of motion and neck supple.     Neurological: She is alert and oriented to person, place, and time. She has normal strength. No sensory deficit.   Skin: Skin is warm and dry.   Several small old bruises on the proximal thighs and arms.    Psychiatric: She has a normal mood and affect.         ED Course   Procedures  Labs Reviewed   COMPREHENSIVE METABOLIC PANEL - Abnormal; Notable for the following components:       Result Value    CO2 22 (*)     Alkaline Phosphatase 36 (*)     All other components within normal limits   URINALYSIS, REFLEX TO URINE CULTURE - Abnormal; Notable for the following components:    Appearance, UA Hazy (*)     Occult Blood UA 2+ (*)     Leukocytes, UA Trace (*)     All other components within normal limits    Narrative:     Specimen Source->Urine   URINALYSIS MICROSCOPIC - Abnormal; Notable for the following components:    WBC, UA 30 (*)     All other components within normal limits    Narrative:     Specimen Source->Urine   CULTURE, URINE   CBC W/ AUTO DIFFERENTIAL   PREGNANCY TEST, URINE RAPID    Narrative:     Specimen Source->Urine   D DIMER, QUANTITATIVE   TROPONIN I   APTT   PROTIME-INR   B-TYPE NATRIURETIC PEPTIDE   SARS-COV-2 RDRP GENE   POCT INFLUENZA A/B MOLECULAR     EKG Readings: (Independently Interpreted)   Rate of 62 beats per minute.  Normal sinus rhythm.  Normal axis.  P.r., QRS and QTC intervals within normal limits.  No STEMI.  Left atrial enlargement.       Imaging Results              X-Ray Chest AP Portable (Final result)  Result time 04/21/24 22:21:45      Final result by Meli Martin  MD AYLEEN (04/21/24 22:21:45)                   Impression:      No acute abnormality.      Electronically signed by: Meli Martin  Date:    04/21/2024  Time:    22:21               Narrative:    EXAMINATION:  XR CHEST AP PORTABLE    CLINICAL HISTORY:  Chest pain;.    TECHNIQUE:  Single frontal portable view of the chest was performed.    COMPARISON:  None    FINDINGS:  Support devices: None    The lungs are clear, with normal appearance of pulmonary vasculature and no pleural effusion or pneumothorax.                                       Medications   ketorolac injection 15 mg (15 mg Intravenous Given 4/21/24 4702)     Medical Decision Making  DDx includes pleurisy, PNA, Flu, UTI, DUB, Coagulopathy, Anemia, PE    Amount and/or Complexity of Data Reviewed  External Data Reviewed: radiology.     Details: Reviewed prior US from last month, normal.   Labs: ordered. Decision-making details documented in ED Course.  Radiology: ordered. Decision-making details documented in ED Course.  ECG/medicine tests: ordered and independent interpretation performed. Decision-making details documented in ED Course.    Risk  Prescription drug management.               ED Course as of 04/22/24 0103   Sun Apr 21, 2024 2235 10:39 PM Reassessment: I reassessed the pt.  The pt is resting comfortably and is NAD.  Pt states their sx have improved. Discussed test results, shared treatment plan, specific conditions for return, and the need for f/u.  Answered their questions at this time.  Pt understands and agrees to the plan.  The pt has remained hemodynamically stable through ED course and is stable for discharge.    [BA]      ED Course User Index  [BA] Akash Bhatt MD                           Clinical Impression:  Final diagnoses:  [R07.9] Chest pain  [N39.0] Urinary tract infection without hematuria, site unspecified (Primary)          ED Disposition Condition    Discharge Stable          ED Prescriptions       Medication Sig  Dispense Start Date End Date Auth. Provider    nitrofurantoin, macrocrystal-monohydrate, (MACROBID) 100 MG capsule Take 1 capsule (100 mg total) by mouth 2 (two) times daily. for 5 days 10 capsule 4/21/2024 4/26/2024 Akash Bhatt MD          Follow-up Information       Follow up With Specialties Details Why Contact Info    Your OB/GYN  Schedule an appointment as soon as possible for a visit in 2 days For re-evaluation and further treatment     [facility] - Emergency Dept Emergency Medicine Go today If symptoms worsen, For re-evaluation and further treatment [id number]  [facility] [id number] [phone]    Your Primary Care Provider  Schedule an appointment as soon as possible for a visit in 2 days For re-evaluation and further treatment              Akash Bhatt MD  04/22/24 0101

## 2024-04-22 NOTE — ED NOTES
"Pt reports having "profuse" vaginal bleeding for the last 6 months; saturating 3 large pads per day. Pt reports HX of Cervical Cancer back in 2018 with surgical removal. Pt believes that the bleeding is either caused by the birth control pill or the return of her cervical cancer.   "

## 2024-04-23 LAB
BACTERIA UR CULT: NORMAL
BACTERIA UR CULT: NORMAL

## 2024-11-06 ENCOUNTER — TELEPHONE (OUTPATIENT)
Dept: OBSTETRICS AND GYNECOLOGY | Facility: CLINIC | Age: 29
End: 2024-11-06
Payer: MEDICAID

## 2024-11-06 NOTE — TELEPHONE ENCOUNTER
----- Message from Jahaira sent at 11/6/2024  9:55 AM CST -----  Patient  calling thought her appointment was tomorrow, but want to know if she can still come. Please callback [phone] to assist

## 2024-11-11 ENCOUNTER — TELEPHONE (OUTPATIENT)
Dept: OBSTETRICS AND GYNECOLOGY | Facility: CLINIC | Age: 29
End: 2024-11-11
Payer: MEDICAID

## 2024-11-11 NOTE — TELEPHONE ENCOUNTER
----- Message from Belkis sent at 11/11/2024 12:01 PM CST -----  Type:  Sooner Appointment Request    Caller is requesting a sooner appointment.  Caller declined first available appointment listed below.  Caller will not accept being placed on the waitlist and is requesting a message be sent to doctor.  Name of Caller:pt  When is the first available appointment?n/a  Symptoms:Pregnant  Would the patient rather a call back or a response via [facility]? call  Best Call Back Number: [phone]  Additional Information:

## 2024-11-12 ENCOUNTER — HOSPITAL ENCOUNTER (EMERGENCY)
Facility: HOSPITAL | Age: 29
Discharge: PSYCHIATRIC OR SUBSTANCE USE FACILITY | End: 2024-11-12
Attending: EMERGENCY MEDICINE
Payer: MEDICAID

## 2024-11-12 VITALS
OXYGEN SATURATION: 97 % | TEMPERATURE: 99 F | WEIGHT: 113.75 LBS | HEIGHT: 61 IN | RESPIRATION RATE: 18 BRPM | HEART RATE: 87 BPM | DIASTOLIC BLOOD PRESSURE: 70 MMHG | SYSTOLIC BLOOD PRESSURE: 116 MMHG | BODY MASS INDEX: 21.48 KG/M2

## 2024-11-12 DIAGNOSIS — Z3A.12 12 WEEKS GESTATION OF PREGNANCY: ICD-10-CM

## 2024-11-12 DIAGNOSIS — R11.2 NAUSEA & VOMITING: ICD-10-CM

## 2024-11-12 DIAGNOSIS — F32.A DEPRESSION, UNSPECIFIED DEPRESSION TYPE: ICD-10-CM

## 2024-11-12 DIAGNOSIS — R45.851 SUICIDAL IDEATION: Primary | ICD-10-CM

## 2024-11-12 LAB
ALBUMIN SERPL BCP-MCNC: 4.4 G/DL (ref 3.5–5.2)
ALP SERPL-CCNC: 32 U/L (ref 40–150)
ALT SERPL W/O P-5'-P-CCNC: 10 U/L (ref 10–44)
AMPHET+METHAMPHET UR QL: NEGATIVE
ANION GAP SERPL CALC-SCNC: 13 MMOL/L (ref 8–16)
APAP SERPL-MCNC: <3 UG/ML (ref 10–20)
AST SERPL-CCNC: 16 U/L (ref 10–40)
BARBITURATES UR QL SCN>200 NG/ML: NEGATIVE
BASOPHILS # BLD AUTO: 0.01 K/UL (ref 0–0.2)
BASOPHILS NFR BLD: 0.1 % (ref 0–1.9)
BENZODIAZ UR QL SCN>200 NG/ML: NEGATIVE
BILIRUB SERPL-MCNC: 0.7 MG/DL (ref 0.1–1)
BILIRUB UR QL STRIP: NEGATIVE
BUN SERPL-MCNC: 8 MG/DL (ref 6–20)
BZE UR QL SCN: NEGATIVE
CALCIUM SERPL-MCNC: 9.4 MG/DL (ref 8.7–10.5)
CANNABINOIDS UR QL SCN: ABNORMAL
CHLORIDE SERPL-SCNC: 102 MMOL/L (ref 95–110)
CLARITY UR REFRACT.AUTO: CLEAR
CO2 SERPL-SCNC: 22 MMOL/L (ref 23–29)
COLOR UR AUTO: YELLOW
CREAT SERPL-MCNC: 0.6 MG/DL (ref 0.5–1.4)
CREAT UR-MCNC: 216.3 MG/DL (ref 15–325)
CTP QC/QA: YES
DIFFERENTIAL METHOD BLD: ABNORMAL
EOSINOPHIL # BLD AUTO: 0 K/UL (ref 0–0.5)
EOSINOPHIL NFR BLD: 0.2 % (ref 0–8)
ERYTHROCYTE [DISTWIDTH] IN BLOOD BY AUTOMATED COUNT: 12.9 % (ref 11.5–14.5)
EST. GFR  (NO RACE VARIABLE): >60 ML/MIN/1.73 M^2
ETHANOL SERPL-MCNC: <10 MG/DL
GLUCOSE SERPL-MCNC: 85 MG/DL (ref 70–110)
GLUCOSE UR QL STRIP: NEGATIVE
HCT VFR BLD AUTO: 38.6 % (ref 37–48.5)
HGB BLD-MCNC: 13.4 G/DL (ref 12–16)
HGB UR QL STRIP: NEGATIVE
IMM GRANULOCYTES # BLD AUTO: 0.03 K/UL (ref 0–0.04)
IMM GRANULOCYTES NFR BLD AUTO: 0.3 % (ref 0–0.5)
KETONES UR QL STRIP: ABNORMAL
LEUKOCYTE ESTERASE UR QL STRIP: NEGATIVE
LYMPHOCYTES # BLD AUTO: 1.2 K/UL (ref 1–4.8)
LYMPHOCYTES NFR BLD: 13.3 % (ref 18–48)
MCH RBC QN AUTO: 31.2 PG (ref 27–31)
MCHC RBC AUTO-ENTMCNC: 34.7 G/DL (ref 32–36)
MCV RBC AUTO: 90 FL (ref 82–98)
METHADONE UR QL SCN>300 NG/ML: NEGATIVE
MONOCYTES # BLD AUTO: 0.4 K/UL (ref 0.3–1)
MONOCYTES NFR BLD: 4.7 % (ref 4–15)
NEUTROPHILS # BLD AUTO: 7.4 K/UL (ref 1.8–7.7)
NEUTROPHILS NFR BLD: 81.4 % (ref 38–73)
NITRITE UR QL STRIP: NEGATIVE
NRBC BLD-RTO: 0 /100 WBC
OHS QRS DURATION: 82 MS
OHS QTC CALCULATION: 426 MS
OPIATES UR QL SCN: NEGATIVE
PCP UR QL SCN>25 NG/ML: NEGATIVE
PH UR STRIP: 6 [PH] (ref 5–8)
PLATELET # BLD AUTO: 218 K/UL (ref 150–450)
PMV BLD AUTO: 10.6 FL (ref 9.2–12.9)
POTASSIUM SERPL-SCNC: 3.6 MMOL/L (ref 3.5–5.1)
PROT SERPL-MCNC: 8 G/DL (ref 6–8.4)
PROT UR QL STRIP: NEGATIVE
RBC # BLD AUTO: 4.29 M/UL (ref 4–5.4)
SALICYLATES SERPL-MCNC: <5 MG/DL (ref 15–30)
SARS-COV-2 RDRP RESP QL NAA+PROBE: NEGATIVE
SODIUM SERPL-SCNC: 137 MMOL/L (ref 136–145)
SP GR UR STRIP: >=1.03 (ref 1–1.03)
TOXICOLOGY INFORMATION: ABNORMAL
TSH SERPL DL<=0.005 MIU/L-ACNC: 1.12 UIU/ML (ref 0.4–4)
URN SPEC COLLECT METH UR: ABNORMAL
UROBILINOGEN UR STRIP-ACNC: NEGATIVE EU/DL
WBC # BLD AUTO: 9.14 K/UL (ref 3.9–12.7)

## 2024-11-12 PROCEDURE — 94761 N-INVAS EAR/PLS OXIMETRY MLT: CPT | Mod: ER

## 2024-11-12 PROCEDURE — 99285 EMERGENCY DEPT VISIT HI MDM: CPT | Mod: 25,ER

## 2024-11-12 PROCEDURE — 87635 SARS-COV-2 COVID-19 AMP PRB: CPT | Mod: ER | Performed by: EMERGENCY MEDICINE

## 2024-11-12 PROCEDURE — 81003 URINALYSIS AUTO W/O SCOPE: CPT | Mod: 59,ER | Performed by: EMERGENCY MEDICINE

## 2024-11-12 PROCEDURE — 80307 DRUG TEST PRSMV CHEM ANLYZR: CPT | Mod: ER | Performed by: EMERGENCY MEDICINE

## 2024-11-12 PROCEDURE — 93005 ELECTROCARDIOGRAM TRACING: CPT | Mod: ER

## 2024-11-12 PROCEDURE — 85025 COMPLETE CBC W/AUTO DIFF WBC: CPT | Mod: ER | Performed by: EMERGENCY MEDICINE

## 2024-11-12 PROCEDURE — 80179 DRUG ASSAY SALICYLATE: CPT | Mod: ER | Performed by: EMERGENCY MEDICINE

## 2024-11-12 PROCEDURE — 80143 DRUG ASSAY ACETAMINOPHEN: CPT | Mod: ER | Performed by: EMERGENCY MEDICINE

## 2024-11-12 PROCEDURE — 82077 ASSAY SPEC XCP UR&BREATH IA: CPT | Mod: ER | Performed by: EMERGENCY MEDICINE

## 2024-11-12 PROCEDURE — 99215 OFFICE O/P EST HI 40 MIN: CPT | Mod: 95,AF,HB, | Performed by: PSYCHIATRY & NEUROLOGY

## 2024-11-12 PROCEDURE — 84443 ASSAY THYROID STIM HORMONE: CPT | Mod: ER | Performed by: EMERGENCY MEDICINE

## 2024-11-12 PROCEDURE — 93010 ELECTROCARDIOGRAM REPORT: CPT | Mod: ,,, | Performed by: INTERNAL MEDICINE

## 2024-11-12 PROCEDURE — 80053 COMPREHEN METABOLIC PANEL: CPT | Mod: ER | Performed by: EMERGENCY MEDICINE

## 2024-11-12 PROCEDURE — 25000003 PHARM REV CODE 250: Mod: ER | Performed by: EMERGENCY MEDICINE

## 2024-11-12 RX ORDER — ONDANSETRON 4 MG/1
4 TABLET, ORALLY DISINTEGRATING ORAL EVERY 6 HOURS PRN
Status: DISCONTINUED | OUTPATIENT
Start: 2024-11-12 | End: 2024-11-12 | Stop reason: HOSPADM

## 2024-11-12 RX ORDER — ONDANSETRON 4 MG/1
4 TABLET, ORALLY DISINTEGRATING ORAL
Status: COMPLETED | OUTPATIENT
Start: 2024-11-12 | End: 2024-11-12

## 2024-11-12 RX ORDER — BUSPIRONE HYDROCHLORIDE 10 MG/1
10 TABLET ORAL DAILY
Status: ON HOLD | COMMUNITY

## 2024-11-12 RX ADMIN — ONDANSETRON 4 MG: 4 TABLET, ORALLY DISINTEGRATING ORAL at 03:11

## 2024-11-12 NOTE — CONSULTS
[facility]  Psychiatry  Telepsychiatry Consult Note    Please see previous notes:    Patient agreeable to consultation via telepsychiatry.    Tele-Consultation from Psychiatry started: 11/12/2024 at 1315  The chief complaint leading to psychiatric consultation is: SI  This consultation was requested by the Emergency Department attending physician.  The location of the consulting psychiatrist is  [facility] .  The patient location is  [facility] EMERGENCY DEPARTMENT   The patient arrived at the ED at: 1010    Also present with the patient at the time of the consultation: rn    Patient Identification:   Gayla Montana is a 29 y.o. female.    Patient information was obtained from patient.  Patient presented voluntarily to the Emergency Department by private vehicle.    Consults  Teleconsult Time Documentation  Subjective:     History of Present Illness:  No notes on file     Psychiatric History:   Previous Psychiatric Hospitalizations: Yes   Previous Medication Trials: Yes   Previous Suicide Attempts: no   History of Violence: no  History of Depression: yes  History of Lisa: no  History of Auditory/Visual Hallucination no  History of Delusions: no  Outpatient psychiatrist (current & past): Yes    Substance Abuse History:  Tobacco:No  Alcohol: No  Illicit Substances:No  Detox/Rehab: No    Legal History: Past charges/incarcerations: No     Family Psychiatric History: denies      Social History:  Developmental/Childhood:Achieved all developmental milestones timely  *Education:High School Diploma  Employment Status/Finances:Employed   Relationship Status/Sexual Orientation: Single:  Relationship cutoff  Children: 4  Housing Status: Home    history:  NO  Access to gun: NO  Restorationism:Spiritual without formal affiliation  Recreational activities:Time with family    Psychiatric Mental Status Exam:  Arousal: alert  Sensorium/Orientation: oriented to grossly intact  Behavior/Cooperation: normal, cooperative  "  Speech: normal tone, normal rate, normal pitch, normal volume  Language: grossly intact  Mood: " down "   Affect: flat  Thought Process: illogical  Thought Content:   Auditory hallucinations: NO  Visual hallucinations: NO  Paranoia: NO  Delusions:  NO  Suicidal ideation: YES:      Homicidal ideation: NO  Attention/Concentration:  intact  Memory:    Recent:  Intact   Remote: Intact   3/3 immediate, 3/3 at 5 min  Fund of Knowledge: Aware of current events   Abstract reasoning: proverbs were abstract  Insight: intact  Judgment: behavior is adequate to circumstances      Past Medical History:   Past Medical History:   Diagnosis Date    Abnormal Pap smear of cervix     MITZI 2    Anemia     Anxiety     Depression     Depression 3/15/2022    GBS (group B Streptococcus carrier), +RV culture, currently pregnant 10/11/2022    RX in labor    Headache(784.0)     Hemorrhoids     Inflammatory bowel disease     Postpartum depression       Laboratory Data:   Labs Reviewed   CBC W/ AUTO DIFFERENTIAL - Abnormal       Result Value    WBC 9.14      RBC 4.29      Hemoglobin 13.4      Hematocrit 38.6      MCV 90      MCH 31.2 (*)     MCHC 34.7      RDW 12.9      Platelets 218      MPV 10.6      Immature Granulocytes 0.3      Gran # (ANC) 7.4      Immature Grans (Abs) 0.03      Lymph # 1.2      Mono # 0.4      Eos # 0.0      Baso # 0.01      nRBC 0      Gran % 81.4 (*)     Lymph % 13.3 (*)     Mono % 4.7      Eosinophil % 0.2      Basophil % 0.1      Differential Method Automated     COMPREHENSIVE METABOLIC PANEL - Abnormal    Sodium 137      Potassium 3.6      Chloride 102      CO2 22 (*)     Glucose 85      BUN 8      Creatinine 0.6      Calcium 9.4      Total Protein 8.0      Albumin 4.4      Total Bilirubin 0.7      Alkaline Phosphatase 32 (*)     AST 16      ALT 10      eGFR >60.0      Anion Gap 13     URINALYSIS, REFLEX TO URINE CULTURE - Abnormal    Specimen UA Urine, Clean Catch      Color, UA Yellow      Appearance, UA Clear   "    pH, UA 6.0      Specific Gravity, UA >=1.030 (*)     Protein, UA Negative      Glucose, UA Negative      Ketones, UA Trace (*)     Bilirubin (UA) Negative      Occult Blood UA Negative      Nitrite, UA Negative      Urobilinogen, UA Negative      Leukocytes, UA Negative      Narrative:     Specimen Source->Urine   DRUG SCREEN PANEL, URINE EMERGENCY - Abnormal    Benzodiazepines Negative      Methadone metabolites Negative      Cocaine (Metab.) Negative      Opiate Scrn, Ur Negative      Barbiturate Screen, Ur Negative      Amphetamine Screen, Ur Negative      THC Presumptive Positive (*)     Phencyclidine Negative      Creatinine, Urine 216.3      Toxicology Information SEE COMMENT      Narrative:     Specimen Source->Urine   TSH    TSH 1.119     ALCOHOL,MEDICAL (ETHANOL)   ACETAMINOPHEN LEVEL   SALICYLATE LEVEL   SARS-COV-2 RDRP GENE    POC Rapid COVID Negative       Acceptable Yes         Neurological History:  Seizures: No  Head trauma: No    Allergies:   Review of patient's allergies indicates:   Allergen Reactions    Latex, natural rubber Swelling and Rash       Medications in ER: Medications - No data to display    Medications at home: zoloft, buspar    No new subjective & objective note has been filed under this hospital service since the last note was generated.      Assessment - Diagnosis - Goals:     Diagnosis/Impression: unspecified depressive disorder, pregnancy    Rec: PEC and inpt treatment - increasing SI with plan to OD on pills, pt states she has increasing relationship triggers with the father of her child and financial pressures triggered the episode, though states long hx of depression     Plan of Care communicated to: md    Time with patient: 23 min      More than 50% of the time was spent counseling/coordinating care    Consulting clinician was informed of the encounter and consult note.    Consultation ended: 11/12/2024 at 1455    Raúl Galicia MD   Psychiatry  [facility]

## 2024-11-12 NOTE — ED NOTES
Moved from bed 7 to bed 23, crackers and water per request of patient given, calm and corporative, no other needs at this time

## 2024-11-12 NOTE — ED NOTES
Belongings collected and placed in hospital bag with patient label::    Socks  Shoes  Cell phone    Hair tie  Sport bra  Pants  Shirt  Purse keys  Carmex  Wallet with mult cards, DL  Pen    All placed behind nurses station and locked
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
## 2024-11-13 PROBLEM — R11.0 NAUSEA: Status: ACTIVE | Noted: 2024-11-13

## 2024-11-13 PROBLEM — D64.9 ANEMIA: Status: ACTIVE | Noted: 2024-11-13

## 2024-11-13 PROBLEM — Z3A.12 12 WEEKS GESTATION OF PREGNANCY: Status: ACTIVE | Noted: 2024-11-13

## 2024-11-25 ENCOUNTER — HOSPITAL ENCOUNTER (EMERGENCY)
Facility: HOSPITAL | Age: 29
Discharge: PSYCHIATRIC OR SUBSTANCE USE FACILITY | End: 2024-11-26
Attending: EMERGENCY MEDICINE
Payer: MEDICAID

## 2024-11-25 DIAGNOSIS — R45.850 HOMICIDAL IDEATIONS: Primary | ICD-10-CM

## 2024-11-25 DIAGNOSIS — F32.A DEPRESSION DURING PREGNANCY IN SECOND TRIMESTER: ICD-10-CM

## 2024-11-25 DIAGNOSIS — O99.342 DEPRESSION DURING PREGNANCY IN SECOND TRIMESTER: ICD-10-CM

## 2024-11-25 LAB
ALBUMIN SERPL BCP-MCNC: 3.5 G/DL (ref 3.5–5.2)
ALP SERPL-CCNC: 34 U/L (ref 40–150)
ALT SERPL W/O P-5'-P-CCNC: 9 U/L (ref 10–44)
AMPHET+METHAMPHET UR QL: NEGATIVE
ANION GAP SERPL CALC-SCNC: 9 MMOL/L (ref 8–16)
APAP SERPL-MCNC: <3 UG/ML (ref 10–20)
AST SERPL-CCNC: 14 U/L (ref 10–40)
B-HCG UR QL: POSITIVE
BARBITURATES UR QL SCN>200 NG/ML: NEGATIVE
BASOPHILS # BLD AUTO: 0.02 K/UL (ref 0–0.2)
BASOPHILS NFR BLD: 0.2 % (ref 0–1.9)
BENZODIAZ UR QL SCN>200 NG/ML: NEGATIVE
BILIRUB SERPL-MCNC: 0.4 MG/DL (ref 0.1–1)
BILIRUB UR QL STRIP: NEGATIVE
BUN SERPL-MCNC: 7 MG/DL (ref 6–20)
BZE UR QL SCN: NEGATIVE
CALCIUM SERPL-MCNC: 8.5 MG/DL (ref 8.7–10.5)
CANNABINOIDS UR QL SCN: ABNORMAL
CHLORIDE SERPL-SCNC: 106 MMOL/L (ref 95–110)
CLARITY UR: CLEAR
CO2 SERPL-SCNC: 20 MMOL/L (ref 23–29)
COLOR UR: YELLOW
CREAT SERPL-MCNC: 0.6 MG/DL (ref 0.5–1.4)
CREAT UR-MCNC: 63.7 MG/DL (ref 15–325)
DIFFERENTIAL METHOD BLD: ABNORMAL
EOSINOPHIL # BLD AUTO: 0 K/UL (ref 0–0.5)
EOSINOPHIL NFR BLD: 0.1 % (ref 0–8)
ERYTHROCYTE [DISTWIDTH] IN BLOOD BY AUTOMATED COUNT: 13 % (ref 11.5–14.5)
EST. GFR  (NO RACE VARIABLE): >60 ML/MIN/1.73 M^2
ETHANOL SERPL-MCNC: <10 MG/DL
GLUCOSE SERPL-MCNC: 74 MG/DL (ref 70–110)
GLUCOSE UR QL STRIP: NEGATIVE
HCT VFR BLD AUTO: 35.1 % (ref 37–48.5)
HGB BLD-MCNC: 12 G/DL (ref 12–16)
HGB UR QL STRIP: NEGATIVE
IMM GRANULOCYTES # BLD AUTO: 0.03 K/UL (ref 0–0.04)
IMM GRANULOCYTES NFR BLD AUTO: 0.3 % (ref 0–0.5)
KETONES UR QL STRIP: ABNORMAL
LEUKOCYTE ESTERASE UR QL STRIP: ABNORMAL
LYMPHOCYTES # BLD AUTO: 1.5 K/UL (ref 1–4.8)
LYMPHOCYTES NFR BLD: 16 % (ref 18–48)
MCH RBC QN AUTO: 30.8 PG (ref 27–31)
MCHC RBC AUTO-ENTMCNC: 34.2 G/DL (ref 32–36)
MCV RBC AUTO: 90 FL (ref 82–98)
METHADONE UR QL SCN>300 NG/ML: NEGATIVE
MICROSCOPIC COMMENT: NORMAL
MONOCYTES # BLD AUTO: 0.4 K/UL (ref 0.3–1)
MONOCYTES NFR BLD: 4.3 % (ref 4–15)
NEUTROPHILS # BLD AUTO: 7.5 K/UL (ref 1.8–7.7)
NEUTROPHILS NFR BLD: 79.1 % (ref 38–73)
NITRITE UR QL STRIP: NEGATIVE
NRBC BLD-RTO: 0 /100 WBC
OPIATES UR QL SCN: NEGATIVE
PCP UR QL SCN>25 NG/ML: NEGATIVE
PH UR STRIP: 7 [PH] (ref 5–8)
PLATELET # BLD AUTO: 174 K/UL (ref 150–450)
PMV BLD AUTO: 10.9 FL (ref 9.2–12.9)
POTASSIUM SERPL-SCNC: 4.3 MMOL/L (ref 3.5–5.1)
PROT SERPL-MCNC: 5.9 G/DL (ref 6–8.4)
PROT UR QL STRIP: NEGATIVE
RBC # BLD AUTO: 3.89 M/UL (ref 4–5.4)
RBC #/AREA URNS HPF: 2 /HPF (ref 0–4)
SALICYLATES SERPL-MCNC: <5 MG/DL (ref 15–30)
SARS-COV-2 RDRP RESP QL NAA+PROBE: NEGATIVE
SODIUM SERPL-SCNC: 135 MMOL/L (ref 136–145)
SP GR UR STRIP: 1.02 (ref 1–1.03)
SQUAMOUS #/AREA URNS HPF: 6 /HPF
TOXICOLOGY INFORMATION: ABNORMAL
TSH SERPL DL<=0.005 MIU/L-ACNC: 1.02 UIU/ML (ref 0.4–4)
URN SPEC COLLECT METH UR: ABNORMAL
UROBILINOGEN UR STRIP-ACNC: NEGATIVE EU/DL
WBC # BLD AUTO: 9.46 K/UL (ref 3.9–12.7)
WBC #/AREA URNS HPF: 4 /HPF (ref 0–5)

## 2024-11-25 PROCEDURE — 80307 DRUG TEST PRSMV CHEM ANLYZR: CPT | Performed by: EMERGENCY MEDICINE

## 2024-11-25 PROCEDURE — 81025 URINE PREGNANCY TEST: CPT | Performed by: EMERGENCY MEDICINE

## 2024-11-25 PROCEDURE — 87635 SARS-COV-2 COVID-19 AMP PRB: CPT | Performed by: EMERGENCY MEDICINE

## 2024-11-25 PROCEDURE — 80053 COMPREHEN METABOLIC PANEL: CPT | Performed by: EMERGENCY MEDICINE

## 2024-11-25 PROCEDURE — 99285 EMERGENCY DEPT VISIT HI MDM: CPT

## 2024-11-25 PROCEDURE — 84443 ASSAY THYROID STIM HORMONE: CPT | Performed by: EMERGENCY MEDICINE

## 2024-11-25 PROCEDURE — 80143 DRUG ASSAY ACETAMINOPHEN: CPT | Performed by: EMERGENCY MEDICINE

## 2024-11-25 PROCEDURE — 80179 DRUG ASSAY SALICYLATE: CPT | Performed by: EMERGENCY MEDICINE

## 2024-11-25 PROCEDURE — 85025 COMPLETE CBC W/AUTO DIFF WBC: CPT | Performed by: EMERGENCY MEDICINE

## 2024-11-25 PROCEDURE — 82077 ASSAY SPEC XCP UR&BREATH IA: CPT | Performed by: EMERGENCY MEDICINE

## 2024-11-25 PROCEDURE — 81000 URINALYSIS NONAUTO W/SCOPE: CPT | Mod: 59 | Performed by: EMERGENCY MEDICINE

## 2024-11-25 RX ORDER — BUSPIRONE HYDROCHLORIDE 10 MG/1
10 TABLET ORAL 2 TIMES DAILY PRN
Status: ON HOLD | COMMUNITY
Start: 2024-10-17 | End: 2024-12-02

## 2024-11-25 RX ORDER — SERTRALINE HYDROCHLORIDE 25 MG/1
25 TABLET, FILM COATED ORAL
Status: ON HOLD | COMMUNITY
End: 2024-12-02

## 2024-11-25 RX ORDER — PROMETHAZINE HYDROCHLORIDE 25 MG/1
25 TABLET ORAL EVERY 6 HOURS PRN
Status: ON HOLD | COMMUNITY
Start: 2024-10-17 | End: 2024-12-02

## 2024-11-25 RX ORDER — ONDANSETRON 8 MG/1
1 TABLET, ORALLY DISINTEGRATING ORAL EVERY 8 HOURS PRN
Status: ON HOLD | COMMUNITY
Start: 2024-09-19 | End: 2024-12-02
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
## 2024-11-26 ENCOUNTER — HOSPITAL ENCOUNTER (INPATIENT)
Facility: HOSPITAL | Age: 29
LOS: 6 days | Discharge: HOME | DRG: 832 | End: 2024-12-02
Attending: STUDENT IN AN ORGANIZED HEALTH CARE EDUCATION/TRAINING PROGRAM | Admitting: STUDENT IN AN ORGANIZED HEALTH CARE EDUCATION/TRAINING PROGRAM
Payer: MEDICAID

## 2024-11-26 VITALS
HEART RATE: 89 BPM | RESPIRATION RATE: 18 BRPM | WEIGHT: 115.31 LBS | TEMPERATURE: 99 F | HEIGHT: 61 IN | OXYGEN SATURATION: 99 % | BODY MASS INDEX: 21.77 KG/M2 | SYSTOLIC BLOOD PRESSURE: 112 MMHG | DIASTOLIC BLOOD PRESSURE: 67 MMHG

## 2024-11-26 DIAGNOSIS — J02.9 SORE THROAT: ICD-10-CM

## 2024-11-26 DIAGNOSIS — O99.342 DEPRESSION DURING PREGNANCY IN SECOND TRIMESTER: Primary | ICD-10-CM

## 2024-11-26 DIAGNOSIS — F32.A DEPRESSION DURING PREGNANCY IN SECOND TRIMESTER: Primary | ICD-10-CM

## 2024-11-26 DIAGNOSIS — Z3A.12 12 WEEKS GESTATION OF PREGNANCY: ICD-10-CM

## 2024-11-26 DIAGNOSIS — R45.850 HOMICIDAL IDEATIONS: ICD-10-CM

## 2024-11-26 PROBLEM — Z3A.14 14 WEEKS GESTATION OF PREGNANCY: Status: ACTIVE | Noted: 2024-11-26

## 2024-11-26 PROCEDURE — 99223 1ST HOSP IP/OBS HIGH 75: CPT | Mod: AF,HB,, | Performed by: STUDENT IN AN ORGANIZED HEALTH CARE EDUCATION/TRAINING PROGRAM

## 2024-11-26 PROCEDURE — 25000003 PHARM REV CODE 250: Performed by: STUDENT IN AN ORGANIZED HEALTH CARE EDUCATION/TRAINING PROGRAM

## 2024-11-26 PROCEDURE — 11400000 HC PSYCH PRIVATE ROOM

## 2024-11-26 PROCEDURE — 90833 PSYTX W PT W E/M 30 MIN: CPT | Mod: AF,HB,, | Performed by: STUDENT IN AN ORGANIZED HEALTH CARE EDUCATION/TRAINING PROGRAM

## 2024-11-26 PROCEDURE — 99231 SBSQ HOSP IP/OBS SF/LOW 25: CPT | Mod: SA,HB,, | Performed by: PHYSICIAN ASSISTANT

## 2024-11-26 RX ORDER — OLANZAPINE 10 MG/1
10 TABLET ORAL EVERY 8 HOURS PRN
Status: DISCONTINUED | OUTPATIENT
Start: 2024-11-26 | End: 2024-12-02 | Stop reason: HOSPADM

## 2024-11-26 RX ORDER — IBUPROFEN 200 MG
1 TABLET ORAL DAILY PRN
Status: DISCONTINUED | OUTPATIENT
Start: 2024-11-26 | End: 2024-12-02 | Stop reason: HOSPADM

## 2024-11-26 RX ORDER — SERTRALINE HYDROCHLORIDE 100 MG/1
100 TABLET, FILM COATED ORAL DAILY
Status: DISCONTINUED | OUTPATIENT
Start: 2024-11-26 | End: 2024-12-02 | Stop reason: HOSPADM

## 2024-11-26 RX ORDER — HYDROXYZINE PAMOATE 50 MG/1
50 CAPSULE ORAL EVERY 6 HOURS PRN
Status: DISCONTINUED | OUTPATIENT
Start: 2024-11-26 | End: 2024-12-02 | Stop reason: HOSPADM

## 2024-11-26 RX ORDER — OLANZAPINE 10 MG/2ML
10 INJECTION, POWDER, FOR SOLUTION INTRAMUSCULAR EVERY 8 HOURS PRN
Status: DISCONTINUED | OUTPATIENT
Start: 2024-11-26 | End: 2024-12-02 | Stop reason: HOSPADM

## 2024-11-26 RX ORDER — ACETAMINOPHEN 325 MG/1
650 TABLET ORAL EVERY 6 HOURS PRN
Status: DISCONTINUED | OUTPATIENT
Start: 2024-11-26 | End: 2024-12-02 | Stop reason: HOSPADM

## 2024-11-26 RX ORDER — TALC
6 POWDER (GRAM) TOPICAL NIGHTLY PRN
Status: DISCONTINUED | OUTPATIENT
Start: 2024-11-26 | End: 2024-12-02 | Stop reason: HOSPADM

## 2024-11-26 RX ADMIN — SERTRALINE HYDROCHLORIDE 100 MG: 100 TABLET ORAL at 03:11

## 2024-11-26 RX ADMIN — Medication 6 MG: at 08:11

## 2024-11-26 RX ADMIN — HYDROXYZINE PAMOATE 50 MG: 50 CAPSULE ORAL at 08:11

## 2024-11-26 NOTE — PROGRESS NOTES
"   11/26/24 1000   [facility] Group Therapy   Group Name Therapeutic Recreation   Specific Interventions Cognitive Stimulation Training   Participation Level Appropriate;Attentive   Participation Quality Cooperative;Social   Insight/Motivation Applies New Skills;Good   Affect/Mood Display Appropriate   Cognition Alert   Psychomotor WNL     Patient presents cooperative, reports an "okay, anxious mood, "I need help with working through some anger." Patient shows interest, stimulated memory recall, and used decision making skills.  "

## 2024-11-26 NOTE — NURSING
Patient arrived to U per wheelchair with hospital security, [facility] staff and ED staff. Patient is a alert,  calm, cooperative and ambulatory.  Personal property inventoried and placed in appropriate  area. Patient's notification of rights, mental health advocacy information, unit rules, schedules, policies and general information reviewed with patient. Handouts given and paper work signed.
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
## 2024-11-26 NOTE — NURSING
New Admit: Pt is sleeping at this time and has slept 1 hr . Resp even & unlabored. Pathways clear. Q 15 minute safety checks ongoing. All precautions maintained.
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
## 2024-11-26 NOTE — PLAN OF CARE
Problem: Adult Behavioral Health Plan of Care  Goal: Optimized Coping Skills in Response to Life Stressors  Outcome: Progressing  Intervention: Promote Effective Coping Strategies  Flowsheets (Taken 11/26/2024 1308)  Supportive Measures:   active listening utilized   counseling provided   problem-solving facilitated   verbalization of feelings encouraged   Process Group        Behavior:  PLPC met with pt individually. Pt presents anxious and depressed.      Intervention:     Social support was discussed in process group this date with emphasis on helping patients identify their social support networks. These social supports are helpful to patients who are attempting to make a change in their situation. Pt identified social supports using a sheet of paper and writing down where Do I get Help? Discussion was held about patients social support and what it means to them furthering their social support network and the importance of being support to others.   Response:     Pt states she has her parents for support.      Plan:   To continue to encourage patient to attend process group and to learn about ways that they may identify social support networks.

## 2024-11-26 NOTE — PLAN OF CARE
Patient calm and cooperative.  Denies intentions to harm self and/or others at this time. Denies auditory and/or visual hallucinations.  Affect and emotion normal and depressed. Thoughts are linear and logical.  Isolates in bed; minimal interactions with peers and staff.  Accepts meals and medications.  Discussed medication and plan of care as well as healthy coping skills and techniques; staff will continue to educate and reinforce.

## 2024-11-26 NOTE — HPI
Patient is a 29 year old female with medical history of anxiety and depression who was admitted to [facility] for homicidal ideation.  Patient is currently 14 weeks pregnant.  Hospital medicine consulted for medical management.

## 2024-11-27 LAB
CHOLEST SERPL-MCNC: 172 MG/DL (ref 120–199)
CHOLEST/HDLC SERPL: 2.9 {RATIO} (ref 2–5)
ESTIMATED AVG GLUCOSE: 97 MG/DL (ref 68–131)
HBA1C MFR BLD: 5 % (ref 4–5.6)
HDLC SERPL-MCNC: 59 MG/DL (ref 40–75)
HDLC SERPL: 34.3 % (ref 20–50)
LDLC SERPL CALC-MCNC: 103.2 MG/DL (ref 63–159)
NONHDLC SERPL-MCNC: 113 MG/DL
TRIGL SERPL-MCNC: 49 MG/DL (ref 30–150)

## 2024-11-27 PROCEDURE — 83036 HEMOGLOBIN GLYCOSYLATED A1C: CPT | Performed by: STUDENT IN AN ORGANIZED HEALTH CARE EDUCATION/TRAINING PROGRAM

## 2024-11-27 PROCEDURE — 11400000 HC PSYCH PRIVATE ROOM

## 2024-11-27 PROCEDURE — 80061 LIPID PANEL: CPT | Performed by: STUDENT IN AN ORGANIZED HEALTH CARE EDUCATION/TRAINING PROGRAM

## 2024-11-27 PROCEDURE — 36415 COLL VENOUS BLD VENIPUNCTURE: CPT | Performed by: STUDENT IN AN ORGANIZED HEALTH CARE EDUCATION/TRAINING PROGRAM

## 2024-11-27 PROCEDURE — 25000003 PHARM REV CODE 250: Performed by: STUDENT IN AN ORGANIZED HEALTH CARE EDUCATION/TRAINING PROGRAM

## 2024-11-27 PROCEDURE — 99233 SBSQ HOSP IP/OBS HIGH 50: CPT | Mod: AF,HB,, | Performed by: STUDENT IN AN ORGANIZED HEALTH CARE EDUCATION/TRAINING PROGRAM

## 2024-11-27 RX ADMIN — SERTRALINE HYDROCHLORIDE 100 MG: 100 TABLET ORAL at 08:11

## 2024-11-27 RX ADMIN — Medication 6 MG: at 08:11

## 2024-11-27 RX ADMIN — HYDROXYZINE PAMOATE 50 MG: 50 CAPSULE ORAL at 08:11

## 2024-11-27 NOTE — PLAN OF CARE
Problem: Adult Behavioral Health Plan of Care  Goal: Optimized Coping Skills in Response to Life Stressors  Outcome: Progressing  Intervention: Promote Effective Coping Strategies  Flowsheets (Taken 11/27/2024 1885)  Supportive Measures:   active listening utilized   counseling provided   goal-setting facilitated     PROCESS GROUP:  PLPC met with pt individually.  PLPC attempted to  discuss with pt on group discussion. Pt was resting in bed quietly. Pt  states she is tried and can not think at this time. PLPC discussed with pt PLPC will come back at a later time and date to discuss group.

## 2024-11-27 NOTE — NURSING
Pt slept approximately 8.5 hours. Resp even and unlabored. Safety checks ongoing and precautions maintained.

## 2024-11-27 NOTE — CONSULTS
"[facility] Hospital Medicine  Consult Note    Patient Name: Gayla Montana  MRN: [id number]  Admission Date: 11/26/2024  Hospital Length of Stay: 0 days  Attending Physician: Gab Plascencia III, MD   Primary Care Provider: [facility]           Patient information was obtained from patient and ER records.     Inpatient consult to Family Practice for History and Physical  Consult performed by: Erin Gay PA-C  Consult ordered by: Gab Plascencia III, MD        Subjective:     Principal Problem: <principal problem not specified>    Chief Complaint: No chief complaint on file.       HPI: Patient is a 29 year old female with medical history of anxiety and depression who was admitted to [facility] for homicidal ideation.  Patient is currently 14 weeks pregnant.  Hospital medicine consulted for medical management.        Past Medical History:   Diagnosis Date    Abnormal Pap smear of cervix     MITZI 2    Anemia     Anxiety     Depression     Depression 11/05/2021    GBS (group B Streptococcus carrier), +RV culture, currently pregnant 10/11/2022    RX in labor    Headache(784.0)     Hemorrhoids     History of psychiatric hospitalization     [facility] 11/3/2024 to 11/21/2024; Current: [facility] 11/26/2024    Hx of psychiatric care     Zoloft 25mg (remote history); Buspar 10mg (10/17/2024); Zoloft 100mg (11/22/2024)    Inflammatory bowel disease     Postpartum depression     Psychiatric problem     Depression; Anxiety; Suicidal ideations; Homicidal ideations; anhedonia; sleep disturbance; "nightmares".       Past Surgical History:   Procedure Laterality Date    ADENOIDECTOMY      APPENDECTOMY      CERVICAL BIOPSY  W/ LOOP ELECTRODE EXCISION      FOOT SURGERY      TONSILLECTOMY         Review of patient's allergies indicates:   Allergen Reactions    Latex Rash    Latex, natural rubber Swelling and Rash    Tomato Swelling       No current facility-administered medications on file " prior to encounter.     Current Outpatient Medications on File Prior to Encounter   Medication Sig    lurasidone (LATUDA) 20 mg Tab tablet Take 1 tablet (20 mg total) by mouth daily with lunch.    sertraline (ZOLOFT) 100 MG tablet Take 1 tablet (100 mg total) by mouth once daily.    busPIRone (BUSPAR) 10 MG tablet Take 10 mg by mouth 2 (two) times daily as needed.    docusate sodium (COLACE) 100 MG capsule Take 1 capsule (100 mg total) by mouth 2 (two) times daily as needed for Constipation.    ondansetron (ZOFRAN-ODT) 8 MG TbDL Take 1 tablet (8 mg total) by mouth every 6 (six) hours as needed (Nausea).    ondansetron (ZOFRAN-ODT) 8 MG TbDL Take 1 tablet by mouth every 8 (eight) hours as needed.    PNV no.95/ferrous fum/folic ac (PRENATAL ORAL) Take by mouth.    promethazine (PHENERGAN) 25 MG tablet Take 25 mg by mouth every 6 (six) hours as needed.    sertraline (ZOLOFT) 25 MG tablet Take 25 mg by mouth.     Family History       Problem Relation (Age of Onset)    Diabetes Mother, Father    Multiple sclerosis Maternal Aunt    No Known Problems Sister, Brother    Ovarian cancer Mother, Maternal Aunt          Tobacco Use    Smoking status: Never    Smokeless tobacco: Never   Substance and Sexual Activity    Alcohol use: Not Currently     Alcohol/week: 1.0 standard drink of alcohol     Types: 1 Glasses of wine per week     Comment: socially    Drug use: Yes     Types: Marijuana     Comment: Daily    Sexual activity: Not Currently     Partners: Male     Review of Systems   Constitutional:  Negative for chills and fever.   HENT:  Negative for congestion and drooling.    Respiratory:  Negative for cough and shortness of breath.    Cardiovascular:  Negative for chest pain and leg swelling.   Gastrointestinal:  Negative for constipation, diarrhea, nausea and vomiting.   Genitourinary:  Negative for difficulty urinating and dysuria.   Musculoskeletal:  Negative for arthralgias and gait problem.     Objective:     Vital Signs  (Most Recent):  Temp: 98.5 °F (36.9 °C) (11/26/24 0722)  Pulse: 99 (11/26/24 0722)  Resp: 18 (11/26/24 0722)  BP: 109/69 (11/26/24 0722)  SpO2: 99 % (11/26/24 0722) Vital Signs (24h Range):  Temp:  [97.5 °F (36.4 °C)-98.9 °F (37.2 °C)] 98.5 °F (36.9 °C)  Pulse:  [74-99] 99  Resp:  [16-19] 18  SpO2:  [96 %-99 %] 99 %  BP: (106-125)/(56-88) 109/69     Weight: 50.5 kg (111 lb 7.1 oz)  Body mass index is 21.06 kg/m².     Physical Exam  Constitutional:       Appearance: Normal appearance.   HENT:      Head: Normocephalic and atraumatic.   Cardiovascular:      Rate and Rhythm: Normal rate and regular rhythm.   Pulmonary:      Effort: Pulmonary effort is normal. No respiratory distress.      Breath sounds: Normal breath sounds.   Abdominal:      General: Abdomen is flat. There is no distension.      Palpations: Abdomen is soft.   Musculoskeletal:      Right lower leg: No edema.      Left lower leg: No edema.   Skin:     General: Skin is warm and dry.   Neurological:      Mental Status: She is alert and oriented to person, place, and time. Mental status is at baseline.      Comments: CN II: Visual Fields full to confrontation  CN III, IV , VI PERRL   CN III: no palsy   Nystagmus: none  Diplopia: none  Ophthalmoparesis: none  CN V Facial sensation intact  CN VII facial expression full, symmetric  CN VIII normal  CN IX normal  CN X normal  CN XI normal  CN XII normal                 Significant Labs: UPT  Results for orders placed or performed in visit on 08/12/20   POCT urine pregnancy    Collection Time: 08/12/20  2:57 PM   Result Value Ref Range    POC Preg Test, Ur Positive (A) Negative     Acceptable Yes      U/A  U/A negative for UTI  UDS  Results for orders placed or performed during the hospital encounter of 11/25/24   Drug screen panel, emergency    Collection Time: 11/25/24  2:25 PM   Result Value Ref Range    Benzodiazepines Negative Negative    Methadone metabolites Negative Negative    Cocaine  (Metab.) Negative Negative    Opiate Scrn, Ur Negative Negative    Barbiturate Screen, Ur Negative Negative    Amphetamine Screen, Ur Negative Negative    THC Presumptive Positive (A) Negative    Phencyclidine Negative Negative    Creatinine, Urine 63.7 15.0 - 325.0 mg/dL    Toxicology Information SEE COMMENT      CBC  Results for orders placed or performed during the hospital encounter of 11/25/24   CBC auto differential    Collection Time: 11/25/24  4:09 PM   Result Value Ref Range    WBC 9.46 3.90 - 12.70 K/uL    RBC 3.89 (L) 4.00 - 5.40 M/uL    Hemoglobin 12.0 12.0 - 16.0 g/dL    Hematocrit 35.1 (L) 37.0 - 48.5 %    MCV 90 82 - 98 fL    MCH 30.8 27.0 - 31.0 pg    MCHC 34.2 32.0 - 36.0 g/dL    RDW 13.0 11.5 - 14.5 %    Platelets 174 150 - 450 K/uL    MPV 10.9 9.2 - 12.9 fL    Immature Granulocytes 0.3 0.0 - 0.5 %    Gran # (ANC) 7.5 1.8 - 7.7 K/uL    Immature Grans (Abs) 0.03 0.00 - 0.04 K/uL    Lymph # 1.5 1.0 - 4.8 K/uL    Mono # 0.4 0.3 - 1.0 K/uL    Eos # 0.0 0.0 - 0.5 K/uL    Baso # 0.02 0.00 - 0.20 K/uL    nRBC 0 0 /100 WBC    Gran % 79.1 (H) 38.0 - 73.0 %    Lymph % 16.0 (L) 18.0 - 48.0 %    Mono % 4.3 4.0 - 15.0 %    Eosinophil % 0.1 0.0 - 8.0 %    Basophil % 0.2 0.0 - 1.9 %    Differential Method Automated      CMP  Results for orders placed or performed during the hospital encounter of 11/25/24   Comprehensive metabolic panel    Collection Time: 11/25/24  4:09 PM   Result Value Ref Range    Sodium 135 (L) 136 - 145 mmol/L    Potassium 4.3 3.5 - 5.1 mmol/L    Chloride 106 95 - 110 mmol/L    CO2 20 (L) 23 - 29 mmol/L    Glucose 74 70 - 110 mg/dL    BUN 7 6 - 20 mg/dL    Creatinine 0.6 0.5 - 1.4 mg/dL    Calcium 8.5 (L) 8.7 - 10.5 mg/dL    Total Protein 5.9 (L) 6.0 - 8.4 g/dL    Albumin 3.5 3.5 - 5.2 g/dL    Total Bilirubin 0.4 0.1 - 1.0 mg/dL    Alkaline Phosphatase 34 (L) 40 - 150 U/L    AST 14 10 - 40 U/L    ALT 9 (L) 10 - 44 U/L    eGFR >60 >60 mL/min/1.73 m^2    Anion Gap 9 8 - 16 mmol/L      TSH  Results for orders placed or performed during the hospital encounter of 11/25/24   TSH    Collection Time: 11/25/24  4:09 PM   Result Value Ref Range    TSH 1.019 0.400 - 4.000 uIU/mL     ETOH  Results for orders placed or performed during the hospital encounter of 11/25/24   Ethanol    Collection Time: 11/25/24  4:09 PM   Result Value Ref Range    Alcohol, Serum <10 <10 mg/dL     Salicylate  Results for orders placed or performed during the hospital encounter of 11/25/24   Salicylate level    Collection Time: 11/25/24  4:09 PM   Result Value Ref Range    Salicylate Lvl <5.0 (L) 15.0 - 30.0 mg/dL     Acetaminophen  Results for orders placed or performed during the hospital encounter of 11/25/24   Acetaminophen level    Collection Time: 11/25/24  4:09 PM   Result Value Ref Range    Acetaminophen (Tylenol), Serum <3.0 (L) 10.0 - 20.0 ug/mL             Significant Imaging: none  Assessment/Plan:     14 weeks gestation of pregnancy  Recommend close obgyn f/u and continue prenatal vitamins  FHT documented at 150 bpm      Depression  Defer to psych          VTE Risk Mitigation (From admission, onward)      None                Thank you for your consult. I will sign off. Please contact us if you have any additional questions.    Erin Gay PA-C  Department of Hospital Medicine   [facility]

## 2024-11-27 NOTE — SUBJECTIVE & OBJECTIVE
"Past Medical History:   Diagnosis Date    Abnormal Pap smear of cervix     MITZI 2    Anemia     Anxiety     Depression     Depression 11/05/2021    GBS (group B Streptococcus carrier), +RV culture, currently pregnant 10/11/2022    RX in labor    Headache(784.0)     Hemorrhoids     History of psychiatric hospitalization     [facility] 11/3/2024 to 11/21/2024; Current: [facility] 11/26/2024    Hx of psychiatric care     Zoloft 25mg (remote history); Buspar 10mg (10/17/2024); Zoloft 100mg (11/22/2024)    Inflammatory bowel disease     Postpartum depression     Psychiatric problem     Depression; Anxiety; Suicidal ideations; Homicidal ideations; anhedonia; sleep disturbance; "nightmares".       Past Surgical History:   Procedure Laterality Date    ADENOIDECTOMY      APPENDECTOMY      CERVICAL BIOPSY  W/ LOOP ELECTRODE EXCISION      FOOT SURGERY      TONSILLECTOMY         Review of patient's allergies indicates:   Allergen Reactions    Latex Rash    Latex, natural rubber Swelling and Rash    Tomato Swelling       No current facility-administered medications on file prior to encounter.     Current Outpatient Medications on File Prior to Encounter   Medication Sig    lurasidone (LATUDA) 20 mg Tab tablet Take 1 tablet (20 mg total) by mouth daily with lunch.    sertraline (ZOLOFT) 100 MG tablet Take 1 tablet (100 mg total) by mouth once daily.    busPIRone (BUSPAR) 10 MG tablet Take 10 mg by mouth 2 (two) times daily as needed.    docusate sodium (COLACE) 100 MG capsule Take 1 capsule (100 mg total) by mouth 2 (two) times daily as needed for Constipation.    ondansetron (ZOFRAN-ODT) 8 MG TbDL Take 1 tablet (8 mg total) by mouth every 6 (six) hours as needed (Nausea).    ondansetron (ZOFRAN-ODT) 8 MG TbDL Take 1 tablet by mouth every 8 (eight) hours as needed.    PNV no.95/ferrous fum/folic ac (PRENATAL ORAL) Take by mouth.    promethazine (PHENERGAN) 25 MG tablet Take 25 mg by mouth every 6 (six) hours as needed.    " sertraline (ZOLOFT) 25 MG tablet Take 25 mg by mouth.     Family History       Problem Relation (Age of Onset)    Diabetes Mother, Father    Multiple sclerosis Maternal Aunt    No Known Problems Sister, Brother    Ovarian cancer Mother, Maternal Aunt          Tobacco Use    Smoking status: Never    Smokeless tobacco: Never   Substance and Sexual Activity    Alcohol use: Not Currently     Alcohol/week: 1.0 standard drink of alcohol     Types: 1 Glasses of wine per week     Comment: socially    Drug use: Yes     Types: Marijuana     Comment: Daily    Sexual activity: Not Currently     Partners: Male     Review of Systems   Constitutional:  Negative for chills and fever.   HENT:  Negative for congestion and drooling.    Respiratory:  Negative for cough and shortness of breath.    Cardiovascular:  Negative for chest pain and leg swelling.   Gastrointestinal:  Negative for constipation, diarrhea, nausea and vomiting.   Genitourinary:  Negative for difficulty urinating and dysuria.   Musculoskeletal:  Negative for arthralgias and gait problem.     Objective:     Vital Signs (Most Recent):  Temp: 98.5 °F (36.9 °C) (11/26/24 0722)  Pulse: 99 (11/26/24 0722)  Resp: 18 (11/26/24 0722)  BP: 109/69 (11/26/24 0722)  SpO2: 99 % (11/26/24 0722) Vital Signs (24h Range):  Temp:  [97.5 °F (36.4 °C)-98.9 °F (37.2 °C)] 98.5 °F (36.9 °C)  Pulse:  [74-99] 99  Resp:  [16-19] 18  SpO2:  [96 %-99 %] 99 %  BP: (106-125)/(56-88) 109/69     Weight: 50.5 kg (111 lb 7.1 oz)  Body mass index is 21.06 kg/m².     Physical Exam  Constitutional:       Appearance: Normal appearance.   HENT:      Head: Normocephalic and atraumatic.   Cardiovascular:      Rate and Rhythm: Normal rate and regular rhythm.   Pulmonary:      Effort: Pulmonary effort is normal. No respiratory distress.      Breath sounds: Normal breath sounds.   Abdominal:      General: Abdomen is flat. There is no distension.      Palpations: Abdomen is soft.   Musculoskeletal:      Right  lower leg: No edema.      Left lower leg: No edema.   Skin:     General: Skin is warm and dry.   Neurological:      Mental Status: She is alert and oriented to person, place, and time. Mental status is at baseline.      Comments: CN II: Visual Fields full to confrontation  CN III, IV , VI PERRL   CN III: no palsy   Nystagmus: none  Diplopia: none  Ophthalmoparesis: none  CN V Facial sensation intact  CN VII facial expression full, symmetric  CN VIII normal  CN IX normal  CN X normal  CN XI normal  CN XII normal                 Significant Labs: UPT  Results for orders placed or performed in visit on 08/12/20   POCT urine pregnancy    Collection Time: 08/12/20  2:57 PM   Result Value Ref Range    POC Preg Test, Ur Positive (A) Negative     Acceptable Yes      U/A  U/A negative for UTI  UDS  Results for orders placed or performed during the hospital encounter of 11/25/24   Drug screen panel, emergency    Collection Time: 11/25/24  2:25 PM   Result Value Ref Range    Benzodiazepines Negative Negative    Methadone metabolites Negative Negative    Cocaine (Metab.) Negative Negative    Opiate Scrn, Ur Negative Negative    Barbiturate Screen, Ur Negative Negative    Amphetamine Screen, Ur Negative Negative    THC Presumptive Positive (A) Negative    Phencyclidine Negative Negative    Creatinine, Urine 63.7 15.0 - 325.0 mg/dL    Toxicology Information SEE COMMENT      CBC  Results for orders placed or performed during the hospital encounter of 11/25/24   CBC auto differential    Collection Time: 11/25/24  4:09 PM   Result Value Ref Range    WBC 9.46 3.90 - 12.70 K/uL    RBC 3.89 (L) 4.00 - 5.40 M/uL    Hemoglobin 12.0 12.0 - 16.0 g/dL    Hematocrit 35.1 (L) 37.0 - 48.5 %    MCV 90 82 - 98 fL    MCH 30.8 27.0 - 31.0 pg    MCHC 34.2 32.0 - 36.0 g/dL    RDW 13.0 11.5 - 14.5 %    Platelets 174 150 - 450 K/uL    MPV 10.9 9.2 - 12.9 fL    Immature Granulocytes 0.3 0.0 - 0.5 %    Gran # (ANC) 7.5 1.8 - 7.7 K/uL     Immature Grans (Abs) 0.03 0.00 - 0.04 K/uL    Lymph # 1.5 1.0 - 4.8 K/uL    Mono # 0.4 0.3 - 1.0 K/uL    Eos # 0.0 0.0 - 0.5 K/uL    Baso # 0.02 0.00 - 0.20 K/uL    nRBC 0 0 /100 WBC    Gran % 79.1 (H) 38.0 - 73.0 %    Lymph % 16.0 (L) 18.0 - 48.0 %    Mono % 4.3 4.0 - 15.0 %    Eosinophil % 0.1 0.0 - 8.0 %    Basophil % 0.2 0.0 - 1.9 %    Differential Method Automated      CMP  Results for orders placed or performed during the hospital encounter of 11/25/24   Comprehensive metabolic panel    Collection Time: 11/25/24  4:09 PM   Result Value Ref Range    Sodium 135 (L) 136 - 145 mmol/L    Potassium 4.3 3.5 - 5.1 mmol/L    Chloride 106 95 - 110 mmol/L    CO2 20 (L) 23 - 29 mmol/L    Glucose 74 70 - 110 mg/dL    BUN 7 6 - 20 mg/dL    Creatinine 0.6 0.5 - 1.4 mg/dL    Calcium 8.5 (L) 8.7 - 10.5 mg/dL    Total Protein 5.9 (L) 6.0 - 8.4 g/dL    Albumin 3.5 3.5 - 5.2 g/dL    Total Bilirubin 0.4 0.1 - 1.0 mg/dL    Alkaline Phosphatase 34 (L) 40 - 150 U/L    AST 14 10 - 40 U/L    ALT 9 (L) 10 - 44 U/L    eGFR >60 >60 mL/min/1.73 m^2    Anion Gap 9 8 - 16 mmol/L     TSH  Results for orders placed or performed during the hospital encounter of 11/25/24   TSH    Collection Time: 11/25/24  4:09 PM   Result Value Ref Range    TSH 1.019 0.400 - 4.000 uIU/mL     ETOH  Results for orders placed or performed during the hospital encounter of 11/25/24   Ethanol    Collection Time: 11/25/24  4:09 PM   Result Value Ref Range    Alcohol, Serum <10 <10 mg/dL     Salicylate  Results for orders placed or performed during the hospital encounter of 11/25/24   Salicylate level    Collection Time: 11/25/24  4:09 PM   Result Value Ref Range    Salicylate Lvl <5.0 (L) 15.0 - 30.0 mg/dL     Acetaminophen  Results for orders placed or performed during the hospital encounter of 11/25/24   Acetaminophen level    Collection Time: 11/25/24  4:09 PM   Result Value Ref Range    Acetaminophen (Tylenol), Serum <3.0 (L) 10.0 - 20.0 ug/mL              Significant Imaging: none

## 2024-11-27 NOTE — PROGRESS NOTES
"PSYCHIATRY DAILY INPATIENT PROGRESS NOTE  SUBSEQUENT HOSPITAL VISIT    ENCOUNTER DATE: 11/27/2024  [facility] [place]    DATE OF ADMISSION: 11/26/2024  3:58 AM  LENGTH OF STAY: 1 days      CHIEF COMPLAINT   Gayla Montana is a 29 y.o. female, seen during daily salcido rounds on the inpatient unit.  Gayla Montana presented with the chief complaint of  HI      The patient was seen and examined. The chart was reviewed.     Reviewed notes from Rns and PA and labs from the last 24 hours.    The patient's case was discussed with the treatment team/care providers today including Rns    Staff reports no behavioral or management issues.     The patient has been compliant with treatment.      Subjective 11/27/2024       Today the patient reports mood improving somewhat, "I am starting to be more social... I am breathing and praying through the upsetting thoughts."    States she does have access to knives at home, "I'm a ."    Reports she feels better off latuda, no longer having stiffness.    The patient denies any side effects to medications.    At this time symptoms remains severe, functionally and behaviorally impairing and pt remains in need of continued acute inpatient hospitalization for both safety and stabilization.           Interim/overnight events per report/notes:          Psychiatric ROS (observed, reported, or endorsed/denied):  Depressed mood - fluctuating  Interest/pleasure/anhedonia: less  Guilt/hopelessness/worthlessness - fluctuating  Changes in Sleep - less  Changes in Appetite - less  Changes in Concentration - less  Changes in Energy - less  PMA/R- less  Suicidal- active/passive ideations - denies  Homicidal ideations: active/passive ideations - less    Hallucinations - No  Delusions - No  Disorganized behavior - No  Disorganized speech - No  Negative symptoms - No    Elevated mood - No  Decreased need for sleep - No  Grandiosity - No  Racing thoughts - No  Impulsivity - " "No  Irritability- No  Increased energy - No  Distractibility - No  Increase in goal-directed activity or PMA- No    Symptoms of CATALINA - fluctuating  Symptoms of Panic Disorder- fluctuating  Symptoms of PTSD - No        Overall progress: Patient is showing mild improvement           Medical ROS  Review of Systems   Constitutional:  Negative for chills and fever.   HENT:  Negative for hearing loss.    Eyes:  Negative for blurred vision and double vision.   Respiratory:  Negative for shortness of breath.    Cardiovascular:  Negative for chest pain and palpitations.   Gastrointestinal:  Negative for constipation, diarrhea, nausea and vomiting.   Genitourinary:  Negative for dysuria.   Musculoskeletal:  Negative for back pain and neck pain.   Skin:  Negative for rash.   Neurological:  Negative for dizziness and headaches.   Endo/Heme/Allergies:  Negative for environmental allergies.         PAST MEDICAL HISTORY   Past Medical History:   Diagnosis Date    Abnormal Pap smear of cervix     MITZI 2    Anemia     Anxiety     Depression     Depression 11/05/2021    GBS (group B Streptococcus carrier), +RV culture, currently pregnant 10/11/2022    RX in labor    Headache(784.0)     Hemorrhoids     History of psychiatric hospitalization     [facility] 11/3/2024 to 11/21/2024; Current: [facility] 11/26/2024    Hx of psychiatric care     Zoloft 25mg (remote history); Buspar 10mg (10/17/2024); Zoloft 100mg (11/22/2024)    Inflammatory bowel disease     Postpartum depression     Psychiatric problem     Depression; Anxiety; Suicidal ideations; Homicidal ideations; anhedonia; sleep disturbance; "nightmares".           PSYCHOTROPIC MEDICATIONS   Scheduled Meds:   sertraline  100 mg Oral Daily     Continuous Infusions:  PRN Meds:.  Current Facility-Administered Medications:     acetaminophen, 650 mg, Oral, Q6H PRN    hydrOXYzine pamoate, 50 mg, Oral, Q6H PRN    melatonin, 6 mg, Oral, Nightly PRN    nicotine, 1 patch, Transdermal, " Daily PRN    OLANZapine, 10 mg, Oral, Q8H PRN **AND** OLANZapine, 10 mg, Intramuscular, Q8H PRN        EXAMINATION    VITALS   Vitals:    11/26/24 0722 11/26/24 1911 11/27/24 0715 11/27/24 0813   BP: 109/69 (!) 121/58 (!) 101/57    BP Location:  Right arm     Patient Position: Sitting Sitting     Pulse: 99 96 67    Resp: 18 16 18    Temp: 98.5 °F (36.9 °C) 98.4 °F (36.9 °C) 98.1 °F (36.7 °C)    TempSrc: Tympanic      SpO2: 99% 99% 98%    Weight:    50.6 kg (111 lb 8.8 oz)   Height:           Body mass index is 21.08 kg/m².        CONSTITUTIONAL  General Appearance: unremarkable, age appropriate    MUSCULOSKELETAL  Muscle Strength and Tone:no tremor, no tic  Abnormal Involuntary Movements: No  Gait and Station: non-ataxic    PSYCHIATRIC   Level of Consciousness: awake and alert   Orientation: person, place, and situation  Grooming: Casually dressed and Well groomed  Psychomotor Behavior: normal, cooperative  Speech: normal tone, normal rate, normal pitch, normal volume  Language: grossly intact  Mood: anxious  Affect: Consistent with mood  Thought Process: linear, logical  Associations: intact   Thought Content: less HI, denies SI, and no delusions  Perceptions: denies AH and denies  VH  Memory: Able to recall past events, Remote intact, and Recent intact  Attention:Attends to interview without distraction  Fund of Knowledge: Aware of current events and Vocabulary appropriate   Estimate if Intelligence:  Average based on work/education history, vocabulary and mental status exam  Insight: has awareness of illness  Judgment: behavior is adequate to circumstances        DIAGNOSTIC TESTING   Laboratory Results  Recent Results (from the past 24 hours)   Lipid panel    Collection Time: 11/27/24  6:11 AM   Result Value Ref Range    Cholesterol 172 120 - 199 mg/dL    Triglycerides 49 30 - 150 mg/dL    HDL 59 40 - 75 mg/dL    LDL Cholesterol 103.2 63.0 - 159.0 mg/dL    HDL/Cholesterol Ratio 34.3 20.0 - 50.0 %    Total  Cholesterol/HDL Ratio 2.9 2.0 - 5.0    Non-HDL Cholesterol 113 mg/dL   Hemoglobin A1c    Collection Time: 11/27/24  6:11 AM   Result Value Ref Range    Hemoglobin A1C 5.0 4.0 - 5.6 %    Estimated Avg Glucose 97 68 - 131 mg/dL         MEDICAL DECISION MAKING          ASSESSMENT         MDD  HI  CATALINA  Panic disorder  Psychosocial stressors  Cannabis abuse     Pregnancy           PROBLEM LIST AND MANAGEMENT PLANS          MDD  - continue zoloft 100 mg PO qd  - hold latuda for now due to c/o paresthesia/stiffness  - pt counseled  - follow up with outpatient mental health providers after discharge for medication management and psychotherapy     CATALINA  - continue zoloft 100 mg PO qd  - pt counseled  - follow up with outpatient mental health providers after discharge for medication management and psychotherapy     Homicidal ideations  - continue psychiatric hospitalization  - provide psychotherapeutic interventions and medication management     Panic disorder  - continue zoloft 100 mg PO qd  - pt counseled  - follow up with outpatient mental health providers after discharge for medication management and psychotherapy     Psychosocial stressors  - pt counseled  - SW consulted for assistance with additional resources      Cannabis abuse  - pt counseled  - follow up with outpatient mental health providers after discharge for medication management and psychotherapy        Pregnancy  -OBGYN consult if needed               Discussed diagnosis, risks and benefits of proposed treatment vs alternative treatments vs no treatment, potential side effects of these treatments and the inherent unpredictability of treatment. The patient expresses understanding of the above and displays the capacity to agree with this treatment given said understanding. Patient also agrees that, currently, the benefits outweigh the risks and would like to pursue/continue treatment at this time.    Any medications being used off-label were discussed with the  patient inclusive of the evidence base for the use of the medications and consent was obtained for the off-label use of the medication.       DISCHARGE PLANNING  Expected Disposition Plan: Home or Self Care      NEED FOR CONTINUED HOSPITALIZATION  Psychiatric illness continues to pose a potential threat to life or bodily function, of self or others, thereby requiring the need for continued inpatient psychiatric hospitalization: Yes, due to: danger to others, as evidenced by:  Ongoing concerns with Active HI/Threatening behavior.    Protective inpatient pyschiatric hospitalization required while a safe disposition plan is enacted: Yes    Patient stabilized and ready for discharge from inpatient psychiatric unit: No        STAFF:   Gab Plascencia III, MD  Psychiatry

## 2024-11-27 NOTE — PLAN OF CARE
Patient isolating in room. No interaction with peers. Patient states she wanted to hurt her ex boyfriends' new girlfriend for calling the  on her but I don't anymore. Patient states she was upset because they were keeping her kids from her. Patient states she smoked marijuana for all her pregnancies including this one. Patient states she is thinking about giving baby up for adoption but her mother does not want her to. Patient rates depression 6/10 on scale 1-10 with 10 highest level of depression and rates anxiety 6/10 on same scale. Patient denies SI/HI. No self harming behavior displayed, no aggressive behavior displayed towards others. Patient contracted safety with staff and unit. Discussed healthy coping skills and techniques. Educated, reviewed and discussed plan of care and medication regiment with this patient 1:1. Patient voiced understanding of all teachings.

## 2024-11-27 NOTE — PLAN OF CARE
Pt calm and cooperative. Isolated in room tonight. Compliant with medications. Patient denies SI/HI no self harming behavior displayed, no aggressive behavior displayed towards others. Patient contracted safety with staff and unit.  Educated, reviewed  and discussed plan of care and medication regiment with this patient. Patient voiced understanding of all teachings.

## 2024-11-28 PROCEDURE — 99233 SBSQ HOSP IP/OBS HIGH 50: CPT | Mod: AF,HB,, | Performed by: PSYCHIATRY & NEUROLOGY

## 2024-11-28 PROCEDURE — 11400000 HC PSYCH PRIVATE ROOM

## 2024-11-28 PROCEDURE — 25000003 PHARM REV CODE 250: Performed by: STUDENT IN AN ORGANIZED HEALTH CARE EDUCATION/TRAINING PROGRAM

## 2024-11-28 PROCEDURE — 25000003 PHARM REV CODE 250: Performed by: PSYCHIATRY & NEUROLOGY

## 2024-11-28 PROCEDURE — 90833 PSYTX W PT W E/M 30 MIN: CPT | Mod: AF,HB,, | Performed by: PSYCHIATRY & NEUROLOGY

## 2024-11-28 RX ADMIN — SERTRALINE HYDROCHLORIDE 100 MG: 100 TABLET ORAL at 08:11

## 2024-11-28 RX ADMIN — HYDROXYZINE PAMOATE 50 MG: 50 CAPSULE ORAL at 07:11

## 2024-11-28 RX ADMIN — Medication 6 MG: at 07:11

## 2024-11-28 NOTE — PROGRESS NOTES
"PSYCHIATRY DAILY INPATIENT PROGRESS NOTE  SUBSEQUENT HOSPITAL VISIT    ENCOUNTER DATE: 11/28/2024  [facility]    DATE OF ADMISSION: 11/26/2024  3:58 AM  LENGTH OF STAY: 2 days      CHIEF COMPLAINT   Gayla Montana is a 29 y.o. female, seen during daily salcido rounds on the inpatient unit.  Gayla Montana presented with the chief complaint of  HI      The patient was seen and examined. The chart was reviewed.     Reviewed notes from Rns, MD, and LPC and labs from the last 24 hours.    The patient's case was discussed with the treatment team/care providers today including Rns    Staff reports no behavioral or management issues.     The patient has been compliant with treatment.      Subjective 11/28/2024       Today the patient again reports that her mood I steadily improving. She notes decreasing symptoms of depression and anxiety.     States she does have access to knives at home, "I'm a ." She denied any HI or thoughts of hurting anyone. "I was upset and said that I did have thoughts of hurting her, but I would not do that." She cites "not being able to see her kids anymore" as the primary reason she would never hurt herself."    Reports she continues to feels better off latuda, no longer having stiffness.AIMS score was 0.    The patient denies any side effects to medications.    At this time symptoms remains severe, functionally and behaviorally impairing and pt remains in need of continued acute inpatient hospitalization for both safety and stabilization.             Psychiatric ROS (observed, reported, or endorsed/denied):  Depressed mood - decreasing steadily  Interest/pleasure/anhedonia: decreasing steadily  Guilt/hopelessness/worthlessness - decreasing steadily  Changes in Sleep - decreasing steadily  Changes in Appetite - decreasing steadily  Changes in Concentration - decreasing steadily  Changes in Energy - decreasing steadily  PMA/R- decreasing steadily  Suicidal- " active/passive ideations - denies  Homicidal ideations: active/passive ideations - denies    Hallucinations - No  Delusions - No  Disorganized behavior - No  Disorganized speech - No  Negative symptoms - No    Elevated mood - No  Decreased need for sleep - No  Grandiosity - No  Racing thoughts - No  Impulsivity - No  Irritability- No  Increased energy - No  Distractibility - No  Increase in goal-directed activity or PMA- No    Symptoms of CATALINA - decreasing steadily  Symptoms of Panic Disorder- decreasing steadily  Symptoms of PTSD - No        Overall progress: Patient is showing moderate improvement    Psychotherapy:  Target symptoms: depression, anxiety   Why chosen therapy is appropriate versus another modality: relevant to diagnosis  Outcome monitoring methods: self-report  Therapeutic intervention type: supportive psychotherapy  Topics discussed/themes: building skills sets for symptom management, symptom recognition  The patient's response to the intervention is accepting. The patient's progress toward treatment goals is fair.   Duration of intervention: 16 minutes      Medical ROS  Review of Systems   Constitutional:  Negative for chills and fever.   HENT:  Negative for hearing loss.    Eyes:  Negative for blurred vision and double vision.   Respiratory:  Negative for shortness of breath.    Cardiovascular:  Negative for chest pain and palpitations.   Gastrointestinal:  Negative for constipation, diarrhea, nausea and vomiting.   Genitourinary:  Negative for dysuria.   Musculoskeletal:  Negative for back pain and neck pain.   Skin:  Negative for rash.   Neurological:  Negative for dizziness and headaches.   Endo/Heme/Allergies:  Negative for environmental allergies.         PAST MEDICAL HISTORY   Past Medical History:   Diagnosis Date    Abnormal Pap smear of cervix     MITZI 2    Anemia     Anxiety     Depression     Depression 11/05/2021    GBS (group B Streptococcus carrier), +RV culture, currently pregnant  "10/11/2022    RX in labor    Headache(784.0)     Hemorrhoids     History of psychiatric hospitalization     [facility] 11/3/2024 to 11/21/2024; Current: [facility] 11/26/2024    Hx of psychiatric care     Zoloft 25mg (remote history); Buspar 10mg (10/17/2024); Zoloft 100mg (11/22/2024)    Inflammatory bowel disease     Postpartum depression     Psychiatric problem     Depression; Anxiety; Suicidal ideations; Homicidal ideations; anhedonia; sleep disturbance; "nightmares".           PSYCHOTROPIC MEDICATIONS   Scheduled Meds:   sertraline  100 mg Oral Daily     Continuous Infusions:  PRN Meds:.  Current Facility-Administered Medications:     acetaminophen, 650 mg, Oral, Q6H PRN    hydrOXYzine pamoate, 50 mg, Oral, Q6H PRN    melatonin, 6 mg, Oral, Nightly PRN    nicotine, 1 patch, Transdermal, Daily PRN    OLANZapine, 10 mg, Oral, Q8H PRN **AND** OLANZapine, 10 mg, Intramuscular, Q8H PRN        EXAMINATION    VITALS   Vitals:    11/27/24 0715 11/27/24 0813 11/27/24 1908 11/28/24 0728   BP: (!) 101/57  126/69 (!) 106/56   BP Location:   Right arm Left arm   Patient Position:   Sitting Lying   Pulse: 67  98 76   Resp: 18  18 20   Temp: 98.1 °F (36.7 °C)  98.1 °F (36.7 °C) 97.5 °F (36.4 °C)   TempSrc:   Temporal Tympanic   SpO2: 98%  97% 99%   Weight:  50.6 kg (111 lb 8.8 oz)     Height:           Body mass index is 21.08 kg/m².        CONSTITUTIONAL  General Appearance: unremarkable, age appropriate    MUSCULOSKELETAL  Muscle Strength and Tone:no tremor, no tic  Abnormal Involuntary Movements: No  Gait and Station: non-ataxic    PSYCHIATRIC   Level of Consciousness: awake and alert   Orientation: person, place, and situation  Grooming: Casually dressed and Well groomed  Psychomotor Behavior: normal, cooperative  Speech: normal tone, normal rate, normal pitch, normal volume  Language: grossly intact  Mood: anxious  Affect: Consistent with mood  Thought Process: linear, logical  Associations: intact   Thought " Content: less HI, denies SI, and no delusions  Perceptions: denies AH and denies  VH  Memory: Able to recall past events, Remote intact, and Recent intact  Attention:Attends to interview without distraction  Fund of Knowledge: Aware of current events and Vocabulary appropriate   Estimate if Intelligence:  Average based on work/education history, vocabulary and mental status exam  Insight: has awareness of illness  Judgment: behavior is adequate to circumstances        DIAGNOSTIC TESTING   Laboratory Results  No results found for this or any previous visit (from the past 24 hours).        MEDICAL DECISION MAKING          ASSESSMENT         MDD, recurrent, severe without psychotic features  HI  CATALINA  Panic disorder  Psychosocial stressors  Cannabis abuse     Pregnancy           PROBLEM LIST AND MANAGEMENT PLANS          MDD  - continue zoloft 100 mg PO qd  - hold latuda for now due to c/o paresthesia/stiffness  - pt counseled  - follow up with outpatient mental health providers after discharge for medication management and psychotherapy     CATALINA  - continue zoloft 100 mg PO qd  - pt counseled  - follow up with outpatient mental health providers after discharge for medication management and psychotherapy     Homicidal ideations  - continue psychiatric hospitalization  - provide psychotherapeutic interventions and medication management     Panic disorder  - continue zoloft 100 mg PO qd  - pt counseled  - follow up with outpatient mental health providers after discharge for medication management and psychotherapy     Psychosocial stressors  - pt counseled  - SW consulted for assistance with additional resources      Cannabis abuse  - pt counseled  - follow up with outpatient mental health providers after discharge for medication management and psychotherapy        Pregnancy  -OBGYN consult if needed            Discussed diagnosis, risks and benefits of proposed treatment vs alternative treatments vs no treatment, potential  side effects of these treatments and the inherent unpredictability of treatment. The patient expresses understanding of the above and displays the capacity to agree with this treatment given said understanding. Patient also agrees that, currently, the benefits outweigh the risks and would like to pursue/continue treatment at this time.    Any medications being used off-label were discussed with the patient inclusive of the evidence base for the use of the medications and consent was obtained for the off-label use of the medication.       DISCHARGE PLANNING  Expected Disposition Plan: Home or Self Care- pt may be a candidate for dishcarge in 1-2 days       NEED FOR CONTINUED HOSPITALIZATION  Psychiatric illness continues to pose a potential threat to life or bodily function, of self or others, thereby requiring the need for continued inpatient psychiatric hospitalization: Yes, due to: danger to others, as evidenced by:  Concerns with HI--Fading.    Protective inpatient pyschiatric hospitalization required while a safe disposition plan is enacted: Yes    Patient stabilized and ready for discharge from inpatient psychiatric unit: No        STAFF:   Hoang Brown MD  Psychiatry

## 2024-11-28 NOTE — PLAN OF CARE
Patient calm and cooperative. Isolates in room. Denies HI at this time. Mood improved. Med compliant. Appetite good. Patient rates depression 5/10 on scale 1-10 with 10 highest level of depression and rates anxiety 5/10 on same scale. Patient denies SI. No self harming behavior displayed, no aggressive behavior displayed towards others. Patient contracted safety with staff and unit. Discussed healthy coping skills and techniques. Educated, reviewed and discussed plan of care and medication regiment with this patient 1:1. Patient voiced understanding of all teachings.

## 2024-11-28 NOTE — PLAN OF CARE
Denies suicidal thoughts at this time.  Interacting well with staff and peers.  Accepted hs snack.  Mood improving.  At pt's request, given prn hydroxyzine and melatonin for anxiety and insomnia at 20:29.  Stressed compliance with prescribed medications upon discharge.

## 2024-11-29 PROCEDURE — 90833 PSYTX W PT W E/M 30 MIN: CPT | Mod: AF,HB,, | Performed by: STUDENT IN AN ORGANIZED HEALTH CARE EDUCATION/TRAINING PROGRAM

## 2024-11-29 PROCEDURE — 11400000 HC PSYCH PRIVATE ROOM

## 2024-11-29 PROCEDURE — 99232 SBSQ HOSP IP/OBS MODERATE 35: CPT | Mod: AF,HB,, | Performed by: STUDENT IN AN ORGANIZED HEALTH CARE EDUCATION/TRAINING PROGRAM

## 2024-11-29 PROCEDURE — 25000003 PHARM REV CODE 250: Performed by: PSYCHIATRY & NEUROLOGY

## 2024-11-29 RX ADMIN — Medication 6 MG: at 08:11

## 2024-11-29 RX ADMIN — SERTRALINE HYDROCHLORIDE 100 MG: 100 TABLET ORAL at 09:11

## 2024-11-29 RX ADMIN — HYDROXYZINE PAMOATE 50 MG: 50 CAPSULE ORAL at 08:11

## 2024-11-29 NOTE — NURSING
"Point of care reviewed and continued on day of care. Patient ate breakfast and spent rest of time in own room reading a book, not socializing with other people on the unit. Patient willing to take medication prescribed by the provider. Patient denies SI/HI/AH/VH. When asked if she wants to hurt anyone, patient stated "nope." Patient reports no feelings of anxiety or depression. Patient expresses desire to be discharged to eat leftovers from [facility]. Will continue to monitor patient while on unit.  "

## 2024-11-29 NOTE — PSYCH
[facility] discussed with mother Jennifer Montana that she will need to  contact ex-boyfriend for a duty to warn the ex-boyfriend since she did state she wanted to harm him and that she will be discharged on Monday. Mother  gave [facility] the number of Jose Roberto Orellana [phone] and [phone] to discuss the duty to warn. [facility] contacted ex-boyfriend to discuss pt will be released on Monday. [facility] called [phone] which this number has been disconnected and is no longer in service. [facility] also attempted to call [phone] which Jose Roberto did not answer. [facility] was unable to leave voice message due to it is not set up at this time. [facility] will attempt at a later time and date.

## 2024-11-29 NOTE — PLAN OF CARE
"Denies suicidal/homicidal thoughts at this time.  Calm and cooperative.  Said she's looking forward to discharge to "Go eat Thanksgiving leftovers".  Smiling.  Interacting well.  Steady gait.  Stressed compliance with prescribed medications upon discharge.    "

## 2024-11-29 NOTE — PLAN OF CARE
Care plan reviewed with patient. Patient expressed desire to attain care plan goals.    Problem: Adult Behavioral Health Plan of Care  Goal: Plan of Care Review  Outcome: Progressing  Goal: Patient-Specific Goal (Individualization)  Outcome: Progressing  Goal: Adheres to Safety Considerations for Self and Others  Outcome: Progressing  Goal: Absence of New-Onset Illness or Injury  Outcome: Progressing  Goal: Optimized Coping Skills in Response to Life Stressors  Outcome: Progressing  Goal: Develops/Participates in Therapeutic [place] to Support Successful Transition  Outcome: Progressing  Goal: Rounds/Family Conference  Outcome: Progressing     Problem:  Fall Injury Risk  Goal: Absence of Fall, Infant Drop and Related Injury  Outcome: Progressing     Problem: Suicide Risk  Goal: Absence of Self-Harm  Outcome: Progressing     Problem: Suicidal Behavior  Goal: Suicidal Behavior is Absent or Managed  Outcome: Progressing     Problem: Depressive Signs/Symptoms  Goal: Optimized Cognitive Function (Depressive Signs/Symptoms)  Outcome: Progressing  Goal: Improved Mood Symptoms (Depressive Signs/Symptoms)  Outcome: Progressing  Goal: Enhanced Social, Occupational or Functional Skills (Depressive Signs/Symptoms)  Outcome: Progressing

## 2024-11-29 NOTE — PLAN OF CARE
"  Problem: Adult Behavioral Health Plan of Care  Goal: Rounds/Family Conference  Outcome: Progressing  Flowsheets (Taken 11/29/2024 1035)  Participants:   psychiatrist   nursing   other ([facility])   TREATMENT TEAM      Chief Complaint:    Pt is a 29 year old female with chief complaints of HI    Pt states she came in due to she was asked if she wanted to hurt anyone and she said yes. Pt states it was misunderstood. Pt states she was angry because he would not let her see her children.    Pt Goal(s):    Pt states to go home to see her children.     Current Progress:   Pt attended treatment team dressed in personal clothing.     Pt affect consistent with mood/anxious mood    Pt denies side effects to medications.  Pt states she has no way to act on HI due to she has no car and does not know where they are.  Pt states she does not have that thought of harming the person because she got put into the hospital.   Pt states it was a loaded question and the counselor was asking her things and does not want to go back to that therapist at that facility.  Pt states thinks this is a waste of her time being in here in the hospital and she missed Thanksgiving.   Pt states one of the medications she was given she felt like the side of her face was paralyzed.  She is also able cite consequences of acting on HI, Pt stated "The  would be after me, as reason not to act on HI, "that wouldn't help" regarding the situation with her children.       Program/groups:  Encourage pt to continue to attend all groups. Pt  met with [facility] for individual group. Pt soft spoken and cooperative.         Revisions to Plan:  Encourage pt to attend all groups. Recommendations are for pt to attend medication management and psychotherapy.   "

## 2024-11-29 NOTE — NURSING
Pt sleeping at this time, slept 8.5 hrs with no awakenings. NAD. Resp even and unlabored.Pathways clear,bed in low position. Q 15 min safety check ongoing.All precautions maintained.

## 2024-11-29 NOTE — PLAN OF CARE
Problem: Adult Behavioral Health Plan of Care  Goal: Optimized Coping Skills in Response to Life Stressors  Outcome: Progressing  Intervention: Promote Effective Coping Strategies  Flowsheets (Taken 11/29/2024 1023)  Supportive Measures:   active listening utilized   counseling provided   self-care encouraged   verbalization of feelings encouraged    Process Group        Behavior:  [facility] met with pt individually. Pt presents calm and  tearful mood.      Intervention:     38 ways to relieve Stress in less than 10 minutes was discussed in process group  with emphasis on helping patients identify their coping skills. These coping skills are helpful to patients who are attempting to make a change in their situation.  Discussion was held about patients coping skills and what it means to them to change the situation and the importance of finding positive coping skills to control the situation they are in.   Response:     Pt states she likes listening to music and being around her family to change her behavior if she is in a situation.      Plan:   To continue to encourage patient to attend process group and to learn about ways that they may identify coping skills to have a positive change in their situation.

## 2024-11-29 NOTE — PSYCH
[facility] discussed with pt on collateral consent. Pt signed collateral consent for Jennifer Montana/mother [phone]. [facility] attemtped to contact collateral consent to discuss pt admission. Mother stated:        Reason for admission- describe what happened?    She went to a follow up appointment after being discharged from [facility].They asked her a series of questions and they were not taking any of the answers she would give them and that is when they decided to put her back into the hospital. We were doing everything the doctor wanted and that they required her to do and they did not give her any time for her to take her medications or for the medications to work. She was asked some questions at her appointment and they just turned it around.  I am not to happy about what is going on and they are holding her against her will. She is missing her kids and birthday parties and everything and I think that it is unfair that she has to stay in the hospital after her 72 hours is up. The HI is not true and the ex-boyfriend and her were together a few days before and she just said that out of anger. She does not want to hurt him. He does have another girlfriend, but my daughter and him spend time with the children and we get them on the weekends. She did work out something with him and sometimes he did not give her the children, but that was way before and nothing of that was going on with her at the time of her admission. He does give the children to her on the weekends. She was just trying to explain it to the counselor which the counselor took everything out of context.      Prior treatment places and dates-doctor name and location  Reason for prior treatment- same or different  How long has patient had problem(s)?    She was at [facility] last week.This is the first time that this happened. She was overwhelmed  with the situation and they wanted to send her to anger management classes and journaling. Being pregnant,  not being able to hold anything down, stress from her ex. When she is under stress sometimes she says things she should not say, but she will never hurt anyone.     Substance abuse- what , how long, how much, how often?    She was doing the marijuana so she could keep food down and she lost so much weight and she just did that to keep something down in her stomach.    Legal Issues- Current charges, type of offense, probation or parole?    Not to my knowledge.     History of suicide attempts- when and what methods, did they require medical attention?  She never attempted suicide. She only would talk about it, but she never attempted it.     Alcohol Use-  What preference of alcohol, how much, how long, how often?     She would drink occasionally, but not often.    History of violence-describe behaviors and triggers   She is not violent with anyone.    Any guns or weapons in the home? If yes, recommend that these be secured.   Guns are secured  and locked up. I will make sure that all sharp objects are secured upon her discharge.    What is patients baseline behavior/mood- how well do they know if patient is doing well?   She is happy and talking, cooking, and she is just doing a lot of things. She is just joking around    What helps the patient stay well?  Internal/external coping strategies ( attending meetings, going to groups, taking medications, spending time with family ( etc)?    Being with her family and joking around.    Discharge plan:    Where will the patient live upon discharge?   She will come and live with us.    Who else in the home?  Just myself, her father, and her 2 children    Will someone be able to pick the patient up when discharged?   She will need healthcare transportation.

## 2024-11-29 NOTE — PROGRESS NOTES
"PSYCHIATRY DAILY INPATIENT PROGRESS NOTE  SUBSEQUENT HOSPITAL VISIT    ENCOUNTER DATE: 11/29/2024  SITE: [facility]    DATE OF ADMISSION: 11/26/2024  3:58 AM  LENGTH OF STAY: 3 days      CHIEF COMPLAINT   Gayla Montana is a 29 y.o. female, seen during daily salcido rounds on the inpatient unit.  Gayla Montana presented with the chief complaint of  HI      The patient was seen and examined. The chart was reviewed.     Reviewed notes from Rns and labs from the last 24 hours.    The patient's case was discussed with the treatment team/care providers today including Rns    Staff reports no behavioral or management issues.     The patient has been compliant with treatment.      Subjective 11/29/2024       Today the patient reports that she does not feel hospitalization is helping. She states she would like a different outpatient therapist because she feels her therapist asked her a "loaded question," however pt does admit she was having HI. When asked if she would act on the thoughts, pt states "I couldn't because I don't know where they are and I don't have a car." She is also able cite consequences of acting on HI, "the  would be after me," as reason not to act on HI, "that wouldn't help" regarding the situation with her children.    The patient denies any side effects to medications.    At this time symptoms remains severe, functionally and behaviorally impairing and pt remains in need of continued acute inpatient hospitalization for both safety and stabilization.         Per RN:   Calm and cooperative.  Said she's looking forward to discharge to "Go eat Thanksgiving leftovers".         Psychiatric ROS (observed, reported, or endorsed/denied):  Depressed mood - decreasing steadily  Interest/pleasure/anhedonia: decreasing steadily  Guilt/hopelessness/worthlessness - decreasing steadily  Changes in Sleep - decreasing steadily  Changes in Appetite - decreasing steadily  Changes in Concentration " - decreasing steadily  Changes in Energy - decreasing steadily  PMA/R- decreasing steadily  Suicidal- active/passive ideations - denies  Homicidal ideations: active/passive ideations - denies    Hallucinations - No  Delusions - No  Disorganized behavior - No  Disorganized speech - No  Negative symptoms - No    Elevated mood - No  Decreased need for sleep - No  Grandiosity - No  Racing thoughts - No  Impulsivity - No  Irritability- No  Increased energy - No  Distractibility - No  Increase in goal-directed activity or PMA- No    Symptoms of CATALINA - decreasing steadily  Symptoms of Panic Disorder- decreasing steadily  Symptoms of PTSD - No        Overall progress: Patient is showing moderate improvement        Psychotherapy:  Target symptoms: depression, anxiety   Why chosen therapy is appropriate versus another modality: relevant to diagnosis  Outcome monitoring methods: self-report  Therapeutic intervention type: supportive psychotherapy  Topics discussed/themes: building skills sets for symptom management, symptom recognition  The patient's response to the intervention is accepting. The patient's progress toward treatment goals is fair.   Duration of intervention: 16 minutes      Medical ROS  Review of Systems   Constitutional:  Negative for chills and fever.   HENT:  Negative for hearing loss.    Eyes:  Negative for blurred vision and double vision.   Respiratory:  Negative for shortness of breath.    Cardiovascular:  Negative for chest pain and palpitations.   Gastrointestinal:  Negative for constipation, diarrhea, nausea and vomiting.   Genitourinary:  Negative for dysuria.   Musculoskeletal:  Negative for back pain and neck pain.   Skin:  Negative for rash.   Neurological:  Negative for dizziness and headaches.   Endo/Heme/Allergies:  Negative for environmental allergies.         PAST MEDICAL HISTORY   Past Medical History:   Diagnosis Date    Abnormal Pap smear of cervix     MITZI 2    Anemia     Anxiety      "Depression     Depression 11/05/2021    GBS (group B Streptococcus carrier), +RV culture, currently pregnant 10/11/2022    RX in labor    Headache(784.0)     Hemorrhoids     History of psychiatric hospitalization     [facility] 11/3/2024 to 11/21/2024; Current: [facility] 11/26/2024    Hx of psychiatric care     Zoloft 25mg (remote history); Buspar 10mg (10/17/2024); Zoloft 100mg (11/22/2024)    Inflammatory bowel disease     Postpartum depression     Psychiatric problem     Depression; Anxiety; Suicidal ideations; Homicidal ideations; anhedonia; sleep disturbance; "nightmares".           PSYCHOTROPIC MEDICATIONS   Scheduled Meds:   sertraline  100 mg Oral Daily     Continuous Infusions:  PRN Meds:.  Current Facility-Administered Medications:     acetaminophen, 650 mg, Oral, Q6H PRN    hydrOXYzine pamoate, 50 mg, Oral, Q6H PRN    melatonin, 6 mg, Oral, Nightly PRN    nicotine, 1 patch, Transdermal, Daily PRN    OLANZapine, 10 mg, Oral, Q8H PRN **AND** OLANZapine, 10 mg, Intramuscular, Q8H PRN        EXAMINATION    VITALS   Vitals:    11/27/24 1908 11/28/24 0728 11/28/24 1917 11/29/24 0727   BP: 126/69 (!) 106/56 96/66 (!) 97/58   BP Location: Right arm Left arm Right arm Left arm   Patient Position: Sitting Lying Lying Lying   Pulse: 98 76 85 87   Resp: 18 20 18 14   Temp: 98.1 °F (36.7 °C) 97.5 °F (36.4 °C) 98.1 °F (36.7 °C) 97.6 °F (36.4 °C)   TempSrc: Temporal Tympanic Temporal Tympanic   SpO2: 97% 99% 98% 100%   Weight:       Height:           Body mass index is 21.08 kg/m².        CONSTITUTIONAL  General Appearance: unremarkable, age appropriate    MUSCULOSKELETAL  Muscle Strength and Tone:no tremor, no tic  Abnormal Involuntary Movements: No  Gait and Station: non-ataxic    PSYCHIATRIC   Level of Consciousness: awake and alert   Orientation: person, place, and situation  Grooming: Casually dressed and Well groomed  Psychomotor Behavior: normal, cooperative  Speech: normal tone, normal rate, normal " pitch, normal volume  Language: grossly intact  Mood: anxious  Affect: Consistent with mood  Thought Process: linear, logical  Associations: intact   Thought Content: less HI, denies SI, and no delusions  Perceptions: denies AH and denies  VH  Memory: Able to recall past events, Remote intact, and Recent intact  Attention:Attends to interview without distraction  Fund of Knowledge: Aware of current events and Vocabulary appropriate   Estimate if Intelligence:  Average based on work/education history, vocabulary and mental status exam  Insight: has awareness of illness  Judgment: behavior is adequate to circumstances        DIAGNOSTIC TESTING   Laboratory Results  No results found for this or any previous visit (from the past 24 hours).        MEDICAL DECISION MAKING          ASSESSMENT         MDD, recurrent, severe without psychotic features  HI  CATALINA  Panic disorder  Psychosocial stressors  Cannabis abuse     Pregnancy           PROBLEM LIST AND MANAGEMENT PLANS          MDD  - continue zoloft 100 mg PO qd  - hold latuda for now due to c/o paresthesia/stiffness  - pt counseled  - follow up with outpatient mental health providers after discharge for medication management and psychotherapy     CATALINA  - continue zoloft 100 mg PO qd  - pt counseled  - follow up with outpatient mental health providers after discharge for medication management and psychotherapy     Homicidal ideations  - continue psychiatric hospitalization  - provide psychotherapeutic interventions and medication management     Panic disorder  - continue zoloft 100 mg PO qd  - pt counseled  - follow up with outpatient mental health providers after discharge for medication management and psychotherapy     Psychosocial stressors  - pt counseled  - SW consulted for assistance with additional resources      Cannabis abuse  - pt counseled  - follow up with outpatient mental health providers after discharge for medication management and psychotherapy         Pregnancy  -OBGYN consult if needed            Discussed diagnosis, risks and benefits of proposed treatment vs alternative treatments vs no treatment, potential side effects of these treatments and the inherent unpredictability of treatment. The patient expresses understanding of the above and displays the capacity to agree with this treatment given said understanding. Patient also agrees that, currently, the benefits outweigh the risks and would like to pursue/continue treatment at this time.    Any medications being used off-label were discussed with the patient inclusive of the evidence base for the use of the medications and consent was obtained for the off-label use of the medication.       DISCHARGE PLANNING  Expected Disposition Plan: Home or Self Care- pt may be a candidate for dishcarge in 1-2 days       NEED FOR CONTINUED HOSPITALIZATION  Psychiatric illness continues to pose a potential threat to life or bodily function, of self or others, thereby requiring the need for continued inpatient psychiatric hospitalization: Yes, due to: danger to others, as evidenced by:  Concerns with HI--Fading.    Protective inpatient pyschiatric hospitalization required while a safe disposition plan is enacted: Yes    Patient stabilized and ready for discharge from inpatient psychiatric unit: No        STAFF:   Gab Plascencia III, MD  Psychiatry

## 2024-11-30 PROCEDURE — 25000003 PHARM REV CODE 250: Performed by: PSYCHIATRY & NEUROLOGY

## 2024-11-30 PROCEDURE — 99232 SBSQ HOSP IP/OBS MODERATE 35: CPT | Mod: AF,HB,, | Performed by: STUDENT IN AN ORGANIZED HEALTH CARE EDUCATION/TRAINING PROGRAM

## 2024-11-30 PROCEDURE — 11400000 HC PSYCH PRIVATE ROOM

## 2024-11-30 RX ADMIN — HYDROXYZINE PAMOATE 50 MG: 50 CAPSULE ORAL at 08:11

## 2024-11-30 RX ADMIN — Medication 6 MG: at 08:11

## 2024-11-30 RX ADMIN — SERTRALINE HYDROCHLORIDE 100 MG: 100 TABLET ORAL at 08:11

## 2024-11-30 RX ADMIN — ACETAMINOPHEN 650 MG: 325 TABLET ORAL at 05:11

## 2024-11-30 NOTE — PLAN OF CARE
Patient calm and cooperative. Appetite adequate. Denies SI/HI at present. Isolating to room. Avoiding social interactions. States she did not sleep last night due to her throat hurting. States her throat has been hurting for 3 days and she is now congested.

## 2024-11-30 NOTE — NURSING
POC reviewed this shift and is ongoing.  Pt is cooperative with care with no medications due this shift.  Pt requested and was given PRN vistaril and melatonin for sleep.  Pt spent most of the shift isolated in her room and had very little interaction with staff or peers.

## 2024-11-30 NOTE — PROGRESS NOTES
"PSYCHIATRY DAILY INPATIENT PROGRESS NOTE  SUBSEQUENT HOSPITAL VISIT    ENCOUNTER DATE: 11/30/2024  S[facility]    DATE OF ADMISSION: 11/26/2024  3:58 AM  LENGTH OF STAY: 4 days      CHIEF COMPLAINT   Gayla Montana is a 29 y.o. female, seen during daily salcido rounds on the inpatient unit.  Gayla Montana presented with the chief complaint of  HI      The patient was seen and examined. The chart was reviewed.     Reviewed notes from Rns and labs from the last 24 hours.    The patient's case was discussed with the treatment team/care providers today including Rns    Staff reports no behavioral or management issues.     The patient has been compliant with treatment.      Subjective 11/30/2024       Today the patient reports continuing depression, "I am crying at night and having nightmares." Pt tearful, "it's about my ex."    Pt now starting to minimize HI and somewhat inconsistent with her previous reports.    The patient denies any side effects to medications.    At this time symptoms remains severe, functionally and behaviorally impairing and pt remains in need of continued acute inpatient hospitalization for both safety and stabilization.        Per RN:  POC reviewed this shift and is ongoing.  Pt is cooperative with care with no medications due this shift.  Pt requested and was given PRN vistaril and melatonin for sleep.  Pt spent most of the shift isolated in her room and had very little interaction with staff or peers.         Psychiatric ROS (observed, reported, or endorsed/denied):  Depressed mood - Continuing  Interest/pleasure/anhedonia: decreasing steadily  Guilt/hopelessness/worthlessness - decreasing steadily  Changes in Sleep - decreasing steadily  Changes in Appetite - decreasing steadily  Changes in Concentration - decreasing steadily  Changes in Energy - decreasing steadily  PMA/R- decreasing steadily  Suicidal- active/passive ideations - denies  Homicidal ideations: " "active/passive ideations - denies    Hallucinations - No  Delusions - No  Disorganized behavior - No  Disorganized speech - No  Negative symptoms - No    Elevated mood - No  Decreased need for sleep - No  Grandiosity - No  Racing thoughts - No  Impulsivity - No  Irritability- No  Increased energy - No  Distractibility - No  Increase in goal-directed activity or PMA- No    Symptoms of CATALINA - decreasing steadily  Symptoms of Panic Disorder- decreasing steadily  Symptoms of PTSD - No        Overall progress: Patient is showing moderate improvement              Medical ROS  Review of Systems   Constitutional:  Negative for chills and fever.   HENT:  Positive for sore throat. Negative for hearing loss.    Eyes:  Negative for blurred vision and double vision.   Respiratory:  Negative for shortness of breath.    Cardiovascular:  Negative for chest pain and palpitations.   Gastrointestinal:  Negative for constipation, diarrhea, nausea and vomiting.   Genitourinary:  Negative for dysuria.   Musculoskeletal:  Negative for back pain and neck pain.   Skin:  Negative for rash.   Neurological:  Negative for dizziness and headaches.   Endo/Heme/Allergies:  Negative for environmental allergies.         PAST MEDICAL HISTORY   Past Medical History:   Diagnosis Date    Abnormal Pap smear of cervix     MITZI 2    Anemia     Anxiety     Depression     Depression 11/05/2021    GBS (group B Streptococcus carrier), +RV culture, currently pregnant 10/11/2022    RX in labor    Headache(784.0)     Hemorrhoids     History of psychiatric hospitalization     [facility] 11/3/2024 to 11/21/2024; Current: [facility] 11/26/2024    Hx of psychiatric care     Zoloft 25mg (remote history); Buspar 10mg (10/17/2024); Zoloft 100mg (11/22/2024)    Inflammatory bowel disease     Postpartum depression     Psychiatric problem     Depression; Anxiety; Suicidal ideations; Homicidal ideations; anhedonia; sleep disturbance; "nightmares". "           PSYCHOTROPIC MEDICATIONS   Scheduled Meds:   sertraline  100 mg Oral Daily     Continuous Infusions:  PRN Meds:.  Current Facility-Administered Medications:     acetaminophen, 650 mg, Oral, Q6H PRN    hydrOXYzine pamoate, 50 mg, Oral, Q6H PRN    melatonin, 6 mg, Oral, Nightly PRN    nicotine, 1 patch, Transdermal, Daily PRN    OLANZapine, 10 mg, Oral, Q8H PRN **AND** OLANZapine, 10 mg, Intramuscular, Q8H PRN        EXAMINATION    VITALS   Vitals:    11/28/24 1917 11/29/24 0727 11/29/24 1917 11/30/24 0722   BP: 96/66 (!) 97/58 115/64 (!) 102/59   BP Location: Right arm Left arm Left arm Left arm   Patient Position: Lying Lying Sitting Lying   Pulse: 85 87 86 71   Resp: 18 14 18 18   Temp: 98.1 °F (36.7 °C) 97.6 °F (36.4 °C) 98.9 °F (37.2 °C) 98.8 °F (37.1 °C)   TempSrc: Temporal Tympanic Temporal Tympanic   SpO2: 98% 100% 100% 99%   Weight:       Height:           Body mass index is 21.08 kg/m².        CONSTITUTIONAL  General Appearance: unremarkable, age appropriate    MUSCULOSKELETAL  Muscle Strength and Tone:no tremor, no tic  Abnormal Involuntary Movements: No  Gait and Station: non-ataxic    PSYCHIATRIC   Level of Consciousness: awake and alert   Orientation: person, place, and situation  Grooming: Casually dressed and Well groomed  Psychomotor Behavior: normal, cooperative  Speech: normal tone, normal rate, normal pitch, normal volume  Language: grossly intact  Mood: depressed  Affect: Consistent with mood  Thought Process: linear, logical  Associations: intact   Thought Content: denies SI, denies HI, and no delusions  Perceptions: denies AH and denies  VH  Memory: Able to recall past events, Remote intact, and Recent intact  Attention:Attends to interview without distraction  Fund of Knowledge: Aware of current events and Vocabulary appropriate   Estimate if Intelligence:  Average based on work/education history, vocabulary and mental status exam  Insight: has awareness of illness  Judgment: behavior  is adequate to circumstances        DIAGNOSTIC TESTING   Laboratory Results  No results found for this or any previous visit (from the past 24 hours).        MEDICAL DECISION MAKING          ASSESSMENT         MDD, recurrent, severe without psychotic features  HI  CATALINA  Panic disorder  Psychosocial stressors  Cannabis abuse     Pregnancy           PROBLEM LIST AND MANAGEMENT PLANS          MDD  - continue zoloft 100 mg PO qd  - hold latuda for now due to c/o paresthesia/stiffness  - pt counseled  - follow up with outpatient mental health providers after discharge for medication management and psychotherapy     CATALINA  - continue zoloft 100 mg PO qd  - pt counseled  - follow up with outpatient mental health providers after discharge for medication management and psychotherapy     Homicidal ideations  - continue psychiatric hospitalization  - provide psychotherapeutic interventions and medication management     Panic disorder  - continue zoloft 100 mg PO qd  - pt counseled  - follow up with outpatient mental health providers after discharge for medication management and psychotherapy     Psychosocial stressors  - pt counseled  - SW consulted for assistance with additional resources      Cannabis abuse  - pt counseled  - follow up with outpatient mental health providers after discharge for medication management and psychotherapy        Pregnancy  -OBGYN consult if needed            Discussed diagnosis, risks and benefits of proposed treatment vs alternative treatments vs no treatment, potential side effects of these treatments and the inherent unpredictability of treatment. The patient expresses understanding of the above and displays the capacity to agree with this treatment given said understanding. Patient also agrees that, currently, the benefits outweigh the risks and would like to pursue/continue treatment at this time.    Any medications being used off-label were discussed with the patient inclusive of the evidence  base for the use of the medications and consent was obtained for the off-label use of the medication.       DISCHARGE PLANNING  Expected Disposition Plan: Home or Self Care- pt may be a candidate for dishcarge in 1-2 days       NEED FOR CONTINUED HOSPITALIZATION  Psychiatric illness continues to pose a potential threat to life or bodily function, of self or others, thereby requiring the need for continued inpatient psychiatric hospitalization: Yes, due to: danger to others, as evidenced by:  Concerns with HI--Fading.    Protective inpatient pyschiatric hospitalization required while a safe disposition plan is enacted: Yes    Patient stabilized and ready for discharge from inpatient psychiatric unit: No        STAFF:   Gab Plascencia III, MD  Psychiatry

## 2024-12-01 PROBLEM — R45.850 HOMICIDAL IDEATIONS: Status: ACTIVE | Noted: 2024-12-01

## 2024-12-01 PROBLEM — J02.9 ACUTE PHARYNGITIS: Status: ACTIVE | Noted: 2024-12-01

## 2024-12-01 LAB — GROUP A STREP, MOLECULAR: NEGATIVE

## 2024-12-01 PROCEDURE — 87651 STREP A DNA AMP PROBE: CPT | Performed by: STUDENT IN AN ORGANIZED HEALTH CARE EDUCATION/TRAINING PROGRAM

## 2024-12-01 PROCEDURE — 99232 SBSQ HOSP IP/OBS MODERATE 35: CPT | Mod: AF,HB,, | Performed by: STUDENT IN AN ORGANIZED HEALTH CARE EDUCATION/TRAINING PROGRAM

## 2024-12-01 PROCEDURE — 11400000 HC PSYCH PRIVATE ROOM

## 2024-12-01 PROCEDURE — 99232 SBSQ HOSP IP/OBS MODERATE 35: CPT | Mod: ,,, | Performed by: INTERNAL MEDICINE

## 2024-12-01 PROCEDURE — 25000003 PHARM REV CODE 250: Performed by: PSYCHIATRY & NEUROLOGY

## 2024-12-01 RX ADMIN — HYDROXYZINE PAMOATE 50 MG: 50 CAPSULE ORAL at 07:12

## 2024-12-01 RX ADMIN — Medication 6 MG: at 09:12

## 2024-12-01 RX ADMIN — SERTRALINE HYDROCHLORIDE 100 MG: 100 TABLET ORAL at 08:12

## 2024-12-01 NOTE — HOSPITAL COURSE
Medicine reconsulted as patient c/o sore throat. States started 4 days ago. + congestion. No otalgia. No fevers documented.

## 2024-12-01 NOTE — CONSULTS
"[facility] Hospital Medicine  Consult Note    Patient Name: Gayla Montana  MRN: [id number]  Admission Date: 11/26/2024  Hospital Length of Stay: 5 days  Attending Physician: Gab Plascencia III, MD   Primary Care Provider: [facility] Primary [facility]           Patient information was obtained from patient and ER records.     Inpatient consult to Family Medicine  Consult performed by: Yasmin Sequeira MD  Consult ordered by: Gab Plascencia III, MD        Subjective:     Principal Problem: Depression    Chief Complaint: No chief complaint on file.       HPI: Patient is a 29 year old female with medical history of anxiety and depression who was admitted to [facility] for homicidal ideation.  Patient is currently 14 weeks pregnant.  Hospital medicine consulted for medical management.        Past Medical History:   Diagnosis Date    Abnormal Pap smear of cervix     MITZI 2    Anemia     Anxiety     Depression     Depression 11/05/2021    GBS (group B Streptococcus carrier), +RV culture, currently pregnant 10/11/2022    RX in labor    Headache(784.0)     Hemorrhoids     History of psychiatric hospitalization     [facility] 11/3/2024 to 11/21/2024; Current: [facility] 11/26/2024    Hx of psychiatric care     Zoloft 25mg (remote history); Buspar 10mg (10/17/2024); Zoloft 100mg (11/22/2024)    Inflammatory bowel disease     Postpartum depression     Psychiatric problem     Depression; Anxiety; Suicidal ideations; Homicidal ideations; anhedonia; sleep disturbance; "nightmares".       Past Surgical History:   Procedure Laterality Date    ADENOIDECTOMY      APPENDECTOMY      CERVICAL BIOPSY  W/ LOOP ELECTRODE EXCISION      FOOT SURGERY      TONSILLECTOMY         Review of patient's allergies indicates:   Allergen Reactions    Latex Rash    Latex, natural rubber Swelling and Rash    Tomato Swelling       No current facility-administered medications on file prior to encounter.     Current Outpatient Medications on " File Prior to Encounter   Medication Sig    lurasidone (LATUDA) 20 mg Tab tablet Take 1 tablet (20 mg total) by mouth daily with lunch.    sertraline (ZOLOFT) 100 MG tablet Take 1 tablet (100 mg total) by mouth once daily.    busPIRone (BUSPAR) 10 MG tablet Take 10 mg by mouth 2 (two) times daily as needed.    docusate sodium (COLACE) 100 MG capsule Take 1 capsule (100 mg total) by mouth 2 (two) times daily as needed for Constipation.    ondansetron (ZOFRAN-ODT) 8 MG TbDL Take 1 tablet (8 mg total) by mouth every 6 (six) hours as needed (Nausea).    ondansetron (ZOFRAN-ODT) 8 MG TbDL Take 1 tablet by mouth every 8 (eight) hours as needed.    PNV no.95/ferrous fum/folic ac (PRENATAL ORAL) Take by mouth.    promethazine (PHENERGAN) 25 MG tablet Take 25 mg by mouth every 6 (six) hours as needed.    sertraline (ZOLOFT) 25 MG tablet Take 25 mg by mouth.     Family History       Problem Relation (Age of Onset)    Diabetes Mother, Father    Multiple sclerosis Maternal Aunt    No Known Problems Sister, Brother    Ovarian cancer Mother, Maternal Aunt          Tobacco Use    Smoking status: Never    Smokeless tobacco: Never   Substance and Sexual Activity    Alcohol use: Not Currently     Alcohol/week: 1.0 standard drink of alcohol     Types: 1 Glasses of wine per week     Comment: socially    Drug use: Yes     Types: Marijuana     Comment: Daily    Sexual activity: Not Currently     Partners: Male     Review of Systems   Constitutional:  Negative for activity change, fatigue, fever and unexpected weight change.   HENT:  Positive for congestion and sore throat. Negative for ear pain, hearing loss and rhinorrhea.    Eyes:  Negative for redness and visual disturbance.   Respiratory:  Positive for cough. Negative for shortness of breath and wheezing.    Cardiovascular:  Negative for chest pain, palpitations and leg swelling.   Gastrointestinal:  Negative for abdominal pain, constipation, diarrhea, nausea and vomiting.  "  Genitourinary:  Negative for dysuria and frequency.   Musculoskeletal:  Negative for back pain, joint swelling and neck pain.   Skin:  Negative for color change, rash and wound.   Neurological:  Negative for dizziness, light-headedness and headaches.     Objective:     Vital Signs (Most Recent):  Temp: 97.5 °F (36.4 °C) (12/01/24 0721)  Pulse: 63 (12/01/24 0721)  Resp: 18 (12/01/24 0721)  BP: (!) 96/55 (12/01/24 0721)  SpO2: 99 % (12/01/24 0721) Vital Signs (24h Range):  Temp:  [97.5 °F (36.4 °C)-98.4 °F (36.9 °C)] 97.5 °F (36.4 °C)  Pulse:  [63-86] 63  Resp:  [16-18] 18  SpO2:  [99 %] 99 %  BP: ()/(55-58) 96/55     Weight: 51.2 kg (112 lb 14 oz) (breakfast)  Body mass index is 21.33 kg/m².     Physical Exam  Vitals reviewed.   Constitutional:       General: She is not in acute distress.     Appearance: She is well-developed.   HENT:      Head: Normocephalic and atraumatic.      Right Ear: External ear normal.      Left Ear: External ear normal.      Nose: Nose normal.      Mouth/Throat:      Mouth: Mucous membranes are moist.      Pharynx: No oropharyngeal exudate.   Neck:      Thyroid: No thyromegaly.   Cardiovascular:      Rate and Rhythm: Normal rate and regular rhythm.   Pulmonary:      Effort: Pulmonary effort is normal. No respiratory distress.      Breath sounds: Normal breath sounds.   Skin:     General: Skin is warm and dry.   Neurological:      Mental Status: She is alert and oriented to person, place, and time.          Significant Labs: All pertinent labs within the past 24 hours have been reviewed.  CBC: No results for input(s): "WBC", "HGB", "HCT", "PLT" in the last 48 hours.  CMP: No results for input(s): "NA", "K", "CL", "CO2", "GLU", "BUN", "CREATININE", "CALCIUM", "PROT", "ALBUMIN", "BILITOT", "ALKPHOS", "AST", "ALT", "ANIONGAP", "EGFRNONAA" in the last 48 hours.    Invalid input(s): "ESTGFAFRICA"    Significant Imaging: none  Assessment/Plan:     * Depression  Defer to psych        Acute " pharyngitis  Strep swab ordered  However, exam is normal. No exudate. No erythema. Lungs clear. Afebrile  Ordered PRN chloraseptic spray for sx control for now      14 weeks gestation of pregnancy  Recommend close obgyn f/u and continue prenatal vitamins        VTE Risk Mitigation (From admission, onward)      None                Thank you for your consult. I will sign off. Please contact us if you have any additional questions.    Yasmin Sequeira MD  Department of Hospital Medicine   [facility]

## 2024-12-01 NOTE — PROGRESS NOTES
PSYCHIATRY DAILY INPATIENT PROGRESS NOTE  SUBSEQUENT HOSPITAL VISIT    ENCOUNTER DATE: 12/1/2024  [facility]    DATE OF ADMISSION: 11/26/2024  3:58 AM  LENGTH OF STAY: 5 days      CHIEF COMPLAINT   Gayla Montana is a 29 y.o. female, seen during daily salcido rounds on the inpatient unit.  Gayla Montana presented with the chief complaint of  HI      The patient was seen and examined. The chart was reviewed.     Reviewed notes from Rns and labs from the last 24 hours.    The patient's case was discussed with the treatment team/care providers today including Rns    Staff reports no behavioral or management issues.     The patient has been compliant with treatment.      Subjective 12/01/2024       Today the patient reports continuing depression, tearful at times.    She declines any medication changes.    States she would be willing to attend an IOP.    Discussed plans for discharge tomorrow.    The patient denies any side effects to medications.    At this time symptoms remains severe, functionally and behaviorally impairing and pt remains in need of continued acute inpatient hospitalization for both safety and stabilization.            Psychiatric ROS (observed, reported, or endorsed/denied):  Depressed mood - Continuing  Interest/pleasure/anhedonia: decreasing steadily  Guilt/hopelessness/worthlessness - decreasing steadily  Changes in Sleep - decreasing steadily  Changes in Appetite - decreasing steadily  Changes in Concentration - decreasing steadily  Changes in Energy - decreasing steadily  PMA/R- decreasing steadily  Suicidal- active/passive ideations - denies  Homicidal ideations: active/passive ideations - denies    Hallucinations - No  Delusions - No  Disorganized behavior - No  Disorganized speech - No  Negative symptoms - No    Elevated mood - No  Decreased need for sleep - No  Grandiosity - No  Racing thoughts - No  Impulsivity - No  Irritability- No  Increased energy -  "No  Distractibility - No  Increase in goal-directed activity or PMA- No    Symptoms of CATALINA - fluctuating  Symptoms of Panic Disorder- decreasing steadily  Symptoms of PTSD - No        Overall progress: Patient is showing moderate improvement              Medical ROS  Review of Systems   Constitutional:  Negative for chills and fever.   HENT:  Positive for sore throat. Negative for hearing loss.    Eyes:  Negative for blurred vision and double vision.   Respiratory:  Negative for shortness of breath.    Cardiovascular:  Negative for chest pain and palpitations.   Gastrointestinal:  Negative for constipation, diarrhea, nausea and vomiting.   Genitourinary:  Negative for dysuria.   Musculoskeletal:  Negative for back pain and neck pain.   Skin:  Negative for rash.   Neurological:  Negative for dizziness and headaches.   Endo/Heme/Allergies:  Negative for environmental allergies.         PAST MEDICAL HISTORY   Past Medical History:   Diagnosis Date    Abnormal Pap smear of cervix     MITZI 2    Anemia     Anxiety     Depression     Depression 11/05/2021    GBS (group B Streptococcus carrier), +RV culture, currently pregnant 10/11/2022    RX in labor    Headache(784.0)     Hemorrhoids     History of psychiatric hospitalization     [facility] 11/3/2024 to 11/21/2024; Current: Merged with [facility] 11/26/2024    Hx of psychiatric care     Zoloft 25mg (remote history); Buspar 10mg (10/17/2024); Zoloft 100mg (11/22/2024)    Inflammatory bowel disease     Postpartum depression     Psychiatric problem     Depression; Anxiety; Suicidal ideations; Homicidal ideations; anhedonia; sleep disturbance; "nightmares".           PSYCHOTROPIC MEDICATIONS   Scheduled Meds:   sertraline  100 mg Oral Daily     Continuous Infusions:  PRN Meds:.  Current Facility-Administered Medications:     acetaminophen, 650 mg, Oral, Q6H PRN    hydrOXYzine pamoate, 50 mg, Oral, Q6H PRN    melatonin, 6 mg, Oral, Nightly PRN    nicotine, 1 patch, Transdermal, " Daily PRN    OLANZapine, 10 mg, Oral, Q8H PRN **AND** OLANZapine, 10 mg, Intramuscular, Q8H PRN        EXAMINATION    VITALS   Vitals:    11/30/24 1714 11/30/24 1932 12/01/24 0721 12/01/24 0800   BP:  (!) 102/58 (!) 96/55    BP Location:       Patient Position:       Pulse:  86 63    Resp:  16 18    Temp: 98.1 °F (36.7 °C) 98.4 °F (36.9 °C) 97.5 °F (36.4 °C)    TempSrc:       SpO2:   99%    Weight:    51.2 kg (112 lb 14 oz)   Height:           Body mass index is 21.33 kg/m².        CONSTITUTIONAL  General Appearance: unremarkable, age appropriate    MUSCULOSKELETAL  Muscle Strength and Tone:no tremor, no tic  Abnormal Involuntary Movements: No  Gait and Station: non-ataxic    PSYCHIATRIC   Level of Consciousness: awake and alert   Orientation: person, place, and situation  Grooming: Casually dressed and Well groomed  Psychomotor Behavior: normal, cooperative  Speech: normal tone, normal rate, normal pitch, normal volume  Language: grossly intact  Mood: depressed  Affect: Consistent with mood  Thought Process: linear, logical  Associations: intact   Thought Content: denies SI, denies HI, and no delusions  Perceptions: denies AH and denies  VH  Memory: Able to recall past events, Remote intact, and Recent intact  Attention:Attends to interview without distraction  Fund of Knowledge: Aware of current events and Vocabulary appropriate   Estimate if Intelligence:  Average based on work/education history, vocabulary and mental status exam  Insight: has awareness of illness  Judgment: behavior is adequate to circumstances        DIAGNOSTIC TESTING   Laboratory Results  Recent Results (from the past 24 hours)   Group A Strep, Molecular    Collection Time: 12/01/24 11:32 AM   Result Value Ref Range    Group A Strep, Molecular Negative Negative           MEDICAL DECISION MAKING          ASSESSMENT         MDD, recurrent, severe without psychotic features  HI  CATALINA  Panic disorder  Psychosocial stressors  Cannabis abuse      Pregnancy           PROBLEM LIST AND MANAGEMENT PLANS          MDD  - continue zoloft 100 mg PO qd  - hold latuda for now due to c/o paresthesia/stiffness  - pt counseled  - follow up with outpatient mental health providers after discharge for medication management and psychotherapy     CATALINA  - continue zoloft 100 mg PO qd  - pt counseled  - follow up with outpatient mental health providers after discharge for medication management and psychotherapy     Homicidal ideations  - continue psychiatric hospitalization  - provide psychotherapeutic interventions and medication management     Panic disorder  - continue zoloft 100 mg PO qd  - pt counseled  - follow up with outpatient mental health providers after discharge for medication management and psychotherapy     Psychosocial stressors  - pt counseled  - SW consulted for assistance with additional resources      Cannabis abuse  - pt counseled  - follow up with outpatient mental health providers after discharge for medication management and psychotherapy        Pregnancy  -OBGYN consult if needed            Discussed diagnosis, risks and benefits of proposed treatment vs alternative treatments vs no treatment, potential side effects of these treatments and the inherent unpredictability of treatment. The patient expresses understanding of the above and displays the capacity to agree with this treatment given said understanding. Patient also agrees that, currently, the benefits outweigh the risks and would like to pursue/continue treatment at this time.    Any medications being used off-label were discussed with the patient inclusive of the evidence base for the use of the medications and consent was obtained for the off-label use of the medication.       DISCHARGE PLANNING  Expected Disposition Plan: Home or Self Care- pt may be a candidate for dishcarge in 1-2 days       NEED FOR CONTINUED HOSPITALIZATION  Psychiatric illness continues to pose a potential threat to  life or bodily function, of self or others, thereby requiring the need for continued inpatient psychiatric hospitalization: Yes, due to: danger to others, as evidenced by:  Concerns with HI--Fading.    Protective inpatient pyschiatric hospitalization required while a safe disposition plan is enacted: Yes    Patient stabilized and ready for discharge from inpatient psychiatric unit: No        STAFF:   Gab Plascencia III, MD  Psychiatry

## 2024-12-01 NOTE — ASSESSMENT & PLAN NOTE
Strep swab ordered  However, exam is normal. No exudate. No erythema. Lungs clear. Afebrile  Ordered PRN chloraseptic spray for sx control for now

## 2024-12-01 NOTE — PLAN OF CARE
Patient awake and alert.c/o sore throat 10/10 x4 days now. Throat appears red. Patient denies SI/HI. Denies anxiety and depression. Appears sad and restricted with flat effect. Not much interaction with staff and peers. Isolating in the room throughout the day. Dr. Sequeira seen patient regarding sore throat. New orders received for strep swap and throat spray PRN. Swab completed with negative results and PRN throat spray available. Pt showered today.

## 2024-12-01 NOTE — NURSING
Pt slept approximately 9.5 hours. Resp even and unlabored. Safety checks ongoing and precautions maintained.

## 2024-12-01 NOTE — SUBJECTIVE & OBJECTIVE
"Past Medical History:   Diagnosis Date    Abnormal Pap smear of cervix     MITZI 2    Anemia     Anxiety     Depression     Depression 11/05/2021    GBS (group B Streptococcus carrier), +RV culture, currently pregnant 10/11/2022    RX in labor    Headache(784.0)     Hemorrhoids     History of psychiatric hospitalization     [facility] 11/3/2024 to 11/21/2024; Current: [facility] 11/26/2024    Hx of psychiatric care     Zoloft 25mg (remote history); Buspar 10mg (10/17/2024); Zoloft 100mg (11/22/2024)    Inflammatory bowel disease     Postpartum depression     Psychiatric problem     Depression; Anxiety; Suicidal ideations; Homicidal ideations; anhedonia; sleep disturbance; "nightmares".       Past Surgical History:   Procedure Laterality Date    ADENOIDECTOMY      APPENDECTOMY      CERVICAL BIOPSY  W/ LOOP ELECTRODE EXCISION      FOOT SURGERY      TONSILLECTOMY         Review of patient's allergies indicates:   Allergen Reactions    Latex Rash    Latex, natural rubber Swelling and Rash    Tomato Swelling       No current facility-administered medications on file prior to encounter.     Current Outpatient Medications on File Prior to Encounter   Medication Sig    lurasidone (LATUDA) 20 mg Tab tablet Take 1 tablet (20 mg total) by mouth daily with lunch.    sertraline (ZOLOFT) 100 MG tablet Take 1 tablet (100 mg total) by mouth once daily.    busPIRone (BUSPAR) 10 MG tablet Take 10 mg by mouth 2 (two) times daily as needed.    docusate sodium (COLACE) 100 MG capsule Take 1 capsule (100 mg total) by mouth 2 (two) times daily as needed for Constipation.    ondansetron (ZOFRAN-ODT) 8 MG TbDL Take 1 tablet (8 mg total) by mouth every 6 (six) hours as needed (Nausea).    ondansetron (ZOFRAN-ODT) 8 MG TbDL Take 1 tablet by mouth every 8 (eight) hours as needed.    PNV no.95/ferrous fum/folic ac (PRENATAL ORAL) Take by mouth.    promethazine (PHENERGAN) 25 MG tablet Take 25 mg by mouth every 6 (six) hours as needed.    " sertraline (ZOLOFT) 25 MG tablet Take 25 mg by mouth.     Family History       Problem Relation (Age of Onset)    Diabetes Mother, Father    Multiple sclerosis Maternal Aunt    No Known Problems Sister, Brother    Ovarian cancer Mother, Maternal Aunt          Tobacco Use    Smoking status: Never    Smokeless tobacco: Never   Substance and Sexual Activity    Alcohol use: Not Currently     Alcohol/week: 1.0 standard drink of alcohol     Types: 1 Glasses of wine per week     Comment: socially    Drug use: Yes     Types: Marijuana     Comment: Daily    Sexual activity: Not Currently     Partners: Male     Review of Systems   Constitutional:  Negative for activity change, fatigue, fever and unexpected weight change.   HENT:  Positive for congestion and sore throat. Negative for ear pain, hearing loss and rhinorrhea.    Eyes:  Negative for redness and visual disturbance.   Respiratory:  Positive for cough. Negative for shortness of breath and wheezing.    Cardiovascular:  Negative for chest pain, palpitations and leg swelling.   Gastrointestinal:  Negative for abdominal pain, constipation, diarrhea, nausea and vomiting.   Genitourinary:  Negative for dysuria and frequency.   Musculoskeletal:  Negative for back pain, joint swelling and neck pain.   Skin:  Negative for color change, rash and wound.   Neurological:  Negative for dizziness, light-headedness and headaches.     Objective:     Vital Signs (Most Recent):  Temp: 97.5 °F (36.4 °C) (12/01/24 0721)  Pulse: 63 (12/01/24 0721)  Resp: 18 (12/01/24 0721)  BP: (!) 96/55 (12/01/24 0721)  SpO2: 99 % (12/01/24 0721) Vital Signs (24h Range):  Temp:  [97.5 °F (36.4 °C)-98.4 °F (36.9 °C)] 97.5 °F (36.4 °C)  Pulse:  [63-86] 63  Resp:  [16-18] 18  SpO2:  [99 %] 99 %  BP: ()/(55-58) 96/55     Weight: 51.2 kg (112 lb 14 oz) (breakfast)  Body mass index is 21.33 kg/m².     Physical Exam  Vitals reviewed.   Constitutional:       General: She is not in acute distress.      "Appearance: She is well-developed.   HENT:      Head: Normocephalic and atraumatic.      Right Ear: External ear normal.      Left Ear: External ear normal.      Nose: Nose normal.      Mouth/Throat:      Mouth: Mucous membranes are moist.      Pharynx: No oropharyngeal exudate.   Neck:      Thyroid: No thyromegaly.   Cardiovascular:      Rate and Rhythm: Normal rate and regular rhythm.   Pulmonary:      Effort: Pulmonary effort is normal. No respiratory distress.      Breath sounds: Normal breath sounds.   Skin:     General: Skin is warm and dry.   Neurological:      Mental Status: She is alert and oriented to person, place, and time.          Significant Labs: All pertinent labs within the past 24 hours have been reviewed.  CBC: No results for input(s): "WBC", "HGB", "HCT", "PLT" in the last 48 hours.  CMP: No results for input(s): "NA", "K", "CL", "CO2", "GLU", "BUN", "CREATININE", "CALCIUM", "PROT", "ALBUMIN", "BILITOT", "ALKPHOS", "AST", "ALT", "ANIONGAP", "EGFRNONAA" in the last 48 hours.    Invalid input(s): "ESTGFAFRICA"    Significant Imaging: none  "

## 2024-12-01 NOTE — PLAN OF CARE
Restricted interaction with staff and peers, Pt has c/o sore throat, educated Pt on next available dose of Tylenol, appropriate eye contact, calm/cooperative, verbal contract for safety, plan of care ongoing, accepts snacks without difficulty. Medications reviewed. Encouraged to participate in groups. Monitored Q15 minutes for falls and safety precautions per staff. Will continue to monitor for needs and safety.

## 2024-12-02 VITALS
TEMPERATURE: 98 F | SYSTOLIC BLOOD PRESSURE: 110 MMHG | WEIGHT: 112.88 LBS | RESPIRATION RATE: 18 BRPM | BODY MASS INDEX: 21.31 KG/M2 | HEIGHT: 61 IN | DIASTOLIC BLOOD PRESSURE: 62 MMHG | HEART RATE: 82 BPM | OXYGEN SATURATION: 99 %

## 2024-12-02 PROBLEM — R45.850 HOMICIDAL IDEATIONS: Status: RESOLVED | Noted: 2024-12-01 | Resolved: 2024-12-02

## 2024-12-02 PROCEDURE — 25000003 PHARM REV CODE 250: Performed by: PSYCHIATRY & NEUROLOGY

## 2024-12-02 PROCEDURE — 99239 HOSP IP/OBS DSCHRG MGMT >30: CPT | Mod: AF,HB,, | Performed by: PSYCHIATRY & NEUROLOGY

## 2024-12-02 PROCEDURE — 90833 PSYTX W PT W E/M 30 MIN: CPT | Mod: AF,HB,, | Performed by: PSYCHIATRY & NEUROLOGY

## 2024-12-02 RX ORDER — SERTRALINE HYDROCHLORIDE 100 MG/1
100 TABLET, FILM COATED ORAL DAILY
Qty: 30 TABLET | Refills: 1 | Status: SHIPPED | OUTPATIENT
Start: 2024-12-02 | End: 2025-12-02

## 2024-12-02 RX ORDER — HYDROXYZINE PAMOATE 50 MG/1
50 CAPSULE ORAL EVERY 6 HOURS PRN
Qty: 60 CAPSULE | Refills: 1 | Status: SHIPPED | OUTPATIENT
Start: 2024-12-02

## 2024-12-02 RX ADMIN — SERTRALINE HYDROCHLORIDE 100 MG: 100 TABLET ORAL at 08:12

## 2024-12-02 NOTE — PLAN OF CARE
Isolates in room, blunted, appropriate eye contact, calm/cooperative, verbal contract for safety, plan of care ongoing, accepts snacks without difficulty. Medications reviewed. Encouraged to participate in groups. Monitored Q15 minutes for falls and safety precautions per staff. Will continue to monitor for needs and safety.

## 2024-12-02 NOTE — NURSING
"Discharge instructions on medication, self care, suicide crisis hotline, and scheduled follow up appointment with the local mental health clinic. Patient voiced understanding of all discharge teachings. Patient denies SI/HI. Patient denies distress and reports " I am feeling so much better". Instructed this patient to continue to take a prenatal vitamin and make an appointment with her OB/GYN to continue her prenatal care.  Patient is awaiting for transportation per discharge orders.   "

## 2024-12-02 NOTE — PROGRESS NOTES
Psychotherapy:  Target symptoms: depression, anxiety   Why chosen therapy is appropriate versus another modality: relevant to diagnosis  Outcome monitoring methods: self-report  Therapeutic intervention type: supportive psychotherapy  Topics discussed/themes: building skills sets for symptom management, symptom recognition  Safety planning and wrap up session  The patient's response to the intervention is accepting. The patient's progress toward treatment goals is fair.   Duration of intervention: 16 minutes    Hoang Brown MD  Psychiatry
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
## 2024-12-02 NOTE — PSYCH
William Horton MRN:[id number] ([facility]#[id number]) (29 y.o. F) (Adm: 24)  [facility] [id number]  Patient Demographics    Patient Name  William Horton Legal Sex  Female          Age  1995 (29 y.o.) SSN  [id number] Address  [address] [address] Phone  [phone] (Home)  [phone] (Mobile) *Preferred*     Patient Demographics    Address  [address] [address] Phone  [phone] (Home)  [phone] (Mobile) *Preferred* E-mail Address  [email or web address]     PCP and Center    Primary Care Provider  Primary Doctor No Center  [facility]     Emergency Contacts    None on File     Other Contacts    Name Relation Home Work Mobile   Jennifer Horton Mother [phone]     Akira Horton Father   [phone]     Documents on File     Status Date Received Description   Documents for the Patient   Insurance Documents Received 13    Insurance Documents Received 13    Notice of Privacy Pract Ackn Signed 14 HIPAA   Patient ID Received 11/29/15 [place] ID    Advance Directive Acknowledgement Received 24    Clinic Authorization Received () 14    Provider Based Acknowledgement      Insurance Documents Received 14 [facility] COMM PLAN   Consents Received () 14 BALANCED BILLING NOTICE   Correspondence Received 14    Advance Directives and Living Will Received 14 don horton   Insurance Documents Received 11/29/15 [employer] card   Clinic Authorization Received () 04/02/15 consent/self   Notice of Privacy Pract Ackn Received 09/04/15 HIPAA   Consents   William Augustin [email or web address] [id number]   Consents   william Horton mrn [id number]   Patient ID Received 11/29/15    Patient ID Received 11/29/15    Plain Language Summary English No, Patient Declined Print () 16    Notice of Privacy Pract Ackn Signed 16 HIPAA SELF   Plain Language Summary English No,  Patient Declined Print () 16    Insurance Documents Received 17 BC   Clinic Authorization Signed () 17 CONSENT SELF   Patient ID Received 17 [place] ID 2  SELF   Plain Language Summary English No, Patient Declined Print () 17 PL SELF   Plain Language Summary English No, Patient Declined Print () 17 PL SELF   Consents   Vaginal Delivery Consent   Plain Language Summary English No, Patient Declined Print () 17    Consents   colpo   Plain Language Summary English No, Patient Declined Print () 17 PL SELF   Plain Language Summary English No, Patient Declined Print () 17    Plain Language Summary English No, Patient Declined Print () 17    Plain Language Summary English No, Patient Declined Print () 17    Plain Language Summary English No, Patient Declined Print () 17    Plain Language Summary English No, Patient Declined Print () 17    Plain Language Summary English No, Patient Declined Print () 10/03/17    [facility] Provider Based Facility Disclosure Signed () 10/30/17    Plain Language Summary English No, Patient Declined Print () 10/30/17    Plain Language Summary English No, Patient Declined Print () 17    [facility] Provider Based Facility Disclosure Signed () 17    [facility] Provider Based Facility Disclosure Signed () 17    Plain Language Summary English No, Patient Declined Print () 17    Insurance Documents Received 17    [facility] Provider Based Facility Disclosure Signed () 17 consent/self   Plain Language Summary English No, Patient Declined Print () 17    [facility] Provider Based Facility Disclosure Contracted () 18    [facility] Provider Based Facility Disclosure Signed () 18    Plain Language Summary English No, Patient Declined Print () 18     [facility] Provider Based Facility Disclosure Signed () 18    Plain Language Summary English No, Patient Declined Print () 18    [facility] Provider Based Facility Disclosure Contracted () 18    [facility] Provider Based Facility Disclosure Contracted () 18    [facility] Provider Based Facility Disclosure Signed () 18    Plain Language Summary English No, Patient Declined Print () 18    Clinic Authorization Signed () 18 CONSENT/SELF   [facility] Provider Based Facility Disclosure Contracted () 08/15/18    [facility] Provider Based Facility Disclosure Signed () 08/15/18    Clinic Authorization Signed () 19    [facility] Contracted Facility Disclosure Signed () 19    Plain Language Summary English Acknowledged () 19 Plain Language Summary   [facility] Contracted Facility Disclosure Signed 20    Plain Language Summary English Acknowledged () 20 Plain Language Summary   Plain Language Summary English Acknowledged () 20    Plain Language Summary English Acknowledged () 20    Consents Received () 20 Vaginal Delivery Consent   Plain Language Summary English Acknowledged () 10/30/20    Plain Language Summary English Acknowledged () 10/30/20    Plain Language Summary English Acknowledged () 20    Insurance Documents      Insurance Documents Received 20 [employer] insurance letter    Plain Language Summary English Acknowledged () 20    Insurance Documents Received 21    Patient ID Received 21 LADL [id number]   Plain Language Summary English Acknowledged () 01/15/21    Plain Language Summary English Acknowledged () 21    Clinic Authorization Received () 21    Plain Language Summary English Acknowledged () 21    Plain Language Summary English Acknowledged () 21     Outside Emergency Room Note Received 21    Living Will Received 14    Plain Language Summary English Acknowledged () 21    Notice of Privacy Pracrick Marie Signed 21 HIPAA    Plain Language Summary English Acknowledged () 21    Plain Language Summary English      Plain Language Summary English Acknowledged () 22    Plain Language Summary English Acknowledged () 01/10/22    Plain Language Summary English Acknowledged () 22    Clinic Authorization Signed 24    Clinic Authorization Signed () 22    Plain Language Summary English Acknowledged () 22    Plain Language Summary English Acknowledged () 03/15/22    Consents Received () 22 Vaginal delivery consent   Plain Language Summary English Acknowledged () 22    [facility] Not Contracted Facility Disclosure      Plain Language Summary English Acknowledged () 05/10/22    Plain Language Summary English Acknowledged () 22    Plain Language Summary English Acknowledged () 22    Plain Language Summary English Acknowledged () 22    Plain Language Summary English Acknowledged () 22    Plain Language Summary English Acknowledged () 22    Plain Language Summary English Acknowledged () 22    Plain Language Summary English Acknowledged () 22    Plain Language Summary English Acknowledged () 10/06/22    Plain Language Summary English Acknowledged () 10/20/22    Patient ID Received 10/24/22    Insurance Documents Received 10/24/22    Plain Language Summary English Acknowledged () 22    Plain Language Summary English Acknowledged () 24 POLY   Plain Language Summary English Acknowledged () 24 POLY   Patient Rights and Responsibilities      Notice of Nondiscrimination and Accessibility Signed 24     Plain Language Summary English Acknowledged () 24    Insurance Documents Received 24 MCAID [id number]   Notice of Privacy Pract Acrosita Signed 24    Patient Rights and Responsibilities Acknowledged 24    Plain Language Summary English Acknowledged () 24    Clinic Authorization Unable to Obtain (Deleted) 20    Clinic Authorization Unable to Obtain (Deleted) 10/02/20    Clinic Authorization Unable to Obtain (Deleted) 10/30/20    Clinic Authorization Unable to Obtain (Deleted) 20    Clinic Authorization Unable to Obtain (Deleted) 20    Clinic Authorization Unable to Obtain (Deleted) 01/15/21    HIM Release of Information Output  (Deleted) 21    Notice of Privacy Pract Frank  (Deleted)     Plain Language Summary English  (Deleted)     Patient ID Received (Deleted) 22    Patient Photo   Photo of Patient   Patient Photo   Photo of Patient   CE Prospective Auth Received () 23 Authorization Document from Social Security Administration   System-Retrieved CE Auth Form Received () 23 External Authorization Form from Social Security Administration   CE Prospective Auth Received () 23 Authorization Document from Social Security Administration   System-Retrieved CE Auth Form Received () 23 External Authorization Form from Social Security Administration   External Cardiology Imaging   EKG   External Cardiology Imaging   Hospital Encounter   External Cardiology Imaging   EKG   External Cardiology Imaging   ekg   [facility] Contracted Facility Disclosure  (Deleted)     Documents for the Encounter   Medicare Lifetime Reserve Form      Important Medicare Message Printed at Discharge      Advance Beneficiary Notice      Hospital Authorization      Hospital Authorization Scanned Signed 24    Flowsheets Nursing Received 24    Clinical References Attachment   Medications and Pregnancy (English)   Clinical  References Attachment   Depression Discharge Instructions, Adult (English)   Clinical References Attachment   Psychotherapy (English)   Clinical References Attachment   Suicide Prevention (English)   Clinical References Attachment   Tips for How to Help Your Mood (English)   Clinical References Attachment   Sertraline, ADULT (English)   Clinical References Attachment   Hydroxyzine, ADULT (English)   Clinical References Attachment   Prenatal Care (English)   Clinical References Attachment   Taking Over-the-Counter Medicines During Pregnancy (English)   Clinical References Attachment   Tips on Positive Thinking (English)     Admission Information    Current Information    Attending Provider Admitting Provider Admission Type Admission Status   Gab Plascencia III, MD Orgeron, Aubrey III, MD Urgent Confirmed Admission          Admission Date/Time Discharge Date Hospital Service Auth/Cert Status   11/26/24  0358  Psychiatry Incomplete          Hospital Area Unit Room/Bed    [facility] UN[facility] 210/210 D            Discharge Disposition Discharge Destination   Home or Self Care      Admission    Complaint   Homicidal ideations     Hospital Account    Name Acct ID Class Status Primary Coverage   Gayla [place] [id number] IP- Psych Open MEDICAID - [place]          Guarantor Account (for Hospital Account #[id number])    Name Relation to Pt Service Area Active? Acct Type   Gayla Montana Self [facility]SS[facility] Yes Behavioral Health   Address Phone     [address]  [address]  [place] [address] [phone](H)            Coverage Information (for Hospital Account #[id number])    F/O Payor/Plan Precert #   MEDICAID/[place]UE [place] [id number]   Subscriber Subscriber #   Gayla Montana [id number]   Address Phone   [address] [address]  [place] [address] [phone]

## 2024-12-02 NOTE — PROGRESS NOTES
"   12/02/24 1030   [facility] Group Therapy   Group Name Therapeutic Recreation   Specific Interventions Coping Skills Training   Participation Level Minimal   Participation Quality Lack of Interest   Insight/Motivation Good   Affect/Mood Display Appropriate   Cognition Alert   Psychomotor WNL     Patient was reluctant to attend, was focused on discharge, reports a "fine" mood. Patient was quiet unless approached. Patient identified taking up a hobby and/or learning a new skill and have some fun and/or be creative an positive steps to wellbeing.  "

## 2024-12-02 NOTE — PLAN OF CARE
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 psychiatric hospitalization     [facility] 11/3/[facility] to 11/21/2024; Current: [facility] 11/26/2024    Hx of psychiatric care     Zoloft 25mg (remote history); Buspar 10mg (10/17/2024); Zoloft 100mg (11/22/2024)    Inflammatory bowel disease     Postpartum depression     Psychiatric problem     Depression; Anxiety; Suicidal ideations; Homicidal ideations; anhedonia; sleep disturbance; "nightmares".       Substance Abuse History:     Alcohol - (Patient Perspective):   Social History     Substance and Sexual Activity   Alcohol Use Not Currently    Alcohol/week: 1.0 standard drink of alcohol    Types: 1 Glasses of wine per week    Comment: socially       Drugs - (Patient Perspective):   Social History     Substance and Sexual Activity   Drug Use Yes    Types: Marijuana    Comment: Daily         Education:     Currently Enrolled? No  High School (9-12) or GED    Special Education? No    Interested in Completing Education/GED: No    Employment and Financial:     Currently employed? unemployed    Source of Income:  Support from family.    Able to afford basic needs (food, shelter, utilities)? TBD    Who manages finances/personal " affairs? Self.      Service:     [place]? no    Combat Service? No     Community Resources:     Describe present use of community resources: none.     Identify previously used community resources   (Include previous mental health treatment - outpatient and inpatient): Urgent care, local emergency departments of local hospital.    Environmental:     Current living situation: Pt living with her two oldest children with her mother.  Social Evaluation:     Patient Assets: General fund of knowledge, Supportive family/friends, Capable of independent living, Work skills, Ability for insight, and Communicable skills    Patient Limitations: none noted.    High risk psychosocial issues that may impact discharge planning:   None noted.    Recommendations:     Anticipated discharge plan:   Home with her mother and two children and outpatient mental health.    High risk issues requiring early treatment planning and immediate intervention: none.    Community resources needed for discharge planning:  aftercare treatment sources and transportation  home.    Anticipated social work role(s) in treatment and discharge planning: Clinical team will coordinate discharge with patient preference for outpatient provider and coordinate transportation home if needed.

## 2024-12-02 NOTE — PSYCH
[facility] faxed aftercare referral packet to [facility] for follow-up appointment. [facility] will check status in 15 minutes.

## 2024-12-02 NOTE — PSYCH
Pt will follow-up with [facility]. Intake cordinator will call pt at  [phone] with appointment date and time. Medicaid will provide transportation; trip # [id number], and will arrive by 4:15 pm. Follow-up note entered.

## 2024-12-02 NOTE — NURSING
Pt slept approximately 7.5 hours. Resp even and unlabored. Safety checks ongoing and precautions maintained.

## 2024-12-02 NOTE — PSYCH
Pt discharge to home with follow-up at [facility].  will call pt with appointment date and time. Pt will receive medication management, counseling for behavioral health and smoking cessation. Pt given prescriptions for psychotropic medications and Nicoderm CQ patches 14mg/24hr. AVS faxed 12/2/24 @ 2:15 pm.

## 2024-12-02 NOTE — PSYCH
Latest Reference Range & Units 11/25/24 16:09   WBC 3.90 - 12.70 K/uL 9.46   RBC 4.00 - 5.40 M/uL 3.89 (L)   Hemoglobin 12.0 - 16.0 g/dL 12.0   Hematocrit 37.0 - 48.5 % 35.1 (L)   MCV 82 - 98 fL 90   MCH 27.0 - 31.0 pg 30.8   MCHC 32.0 - 36.0 g/dL 34.2   RDW 11.5 - 14.5 % 13.0   Platelet Count 150 - 450 K/uL 174   MPV 9.2 - 12.9 fL 10.9   Gran % 38.0 - 73.0 % 79.1 (H)   Lymph % 18.0 - 48.0 % 16.0 (L)   Mono % 4.0 - 15.0 % 4.3   Eos % 0.0 - 8.0 % 0.1   Basophil % 0.0 - 1.9 % 0.2   Immature Granulocytes 0.0 - 0.5 % 0.3   Gran # (ANC) 1.8 - 7.7 K/uL 7.5   Lymph # 1.0 - 4.8 K/uL 1.5   Mono # 0.3 - 1.0 K/uL 0.4   Eos # 0.0 - 0.5 K/uL 0.0   Baso # 0.00 - 0.20 K/uL 0.02   Immature Grans (Abs) 0.00 - 0.04 K/uL 0.03   nRBC 0 /100 WBC 0   Differential Method  Automated   (L): Data is abnormally low  (H): Data is abnormally high     Latest Reference Range & Units 11/25/24 14:25   Benzodiazepines Negative  Negative   Methadone metabolites Negative  Negative   Phencyclidine Negative  Negative   Toxicology Information  SEE COMMENT   Cocaine, Urine Negative  Negative   Opiates, Urine Negative  Negative   Barbituates, Urine Negative  Negative   Amphetamines, Urine Negative  Negative   Marijuana (THC) Metabolite Negative  Presumptive Positive !   !: Data is abnormal   Latest Reference Range & Units 11/25/24 16:09   Alcohol, Serum <10 mg/dL <10   Acetaminophen Level 10.0 - 20.0 ug/mL <3.0 (L)   (L): Data is abnormally low     Latest Reference Range & Units 11/25/24 14:25   Specimen UA  Urine, Clean Catch   Color, UA Yellow, Straw, Dina  Yellow   Appearance, UA Clear  Clear   Spec Grav UA 1.005 - 1.030  1.020   pH, UA 5.0 - 8.0  7.0   Protein, UA Negative  Negative   Glucose, UA Negative  Negative   Ketones, UA Negative  1+ !   Blood, UA Negative  Negative   NITRITE UA Negative  Negative   UROBILINOGEN UA <2.0 EU/dL Negative   Bilirubin (UA) Negative  Negative   Leukocyte Esterase, UA Negative  2+ !   RBC, UA 0 - 4 /hpf 2   WBC, UA  0 - 5 /hpf 4   Squam Epithel, UA /hpf 6   Microscopic Comment  SEE COMMENT   Urine Creatinine 15.0 - 325.0 mg/dL 63.7   !: Data is abnormal

## 2024-12-02 NOTE — NURSING
"Talking with Pt. Reference was made by this RN about a psych facility being similar to longterm. Pt was reading and said "I could do this all day. Get your aggression out and be out in a few."  "

## 2024-12-10 DIAGNOSIS — Z36.89 ENCOUNTER FOR FETAL ANATOMIC SURVEY: Primary | ICD-10-CM

## 2025-01-06 ENCOUNTER — PROCEDURE VISIT (OUTPATIENT)
Dept: OBSTETRICS AND GYNECOLOGY | Facility: CLINIC | Age: 30
End: 2025-01-06
Payer: MEDICAID

## 2025-01-06 ENCOUNTER — OFFICE VISIT (OUTPATIENT)
Dept: OBSTETRICS AND GYNECOLOGY | Facility: CLINIC | Age: 30
End: 2025-01-06
Payer: MEDICAID

## 2025-01-06 ENCOUNTER — LAB VISIT (OUTPATIENT)
Dept: LAB | Facility: HOSPITAL | Age: 30
End: 2025-01-06
Attending: ADVANCED PRACTICE MIDWIFE
Payer: MEDICAID

## 2025-01-06 VITALS
SYSTOLIC BLOOD PRESSURE: 115 MMHG | HEIGHT: 61 IN | DIASTOLIC BLOOD PRESSURE: 70 MMHG | WEIGHT: 137.38 LBS | BODY MASS INDEX: 25.94 KG/M2

## 2025-01-06 DIAGNOSIS — O21.9 NAUSEA AND VOMITING DURING PREGNANCY: ICD-10-CM

## 2025-01-06 DIAGNOSIS — F32.A DEPRESSION DURING PREGNANCY IN SECOND TRIMESTER: ICD-10-CM

## 2025-01-06 DIAGNOSIS — Z36.89 ENCOUNTER FOR FETAL ANATOMIC SURVEY: ICD-10-CM

## 2025-01-06 DIAGNOSIS — Z36.2 ENCOUNTER FOR FOLLOW-UP ULTRASOUND OF FETAL ANATOMY: ICD-10-CM

## 2025-01-06 DIAGNOSIS — Z34.82 ENCOUNTER FOR SUPERVISION OF OTHER NORMAL PREGNANCY IN SECOND TRIMESTER: Primary | ICD-10-CM

## 2025-01-06 DIAGNOSIS — Z34.82 ENCOUNTER FOR SUPERVISION OF OTHER NORMAL PREGNANCY IN SECOND TRIMESTER: ICD-10-CM

## 2025-01-06 DIAGNOSIS — O99.342 DEPRESSION DURING PREGNANCY IN SECOND TRIMESTER: ICD-10-CM

## 2025-01-06 PROBLEM — Z3A.12 12 WEEKS GESTATION OF PREGNANCY: Status: RESOLVED | Noted: 2024-11-13 | Resolved: 2025-01-06

## 2025-01-06 PROBLEM — Z34.92 ENCOUNTER FOR SUPERVISION OF NORMAL PREGNANCY IN SECOND TRIMESTER: Status: ACTIVE | Noted: 2025-01-06

## 2025-01-06 PROBLEM — Z3A.14 14 WEEKS GESTATION OF PREGNANCY: Status: RESOLVED | Noted: 2024-11-26 | Resolved: 2025-01-06

## 2025-01-06 LAB
ABO + RH BLD: NORMAL
BLD GP AB SCN CELLS X3 SERPL QL: NORMAL
ERYTHROCYTE [DISTWIDTH] IN BLOOD BY AUTOMATED COUNT: 13.5 % (ref 11.5–14.5)
HAV IGM SERPL QL IA: NORMAL
HBV CORE IGM SERPL QL IA: NORMAL
HBV SURFACE AG SERPL QL IA: NORMAL
HCT VFR BLD AUTO: 34.8 % (ref 37–48.5)
HCV AB SERPL QL IA: NORMAL
HGB BLD-MCNC: 11.2 G/DL (ref 12–16)
HIV 1+2 AB+HIV1 P24 AG SERPL QL IA: NORMAL
MCH RBC QN AUTO: 30.6 PG (ref 27–31)
MCHC RBC AUTO-ENTMCNC: 32.2 G/DL (ref 32–36)
MCV RBC AUTO: 95 FL (ref 82–98)
PLATELET # BLD AUTO: 199 K/UL (ref 150–450)
PMV BLD AUTO: 11.9 FL (ref 9.2–12.9)
RBC # BLD AUTO: 3.66 M/UL (ref 4–5.4)
SPECIMEN OUTDATE: NORMAL
TREPONEMA PALLIDUM IGG+IGM AB [PRESENCE] IN SERUM OR PLASMA BY IMMUNOASSAY: NONREACTIVE
WBC # BLD AUTO: 9.24 K/UL (ref 3.9–12.7)

## 2025-01-06 PROCEDURE — 99999 PR PBB SHADOW E&M-EST. PATIENT-LVL III: CPT | Mod: PBBFAC,,, | Performed by: ADVANCED PRACTICE MIDWIFE

## 2025-01-06 PROCEDURE — 86900 BLOOD TYPING SEROLOGIC ABO: CPT | Performed by: ADVANCED PRACTICE MIDWIFE

## 2025-01-06 PROCEDURE — 80074 ACUTE HEPATITIS PANEL: CPT | Performed by: ADVANCED PRACTICE MIDWIFE

## 2025-01-06 PROCEDURE — 87389 HIV-1 AG W/HIV-1&-2 AB AG IA: CPT | Performed by: ADVANCED PRACTICE MIDWIFE

## 2025-01-06 PROCEDURE — 99213 OFFICE O/P EST LOW 20 MIN: CPT | Mod: PBBFAC,TH | Performed by: ADVANCED PRACTICE MIDWIFE

## 2025-01-06 PROCEDURE — 85027 COMPLETE CBC AUTOMATED: CPT | Performed by: ADVANCED PRACTICE MIDWIFE

## 2025-01-06 PROCEDURE — 76805 OB US >/= 14 WKS SNGL FETUS: CPT | Mod: PBBFAC | Performed by: OBSTETRICS & GYNECOLOGY

## 2025-01-06 PROCEDURE — 87491 CHLMYD TRACH DNA AMP PROBE: CPT | Performed by: ADVANCED PRACTICE MIDWIFE

## 2025-01-06 PROCEDURE — 86593 SYPHILIS TEST NON-TREP QUANT: CPT | Performed by: ADVANCED PRACTICE MIDWIFE

## 2025-01-06 PROCEDURE — 76817 TRANSVAGINAL US OBSTETRIC: CPT | Mod: 26,S$PBB,, | Performed by: OBSTETRICS & GYNECOLOGY

## 2025-01-06 PROCEDURE — 86762 RUBELLA ANTIBODY: CPT | Performed by: ADVANCED PRACTICE MIDWIFE

## 2025-01-06 RX ORDER — ONDANSETRON 8 MG/1
8 TABLET, ORALLY DISINTEGRATING ORAL 2 TIMES DAILY
Qty: 30 TABLET | Refills: 3 | Status: SHIPPED | OUTPATIENT
Start: 2025-01-06

## 2025-01-06 NOTE — PROGRESS NOTES
"CHIEF COMPLAINT:   Patient presents with      Possible Pregnancy        HISTORY OF PRESENT ILLNESS  Gayla Montana 29 y.o.  presents for pregnancy risk assessment.   The patient has no complaints today.  No nausea or vomiting. No bleeding or pain.  Pregnancy was not  planned but is desired.  Partner is supportive of pregnancy.  Lives at home with mother.  No pets at home.  Denies domestic abuse.  Denies chemical/pesticide/radiation exposure.  OB history:   x 4 term pregnancies       LMP: Patient's last menstrual period was 2024 (exact date).  EDC: Estimated Date of Delivery: 25  EGA: 20w4d       Health Maintenance   Topic Date Due    Influenza Vaccine (1) Never done    COVID-19 Vaccine ( season) Never done    TETANUS VACCINE  10/15/2025    Pap Smear  2025    Hepatitis C Screening  Completed    HIV Screening  Completed    RSV Vaccine (Age 60+ and Pregnant patients) (No Doses Required) Completed    Lipid Panel  Completed    Pneumococcal Vaccines (Age 0-49)  Aged Out       Past Medical History:   Diagnosis Date    Abnormal Pap smear of cervix     MITZI 2    Anemia     Anxiety     Depression     Depression 2021    GBS (group B Streptococcus carrier), +RV culture, currently pregnant 10/11/2022    RX in labor    Headache(784.0)     Hemorrhoids     History of psychiatric hospitalization     [facility] 11/3/2024 to 2024; Current: [facility] 2024    Hx of psychiatric care     Zoloft 25mg (remote history); Buspar 10mg (10/17/2024); Zoloft 100mg (2024)    Inflammatory bowel disease     Postpartum depression     Psychiatric problem     Depression; Anxiety; Suicidal ideations; Homicidal ideations; anhedonia; sleep disturbance; "nightmares".       Past Surgical History:   Procedure Laterality Date    ADENOIDECTOMY      APPENDECTOMY      CERVICAL BIOPSY  W/ LOOP ELECTRODE EXCISION      FOOT SURGERY      TONSILLECTOMY         Family History   Problem " "Relation Name Age of Onset    Diabetes Mother Jennifer Montana     Ovarian cancer Mother Jennifer [place]     Diabetes Father      No Known Problems Sister      No Known Problems Brother      Multiple sclerosis Maternal Aunt      Ovarian cancer Maternal Aunt      Breast cancer Neg Hx      Colon cancer Neg Hx      Thrombosis Neg Hx         Social History     Socioeconomic History    Marital status:     Number of children: 4   Tobacco Use    Smoking status: Never    Smokeless tobacco: Never   Substance and Sexual Activity    Alcohol use: Not Currently     Alcohol/week: 1.0 standard drink of alcohol     Types: 1 Glasses of wine per week     Comment: socially    Drug use: Yes     Types: Marijuana     Comment: Daily    Sexual activity: Not Currently     Partners: Male   Other Topics Concern    Patient feels they ought to cut down on drinking/drug use No    Patient annoyed by others criticizing their drinking/drug use No    Patient has felt bad or guilty about drinking/drug use No    Patient has had a drink/used drugs as an eye opener in the AM No   Social History Narrative    Pt was  and  once and has two children from the marriage who are now ages 7 and 8 and reside with the patient at her parents. Pt has two other children with her ex-boyfriend, Jose Roberto Schwab. Pt states she has "joint custody" but only asks for the children every other weekend. Pt believes her ex-boyfriend's girlfriend and the girl's mother are interfering with her ability to have the children spend the night with her on weekends.      Social Drivers of Health     Financial Resource Strain: Medium Risk (12/21/2021)    Overall Financial Resource Strain (CARDIA)     Difficulty of Paying Living Expenses: Somewhat hard   Food Insecurity: Food Insecurity Present (12/21/2021)    Hunger Vital Sign     Worried About Running Out of Food in the Last Year: Sometimes true     Ran Out of Food in the Last Year: Sometimes true "   Transportation Needs: No Transportation Needs (12/21/2021)    PRAPARE - Transportation     Lack of Transportation (Medical): No     Lack of Transportation (Non-Medical): No   Physical Activity: Insufficiently Active (12/21/2021)    Exercise Vital Sign     Days of Exercise per Week: 2 days     Minutes of Exercise per Session: 20 min   Stress: Stress Concern Present (12/21/2021)    [employer] - Occupational Stress Questionnaire     Feeling of Stress : Very much   Housing Stability: High Risk (12/21/2021)    Housing Stability Vital Sign     Unable to Pay for Housing in the Last Year: Yes     Unstable Housing in the Last Year: No       Current Outpatient Medications   Medication Sig Dispense Refill    hydrOXYzine pamoate (VISTARIL) 50 MG Cap Take 1 capsule (50 mg total) by mouth every 6 (six) hours as needed (anxiety and/or insomnia). 60 capsule 1    sertraline (ZOLOFT) 100 MG tablet Take 1 tablet (100 mg total) by mouth once daily. 30 tablet 1    ondansetron (ZOFRAN-ODT) 8 MG TbDL Take 1 tablet (8 mg total) by mouth 2 (two) times daily. 30 tablet 3     No current facility-administered medications for this visit.       Review of patient's allergies indicates:   Allergen Reactions    Latex Rash    Latex, natural rubber Swelling and Rash    Tomato Swelling         PHYSICAL EXAM   Vitals:    01/06/25 1034   BP: 115/70        PAIN SCALE: 0/10 None    PHYSICAL EXAM    ROS:  GENERAL: No fever, chills, fatigability or weight loss.  CV: Denies chest pain  PULM: Denies shortness of breath or wheezing.  ABDOMEN: Appetite fine. No weight loss. Denies diarrhea, abdominal pain, hematemesis or blood in stool.  URINARY: No flank pain, dysuria or hematuria.  REPRODUCTIVE: No abnormal vaginal bleeding.       PE:   APPEARANCE: Well nourished, well developed, in no acute distress  CHEST: Clear to auscultation bilaterally  CV: Regular rate and rhythm  BREASTS: Symmetrical, no skin changes or visible lesions.  No palpable masses, nipple discharge or adenopathy bilaterally.  ABDOMEN: Soft. No tenderness or masses. No hepatosplenomegaly. No hernias      Reviewed anatomy US-normal fetal anatomy with no obvious abnormalities. Suboptimal views of RVOT S=D. Normal amniotic fluid, normal placental location in posterior position, no evidence of previa. Normal appearing cervical length at 37.1. Male gender. 3VC. Will await [facility] recommendations.      A/P:     -      Patient was counseled today on A.C.S. Pap guidelines and recommendations for yearly pelvic exams, mammograms and monthly self breast exams; to see her PCP for other health maintenance and pregnancy.   -      Patient's medications and medical history reviewed with patient and implications in pregnancy.   -      Pregnancy course discussed and 'AtoZ' book given. Patient was counseled on proper weight gain based on the [place] [facility]'s recommendations based on her pre-pregnancy weight. Discussed foods to avoid in pregnancy (i.e. sushi, fish that are high in mercury, deli meat, and unpasteurized cheeses). Discussed prenatal vitamin options (i.e. stool softener, DHA). Discussed potential medical problems in pregnancy.  -     Discussed risk of Toxoplasmosis transmission from pets and reviewed risk reduction techniques.  -     Pt was counseled on exercise in pregnancy and weight gain recommendations.  -     Oriented to practice including CNM collaboration.   -     Follow-up initial OB, with labs and u/s.               1. Encounter for supervision of other normal pregnancy in second trimester  -     C. trachomatis/N. gonorrhoeae by AMP DNA [facility]; Urine  -     CBC Without Differential; Future; Expected date: 01/06/2025  -     Hepatitis Panel, Acute; Future; Expected date: 01/06/2025  -     HIV 1/2 Ag/Ab (4th Gen); Future; Expected date: 01/06/2025  -     Rubella Antibody, IgG; Future; Expected date: 01/06/2025  -     Type & Screen; Future; Expected date: 01/06/2025  -      Treponema Pallidium Antibodies IgG, IgM; Future; Expected date: 01/06/2025    2. Encounter for follow-up ultrasound of fetal anatomy  -     US OB/GYN Procedure ([facility]); Future    3. Nausea and vomiting during pregnancy  -     ondansetron (ZOFRAN-ODT) 8 MG TbDL; Take 1 tablet (8 mg total) by mouth 2 (two) times daily.  Dispense: 30 tablet; Refill: 3    4. Depression during pregnancy in second trimester  Overview:  Has extensive psych history. Stable on 100mg zoloft, consider increasing dose around 35 weeks of pregnancy (normally delivers around 37 weeks), as she has a history of PP psychosis.

## 2025-01-07 LAB
C TRACH DNA SPEC QL NAA+PROBE: NOT DETECTED
N GONORRHOEA DNA SPEC QL NAA+PROBE: NOT DETECTED
RUBV IGG SER-ACNC: 34.6 IU/ML
RUBV IGG SER-IMP: REACTIVE